# Patient Record
Sex: FEMALE | Race: WHITE | Employment: OTHER | URBAN - METROPOLITAN AREA
[De-identification: names, ages, dates, MRNs, and addresses within clinical notes are randomized per-mention and may not be internally consistent; named-entity substitution may affect disease eponyms.]

---

## 2017-11-28 ENCOUNTER — HOSPITAL ENCOUNTER (OUTPATIENT)
Dept: MAMMOGRAPHY | Age: 77
Discharge: HOME | End: 2017-11-28
Attending: FAMILY MEDICINE | Admitting: FAMILY MEDICINE

## 2017-11-28 DIAGNOSIS — Z12.31 ENCOUNTER FOR SCREENING MAMMOGRAM FOR BREAST CANCER: ICD-10-CM

## 2018-12-03 ENCOUNTER — HOSPITAL ENCOUNTER (OUTPATIENT)
Dept: GENERAL RADIOLOGY | Age: 78
Discharge: HOME | End: 2018-12-03
Payer: MEDICARE

## 2018-12-03 ENCOUNTER — HOSPITAL ENCOUNTER (OUTPATIENT)
Age: 78
Discharge: HOME | End: 2018-12-03
Payer: MEDICARE

## 2018-12-03 DIAGNOSIS — R05.9 COUGH: ICD-10-CM

## 2018-12-03 PROCEDURE — 71046 X-RAY EXAM CHEST 2 VIEWS: CPT

## 2019-01-07 ENCOUNTER — HOSPITAL ENCOUNTER (OUTPATIENT)
Dept: CT IMAGING | Age: 79
Discharge: HOME | End: 2019-01-07
Payer: MEDICARE

## 2019-01-07 DIAGNOSIS — R05.9 COUGH: ICD-10-CM

## 2019-01-07 PROCEDURE — 71260 CT THORAX DX C+: CPT

## 2019-01-07 PROCEDURE — 6360000004 HC RX CONTRAST MEDICATION: Performed by: FAMILY MEDICINE

## 2019-01-07 RX ADMIN — IOPAMIDOL 75 ML: 755 INJECTION, SOLUTION INTRAVENOUS at 13:07

## 2019-01-09 ENCOUNTER — TELEPHONE (OUTPATIENT)
Dept: PULMONOLOGY | Age: 79
End: 2019-01-09

## 2019-01-25 ENCOUNTER — HOSPITAL ENCOUNTER (OUTPATIENT)
Dept: MAMMOGRAPHY | Age: 79
Discharge: HOME | End: 2019-01-25
Payer: MEDICARE

## 2019-01-25 DIAGNOSIS — Z12.31 SCREENING MAMMOGRAM, ENCOUNTER FOR: ICD-10-CM

## 2019-01-25 PROCEDURE — 77067 SCR MAMMO BI INCL CAD: CPT

## 2019-03-14 ENCOUNTER — HOSPITAL ENCOUNTER (OUTPATIENT)
Age: 79
Discharge: HOME | End: 2019-03-14
Payer: MEDICARE

## 2019-03-14 PROCEDURE — 83013 H PYLORI (C-13) BREATH: CPT

## 2019-03-15 LAB — H PYLORI BREATH TEST: NEGATIVE

## 2019-09-30 ENCOUNTER — HOSPITAL ENCOUNTER (OUTPATIENT)
Dept: GENERAL RADIOLOGY | Age: 79
Discharge: HOME | End: 2019-09-30
Payer: MEDICARE

## 2019-09-30 ENCOUNTER — HOSPITAL ENCOUNTER (OUTPATIENT)
Age: 79
Discharge: HOME | End: 2019-09-30
Payer: MEDICARE

## 2019-09-30 DIAGNOSIS — M25.551 RIGHT HIP PAIN: ICD-10-CM

## 2019-09-30 PROCEDURE — 73502 X-RAY EXAM HIP UNI 2-3 VIEWS: CPT

## 2019-11-05 ENCOUNTER — OFFICE VISIT (OUTPATIENT)
Dept: ORTHOPEDIC SURGERY | Age: 79
End: 2019-11-05
Payer: MEDICARE

## 2019-11-05 VITALS — WEIGHT: 154.98 LBS | HEIGHT: 65 IN | BODY MASS INDEX: 25.82 KG/M2

## 2019-11-05 DIAGNOSIS — M16.11 PRIMARY OSTEOARTHRITIS OF RIGHT HIP: Primary | ICD-10-CM

## 2019-11-05 DIAGNOSIS — M54.32 BILATERAL SCIATICA: ICD-10-CM

## 2019-11-05 DIAGNOSIS — M48.061 SPINAL STENOSIS OF LUMBAR REGION, UNSPECIFIED WHETHER NEUROGENIC CLAUDICATION PRESENT: ICD-10-CM

## 2019-11-05 DIAGNOSIS — M70.61 GREATER TROCHANTERIC BURSITIS OF RIGHT HIP: ICD-10-CM

## 2019-11-05 DIAGNOSIS — M54.31 BILATERAL SCIATICA: ICD-10-CM

## 2019-11-05 PROCEDURE — G8400 PT W/DXA NO RESULTS DOC: HCPCS | Performed by: ORTHOPAEDIC SURGERY

## 2019-11-05 PROCEDURE — 1036F TOBACCO NON-USER: CPT | Performed by: ORTHOPAEDIC SURGERY

## 2019-11-05 PROCEDURE — G8417 CALC BMI ABV UP PARAM F/U: HCPCS | Performed by: ORTHOPAEDIC SURGERY

## 2019-11-05 PROCEDURE — G8428 CUR MEDS NOT DOCUMENT: HCPCS | Performed by: ORTHOPAEDIC SURGERY

## 2019-11-05 PROCEDURE — 1090F PRES/ABSN URINE INCON ASSESS: CPT | Performed by: ORTHOPAEDIC SURGERY

## 2019-11-05 PROCEDURE — G8484 FLU IMMUNIZE NO ADMIN: HCPCS | Performed by: ORTHOPAEDIC SURGERY

## 2019-11-05 PROCEDURE — 99203 OFFICE O/P NEW LOW 30 MIN: CPT | Performed by: ORTHOPAEDIC SURGERY

## 2019-11-05 PROCEDURE — 1123F ACP DISCUSS/DSCN MKR DOCD: CPT | Performed by: ORTHOPAEDIC SURGERY

## 2019-11-05 PROCEDURE — 4040F PNEUMOC VAC/ADMIN/RCVD: CPT | Performed by: ORTHOPAEDIC SURGERY

## 2019-11-05 RX ORDER — PREDNISONE 1 MG/1
TABLET ORAL
Qty: 55 TABLET | Refills: 0 | Status: SHIPPED | OUTPATIENT
Start: 2019-11-05 | End: 2020-07-01

## 2019-11-05 RX ORDER — TRAMADOL HYDROCHLORIDE 50 MG/1
50 TABLET ORAL EVERY 6 HOURS PRN
Qty: 28 TABLET | Refills: 0 | Status: SHIPPED | OUTPATIENT
Start: 2019-11-05 | End: 2019-11-12

## 2020-01-07 ENCOUNTER — OFFICE VISIT (OUTPATIENT)
Dept: ORTHOPEDIC SURGERY | Age: 80
End: 2020-01-07
Payer: MEDICARE

## 2020-01-07 PROCEDURE — 20610 DRAIN/INJ JOINT/BURSA W/O US: CPT | Performed by: ORTHOPAEDIC SURGERY

## 2020-01-07 RX ORDER — BETAMETHASONE SODIUM PHOSPHATE AND BETAMETHASONE ACETATE 3; 3 MG/ML; MG/ML
12 INJECTION, SUSPENSION INTRA-ARTICULAR; INTRALESIONAL; INTRAMUSCULAR; SOFT TISSUE ONCE
Status: COMPLETED | OUTPATIENT
Start: 2020-01-07 | End: 2020-01-07

## 2020-01-07 RX ADMIN — BETAMETHASONE SODIUM PHOSPHATE AND BETAMETHASONE ACETATE 12 MG: 3; 3 INJECTION, SUSPENSION INTRA-ARTICULAR; INTRALESIONAL; INTRAMUSCULAR; SOFT TISSUE at 09:45

## 2020-01-07 NOTE — PROGRESS NOTES
She presents today requesting a cortisone injection to the right hip for diagnosis of greater trochanteric bursitis    After informed consent was received from the patient, the right hip was sterilely prepped using Betadine with the patient in a lateral decubitusposition then using ethyl chloride as a topical refrigerant andan injection using 1 mL of 6mg/ml Bethamethasone, 2 mL each of 1% Xylocaine and 0.5%Marcaine in a 5 mL syringe and a 25-gauge spinal needle was used to inject the medication into the greater trochanteric region and to the point of maximum  tenderness. The patient otherwise appeared to tolerate the injection well. Encounter Diagnosis   Name Primary?     Greater trochanteric bursitis of right hip Yes      Orders Placed This Encounter   Procedures    MD ARTHROCENTESIS ASPIR&/INJ MAJOR JT/BURSA W/O US

## 2020-01-27 ENCOUNTER — HOSPITAL ENCOUNTER (OUTPATIENT)
Dept: MAMMOGRAPHY | Age: 80
Discharge: HOME | End: 2020-01-27
Payer: MEDICARE

## 2020-01-27 ENCOUNTER — TELEPHONE (OUTPATIENT)
Dept: MAMMOGRAPHY | Age: 80
End: 2020-01-27

## 2020-01-27 PROCEDURE — 77067 SCR MAMMO BI INCL CAD: CPT

## 2020-07-01 ENCOUNTER — APPOINTMENT (OUTPATIENT)
Dept: CT IMAGING | Age: 80
End: 2020-07-01
Payer: MEDICARE

## 2020-07-01 ENCOUNTER — APPOINTMENT (OUTPATIENT)
Dept: GENERAL RADIOLOGY | Age: 80
End: 2020-07-01
Payer: MEDICARE

## 2020-07-01 ENCOUNTER — HOSPITAL ENCOUNTER (EMERGENCY)
Age: 80
Discharge: ANOTHER HOSPITAL | End: 2020-07-01
Attending: EMERGENCY MEDICINE
Payer: MEDICARE

## 2020-07-01 ENCOUNTER — HOSPITAL ENCOUNTER (OUTPATIENT)
Age: 80
Setting detail: OBSERVATION
Discharge: HOME | End: 2020-07-02
Attending: INTERNAL MEDICINE | Admitting: INTERNAL MEDICINE
Payer: MEDICARE

## 2020-07-01 VITALS
RESPIRATION RATE: 16 BRPM | DIASTOLIC BLOOD PRESSURE: 67 MMHG | SYSTOLIC BLOOD PRESSURE: 153 MMHG | WEIGHT: 146 LBS | TEMPERATURE: 98.6 F | HEART RATE: 43 BPM | BODY MASS INDEX: 24.32 KG/M2 | OXYGEN SATURATION: 97 % | HEIGHT: 65 IN

## 2020-07-01 PROBLEM — R07.9 CHEST PAIN: Status: ACTIVE | Noted: 2020-07-01

## 2020-07-01 LAB
A/G RATIO: 2 (ref 1.1–2.2)
ALBUMIN SERPL-MCNC: 4.4 G/DL (ref 3.4–5)
ALP BLD-CCNC: 66 U/L (ref 40–129)
ALT SERPL-CCNC: 10 U/L (ref 10–40)
ANION GAP SERPL CALCULATED.3IONS-SCNC: 8 MMOL/L (ref 3–16)
APTT: 39.9 SEC (ref 24.2–36.2)
AST SERPL-CCNC: 20 U/L (ref 15–37)
BASOPHILS ABSOLUTE: 0.1 K/UL (ref 0–0.2)
BASOPHILS RELATIVE PERCENT: 0.8 %
BILIRUB SERPL-MCNC: 0.9 MG/DL (ref 0–1)
BUN BLDV-MCNC: 22 MG/DL (ref 7–20)
CALCIUM SERPL-MCNC: 9.4 MG/DL (ref 8.3–10.6)
CHLORIDE BLD-SCNC: 106 MMOL/L (ref 99–110)
CO2: 28 MMOL/L (ref 21–32)
CREAT SERPL-MCNC: 0.7 MG/DL (ref 0.6–1.2)
EOSINOPHILS ABSOLUTE: 0.1 K/UL (ref 0–0.6)
EOSINOPHILS RELATIVE PERCENT: 2.4 %
GFR AFRICAN AMERICAN: >60
GFR NON-AFRICAN AMERICAN: >60
GLOBULIN: 2.2 G/DL
GLUCOSE BLD-MCNC: 98 MG/DL (ref 70–99)
HCT VFR BLD CALC: 43 % (ref 36–48)
HEMOGLOBIN: 14.1 G/DL (ref 12–16)
INR BLD: 1.84 (ref 0.86–1.14)
LIPASE: 18 U/L (ref 13–60)
LYMPHOCYTES ABSOLUTE: 1.5 K/UL (ref 1–5.1)
LYMPHOCYTES RELATIVE PERCENT: 23.9 %
MCH RBC QN AUTO: 29.3 PG (ref 26–34)
MCHC RBC AUTO-ENTMCNC: 32.8 G/DL (ref 31–36)
MCV RBC AUTO: 89.3 FL (ref 80–100)
MONOCYTES ABSOLUTE: 0.5 K/UL (ref 0–1.3)
MONOCYTES RELATIVE PERCENT: 8.5 %
NEUTROPHILS ABSOLUTE: 4 K/UL (ref 1.7–7.7)
NEUTROPHILS RELATIVE PERCENT: 64.4 %
PDW BLD-RTO: 14.6 % (ref 12.4–15.4)
PLATELET # BLD: 130 K/UL (ref 135–450)
PMV BLD AUTO: 9.5 FL (ref 5–10.5)
POTASSIUM SERPL-SCNC: 4 MMOL/L (ref 3.5–5.1)
PROTHROMBIN TIME: 21 SEC (ref 10–13.2)
RBC # BLD: 4.81 M/UL (ref 4–5.2)
SODIUM BLD-SCNC: 142 MMOL/L (ref 136–145)
TOTAL PROTEIN: 6.6 G/DL (ref 6.4–8.2)
TROPONIN: <0.01 NG/ML
WBC # BLD: 6.1 K/UL (ref 4–11)

## 2020-07-01 PROCEDURE — 6360000004 HC RX CONTRAST MEDICATION: Performed by: EMERGENCY MEDICINE

## 2020-07-01 PROCEDURE — 85610 PROTHROMBIN TIME: CPT

## 2020-07-01 PROCEDURE — 71045 X-RAY EXAM CHEST 1 VIEW: CPT

## 2020-07-01 PROCEDURE — 84484 ASSAY OF TROPONIN QUANT: CPT

## 2020-07-01 PROCEDURE — 71260 CT THORAX DX C+: CPT

## 2020-07-01 PROCEDURE — 85730 THROMBOPLASTIN TIME PARTIAL: CPT

## 2020-07-01 PROCEDURE — 36415 COLL VENOUS BLD VENIPUNCTURE: CPT

## 2020-07-01 PROCEDURE — 83690 ASSAY OF LIPASE: CPT

## 2020-07-01 PROCEDURE — 99285 EMERGENCY DEPT VISIT HI MDM: CPT

## 2020-07-01 PROCEDURE — 93005 ELECTROCARDIOGRAM TRACING: CPT | Performed by: EMERGENCY MEDICINE

## 2020-07-01 PROCEDURE — 80053 COMPREHEN METABOLIC PANEL: CPT

## 2020-07-01 PROCEDURE — 85025 COMPLETE CBC W/AUTO DIFF WBC: CPT

## 2020-07-01 RX ORDER — OMEPRAZOLE 20 MG/1
20 CAPSULE, DELAYED RELEASE ORAL DAILY
COMMUNITY

## 2020-07-01 RX ORDER — UMECLIDINIUM BROMIDE AND VILANTEROL TRIFENATATE 62.5; 25 UG/1; UG/1
1 POWDER RESPIRATORY (INHALATION) DAILY
COMMUNITY

## 2020-07-01 RX ORDER — POTASSIUM CHLORIDE 750 MG/1
10 TABLET, EXTENDED RELEASE ORAL 2 TIMES DAILY
COMMUNITY

## 2020-07-01 RX ORDER — SOTALOL HYDROCHLORIDE 80 MG/1
80 TABLET ORAL 2 TIMES DAILY
Status: ON HOLD | COMMUNITY
End: 2021-06-09

## 2020-07-01 RX ORDER — FLUTICASONE FUROATE 100 UG/1
POWDER RESPIRATORY (INHALATION) DAILY
COMMUNITY

## 2020-07-01 RX ADMIN — IOPAMIDOL 75 ML: 755 INJECTION, SOLUTION INTRAVENOUS at 18:00

## 2020-07-01 ASSESSMENT — ENCOUNTER SYMPTOMS
NAUSEA: 0
ABDOMINAL PAIN: 0
COUGH: 0
VOMITING: 0
DIARRHEA: 1
SORE THROAT: 0

## 2020-07-01 NOTE — ED NOTES
Bed: 06  Expected date:   Expected time:   Means of arrival:   Comments:  -[facility]     Surendra Guallpa RN  07/01/20 8041

## 2020-07-01 NOTE — ED NOTES
MOISESA hospitalist returned page via [facility] at [facility]  07/01/20 1937

## 2020-07-01 NOTE — ED NOTES
Pt to ER via EMS with reported sudden onset of sharp BLQ abd pain while attempting to have a BM PTA. Upon arrival to ER, pt A & O x 3, denies c/o's @ [facility], angel [facility].  Cardiac monitor applied     [address]  07/01/20 3354

## 2020-07-01 NOTE — ED NOTES
Pt resting with OU closed, resps even and unlab. Daughter @ bedside.  Awaiting bed placement @ [facility] [address] [place] [address]  07/01/20 1955

## 2020-07-01 NOTE — ED PROVIDER NOTES
[facility] [facility]      Pt Name: Keven Munoz  MRN: [id number]  [place] 1940  Date of evaluation: 7/1/2020  Provider:  Piero Ken MD                  HISTORY OF PRESENT ILLNESS   (Location/Symptom, Timing/Onset, Context/Setting, Quality, Duration, Modifying Factors, Severity)  Note limiting factors. Patient presents emerged department complaining of severe abdominal pain that lasted approximate hour. Patient states she started 3:00 she called the squad and was brought here and she states when she got here was nearly completely resolved and is completely gone at this time. States she also had chest pain. Patient states she has a history of atrial fibrillation but she goes in and out of a normal rhythm. She states she usually does not get any chest pain. Her problems in the past that brought her to the cardiologist were primarily dyspnea. Patient has she states daily chills that are unchanged. She has not had any fevers no earaches no sore throat no cough no change in her breathing. She states that she has had diarrhea for the past several days. And she has diarrhea twice a day. She has not had any vomiting or nausea associated with it. He has no chest pain or abdominal pain at this time. Nursing Notes were reviewed. REVIEW OF SYSTEMS    (2-9 systems for level 4, 10 or more for level 5)     Review of Systems   Constitutional: Positive for chills. Negative for fever. Chronic chills unchanged   HENT: Negative for ear pain and sore throat. Respiratory: Negative for cough. Chronic sob no change   Cardiovascular: Positive for chest pain. Gastrointestinal: Positive for diarrhea. Negative for abdominal pain, nausea and vomiting. Genitourinary: Negative for dysuria. Skin: Negative for rash. Neurological: Negative for headaches. Psychiatric/Behavioral: Negative for confusion.              PAST MEDICAL HISTORY   has a past medical history of Atrial fibrillation ([address] Depression (disease), Hypertension, Restless legs syndrome (RLS), Thyroid disease, and Urinary bladder disorder. PAST SURGICAL HISTORY   has a past surgical history that includes Appendectomy; Hysterectomy; and cyst removal.    FAMILY HISTORY  family history is not on file. SOCIAL HISTORY   reports that she quit smoking about 19 years ago. Her smoking use included cigarettes. She has never used smokeless tobacco. She reports that she does not drink alcohol or use drugs. HOME MEDICATIONS     Prior to Admission medications    Medication Sig Start Date End Date Taking? Authorizing Provider   rivaroxaban (XARELTO) 10 MG TABS tablet Take 10 mg by mouth. Yes Historical Provider, MD   flecainide (TAMBOCOR) 100 MG tablet Take 100 mg by mouth 2 times daily. Yes Historical Provider, MD   citalopram (CELEXA) 20 MG tablet Take 10 mg by mouth daily. Yes Historical Provider, MD   hydrochlorothiazide (HYDRODIURIL) 25 MG tablet Take 1 tablet by mouth daily 2/23/17   Riley Nelson MD   melatonin 3 MG TABS tablet Take 3 mg by mouth daily    Historical Provider, MD   Levothyroxine Sodium 50 MCG CAPS Take 50 mcg by mouth daily. 11/13/10   Historical Provider, MD   rosuvastatin (CRESTOR) 10 MG tablet Take 10 mg by mouth nightly. 11/13/10   Historical Provider, MD   solifenacin (VESICARE) 10 MG tablet Take 5 mg by mouth nightly. 11/13/10   Historical Provider, MD        ALLERGIES  has No Known Allergies. PHYSICAL EXAM    (up to 7 for level 4, 8 or more for level 5)         ED TRIAGE VITALS      Pulse (!) 48   Temp 98.6 °F (37 °C) (Oral)   Resp 20   Ht 5' 5\" (1.651 m)   Wt 146 lb (66.2 kg)   SpO2 98%   BMI 24.30 kg/m²         Physical Exam  Vitals signs and nursing note reviewed. Constitutional:       General: She is not in acute distress. Appearance: She is well-developed. She is not ill-appearing, toxic-appearing or diaphoretic. HENT:      Head: Normocephalic and atraumatic. Right Ear: Tympanic membrane normal. There is no impacted cerumen. Left Ear: Tympanic membrane normal. There is no impacted cerumen. Nose: Nose normal. No congestion or rhinorrhea. Mouth/Throat:      Mouth: Mucous membranes are moist.      Pharynx: Oropharynx is clear. No oropharyngeal exudate or posterior oropharyngeal erythema. Eyes:      Conjunctiva/sclera: Conjunctivae normal.      Pupils: Pupils are equal, round, and reactive to light. Neck:      Musculoskeletal: Normal range of motion and neck supple. No neck rigidity. Cardiovascular:      Rate and Rhythm: Normal rate and regular rhythm. Pulses: Normal pulses. Heart sounds: Normal heart sounds. No murmur. No friction rub. Pulmonary:      Effort: Pulmonary effort is normal. No respiratory distress. Breath sounds: Normal breath sounds. No stridor. No wheezing, rhonchi or rales. Abdominal:      General: Bowel sounds are normal.      Palpations: Abdomen is soft. Tenderness: There is no abdominal tenderness. There is no guarding or rebound. Musculoskeletal: Normal range of motion. General: No tenderness. Right lower leg: No edema. Left lower leg: No edema. Skin:     General: Skin is warm and dry. Capillary Refill: Capillary refill takes less than 2 seconds. Findings: No rash. Neurological:      General: No focal deficit present. Mental Status: She is alert and oriented to person, place, and time. Psychiatric:         Mood and Affect: Mood normal.         Behavior: Behavior normal.         DIAGNOSTIC RESULTS         RADIOLOGY:     CT CHEST ABDOMEN PELVIS W CONTRAST   Final Result   No acute intrathoracic nor abdominopelvic abnormality. Distal colonic diverticulosis without radiographic evidence of active   inflammation. XR CHEST PORTABLE   Final Result   No acute process.                  LABS:  Results for orders placed or performed during the hospital encounter of 07/01/20   CBC Auto Differential   Result Value Ref Range    WBC 6.1 4.0 - 11.0 K/uL    RBC 4.81 4.00 - 5.20 M/uL    Hemoglobin 14.1 12.0 - 16.0 g/dL    Hematocrit 43.0 36.0 - 48.0 %    MCV 89.3 80.0 - 100.0 fL    MCH 29.3 26.0 - 34.0 pg    MCHC 32.8 31.0 - 36.0 g/dL    RDW 14.6 12.4 - 15.4 %    Platelets 945 (L) 919 - 450 K/uL    MPV 9.5 5.0 - 10.5 fL    Neutrophils % 64.4 %    Lymphocytes % 23.9 %    Monocytes % 8.5 %    Eosinophils % 2.4 %    Basophils % 0.8 %    Neutrophils Absolute 4.0 1.7 - 7.7 K/uL    Lymphocytes Absolute 1.5 1.0 - 5.1 K/uL    Monocytes Absolute 0.5 0.0 - 1.3 K/uL    Eosinophils Absolute 0.1 0.0 - 0.6 K/uL    Basophils Absolute 0.1 0.0 - 0.2 K/uL   Comprehensive Metabolic Panel   Result Value Ref Range    Sodium 142 136 - 145 mmol/L    Potassium 4.0 3.5 - 5.1 mmol/L    Chloride 106 99 - 110 mmol/L    CO2 28 21 - 32 mmol/L    Anion Gap 8 3 - 16    Glucose 98 70 - 99 mg/dL    BUN 22 (H) 7 - 20 mg/dL    CREATININE 0.7 0.6 - 1.2 mg/dL    GFR Non-African American >60 >60    GFR African American >60 >60    Calcium 9.4 8.3 - 10.6 mg/dL    Total Protein 6.6 6.4 - 8.2 g/dL    Alb 4.4 3.4 - 5.0 g/dL    Albumin/Globulin Ratio 2.0 1.1 - 2.2    Total Bilirubin 0.9 0.0 - 1.0 mg/dL    Alkaline Phosphatase 66 40 - 129 U/L    ALT 10 10 - 40 U/L    AST 20 15 - 37 U/L    Globulin 2.2 g/dL   Troponin   Result Value Ref Range    Troponin <0.01 <0.01 ng/mL   Lipase   Result Value Ref Range    Lipase 18.0 13.0 - 60.0 U/L   Protime-INR   Result Value Ref Range    Protime 21.0 (H) 10.0 - 13.2 sec    INR 1.84 (H) 0.86 - 1.14   APTT   Result Value Ref Range    aPTT 39.9 (H) 24.2 - 36.2 sec   EKG 12 Lead   Result Value Ref Range    Ventricular Rate 52 BPM    Atrial Rate 52 BPM    P-R Interval 216 ms    QRS Duration 100 ms    Q-T Interval 486 ms    QTc Calculation (Bazett) 451 ms    P Axis 58 degrees    R Axis -28 degrees    T Axis 48 degrees    Diagnosis       Sinus bradycardia with 1st degree A-V blockAnteroseptal infarct (cited on or before 02-JAN-2006)Abnormal ECGWhen compared with ECG of 28-DEC-2016 10:56,No significant change was found                 EKG interpreted by   EKG interpreted by Alice Hoffman MD:    Rhythm: sinus bradycardia  Rate: 52  Ectopy: none  Conduction: 1st degree AV block  ST Segments: no acute change  T Waves: no acute change  Poor R wave progression. PROCEDURES:  Procedures        Emergency Department Course:  Patient had multiple days of diarrhea and had abdominal and chest pain today. I discussed with her work-up including labs and imaging and she is agreeable to this. Patient appears on the monitor to be in a sinus rhythm with heart rate as low as 41 up to the high 40s. She is asymptomatic at this time for it. She states that they have recently changed her medications but she does not know what medicines were changed. She has never had any problems with contrast.  She is agreeable to the plan.    7:04 PM EDT  Patient gives permission for family/companions to be present during questioning, answers and results during their emergency room visit. With the patient and her family and we discussed her results. Again the concern today was the chest pain and the subsequent bradycardia. Patient is on medications for atrial fibrillation today this time she is in a sinus rhythm. Her heart rate is been anywhere from 41 at the lowest to the highest 1 time 61 but for the most part is in the 40s. She has not been hypotensive with this. She did have an hour of chest discomfort today and normally does not have chest discomfort. I discussed with her being admitted to the hospital for further evaluation and she is agreeable to this. She would like to be admitted to [facility]. Spoke with Dr. Estbean Paredes and discussed symptoms, exam, objective data and clinical course. Disposition and plan agreed upon.  He will admit the patient and give/enter admitting orders. I spoke with the patient and her daughter again and they remain agreeable to the plan. Patient is asking if she can drink she has no discomfort at this time. She will be Eddie when a bed is become available. Did let Dr. Erik Pepe know about the patient as he is the on-duty physician now in case the patient needs any while she waits for bed assignment and transfer. The Clinical Impression is history of chest pain, bradycardia  History of abdominal pain and diarrhea. No flowsheet data found. (Please note that portions of this note were completed with a voice recognition program.  Efforts were made to edit the dictations but occasionally words are mis-transcribed.)    Thompson Adler MD (electronically signed)  Attending Emergency Physician      This document serves as a record of the services and decisions personally performed by myself, Raimundo Lovelace MD.  This dictation was generated by voice recognition computer software. Although all attempts are made to edit the dictation for accuracy, there may be errors in the transcription that are not intended.             Raimundo Lovelace MD  07/01/20 3200       Raimundo Lovelace MD  07/01/20 6923

## 2020-07-01 NOTE — ED NOTES
MOISESA hospitalist returned page via [facility] at [address] [facility]  07/01/20 5301

## 2020-07-02 ENCOUNTER — APPOINTMENT (OUTPATIENT)
Dept: NUCLEAR MEDICINE | Age: 80
End: 2020-07-02
Attending: INTERNAL MEDICINE
Payer: MEDICARE

## 2020-07-02 VITALS
WEIGHT: 147 LBS | OXYGEN SATURATION: 97 % | HEART RATE: 49 BPM | HEIGHT: 64 IN | BODY MASS INDEX: 25.1 KG/M2 | RESPIRATION RATE: 16 BRPM | SYSTOLIC BLOOD PRESSURE: 140 MMHG | DIASTOLIC BLOOD PRESSURE: 73 MMHG | TEMPERATURE: 97.9 F

## 2020-07-02 PROBLEM — R79.89 ELEVATED TROPONIN: Status: ACTIVE | Noted: 2020-07-02

## 2020-07-02 PROBLEM — R77.8 ELEVATED TROPONIN: Status: ACTIVE | Noted: 2020-07-02

## 2020-07-02 LAB
ANION GAP SERPL CALCULATED.3IONS-SCNC: 8 MMOL/L (ref 3–16)
BUN BLDV-MCNC: 17 MG/DL (ref 7–20)
CALCIUM SERPL-MCNC: 9.3 MG/DL (ref 8.3–10.6)
CHLORIDE BLD-SCNC: 102 MMOL/L (ref 99–110)
CHOLESTEROL, TOTAL: 139 MG/DL (ref 0–199)
CO2: 29 MMOL/L (ref 21–32)
CREAT SERPL-MCNC: 0.7 MG/DL (ref 0.6–1.2)
EKG ATRIAL RATE: 52 BPM
EKG DIAGNOSIS: NORMAL
EKG P AXIS: 58 DEGREES
EKG P-R INTERVAL: 216 MS
EKG Q-T INTERVAL: 486 MS
EKG QRS DURATION: 100 MS
EKG QTC CALCULATION (BAZETT): 451 MS
EKG R AXIS: -28 DEGREES
EKG T AXIS: 48 DEGREES
EKG VENTRICULAR RATE: 52 BPM
GFR AFRICAN AMERICAN: >60
GFR NON-AFRICAN AMERICAN: >60
GLUCOSE BLD-MCNC: 95 MG/DL (ref 70–99)
HCT VFR BLD CALC: 42 % (ref 36–48)
HDLC SERPL-MCNC: 56 MG/DL (ref 40–60)
HEMOGLOBIN: 13.9 G/DL (ref 12–16)
LDL CHOLESTEROL CALCULATED: 70 MG/DL
LV EF: 64 %
LVEF MODALITY: NORMAL
MCH RBC QN AUTO: 29.3 PG (ref 26–34)
MCHC RBC AUTO-ENTMCNC: 33.2 G/DL (ref 31–36)
MCV RBC AUTO: 88.4 FL (ref 80–100)
PDW BLD-RTO: 13.8 % (ref 12.4–15.4)
PLATELET # BLD: 118 K/UL (ref 135–450)
PMV BLD AUTO: 8.7 FL (ref 5–10.5)
POTASSIUM REFLEX MAGNESIUM: 3.6 MMOL/L (ref 3.5–5.1)
RBC # BLD: 4.75 M/UL (ref 4–5.2)
SODIUM BLD-SCNC: 139 MMOL/L (ref 136–145)
TRIGL SERPL-MCNC: 65 MG/DL (ref 0–150)
TROPONIN: 0.03 NG/ML
TROPONIN: 0.03 NG/ML
VLDLC SERPL CALC-MCNC: 13 MG/DL
WBC # BLD: 7.2 K/UL (ref 4–11)

## 2020-07-02 PROCEDURE — 80048 BASIC METABOLIC PNL TOTAL CA: CPT

## 2020-07-02 PROCEDURE — 2580000003 HC RX 258: Performed by: NURSE PRACTITIONER

## 2020-07-02 PROCEDURE — 6360000002 HC RX W HCPCS: Performed by: INTERNAL MEDICINE

## 2020-07-02 PROCEDURE — 78452 HT MUSCLE IMAGE SPECT MULT: CPT

## 2020-07-02 PROCEDURE — 94640 AIRWAY INHALATION TREATMENT: CPT

## 2020-07-02 PROCEDURE — G0378 HOSPITAL OBSERVATION PER HR: HCPCS

## 2020-07-02 PROCEDURE — 6370000000 HC RX 637 (ALT 250 FOR IP): Performed by: NURSE PRACTITIONER

## 2020-07-02 PROCEDURE — 93017 CV STRESS TEST TRACING ONLY: CPT

## 2020-07-02 PROCEDURE — 36415 COLL VENOUS BLD VENIPUNCTURE: CPT

## 2020-07-02 PROCEDURE — 93010 ELECTROCARDIOGRAM REPORT: CPT | Performed by: INTERNAL MEDICINE

## 2020-07-02 PROCEDURE — A9502 TC99M TETROFOSMIN: HCPCS | Performed by: INTERNAL MEDICINE

## 2020-07-02 PROCEDURE — 99204 OFFICE O/P NEW MOD 45 MIN: CPT | Performed by: INTERNAL MEDICINE

## 2020-07-02 PROCEDURE — 84484 ASSAY OF TROPONIN QUANT: CPT

## 2020-07-02 PROCEDURE — 3430000000 HC RX DIAGNOSTIC RADIOPHARMACEUTICAL: Performed by: INTERNAL MEDICINE

## 2020-07-02 PROCEDURE — 80061 LIPID PANEL: CPT

## 2020-07-02 PROCEDURE — 85027 COMPLETE CBC AUTOMATED: CPT

## 2020-07-02 RX ORDER — ASPIRIN 81 MG/1
81 TABLET, CHEWABLE ORAL DAILY
Status: DISCONTINUED | OUTPATIENT
Start: 2020-07-03 | End: 2020-07-02 | Stop reason: HOSPADM

## 2020-07-02 RX ORDER — FLUTICASONE PROPIONATE 110 UG/1
1 AEROSOL, METERED RESPIRATORY (INHALATION) 2 TIMES DAILY
Status: DISCONTINUED | OUTPATIENT
Start: 2020-07-02 | End: 2020-07-02 | Stop reason: HOSPADM

## 2020-07-02 RX ORDER — ACETAMINOPHEN 325 MG/1
650 TABLET ORAL EVERY 6 HOURS PRN
Status: DISCONTINUED | OUTPATIENT
Start: 2020-07-02 | End: 2020-07-02 | Stop reason: HOSPADM

## 2020-07-02 RX ORDER — HYDROCHLOROTHIAZIDE 25 MG/1
25 TABLET ORAL DAILY
Status: DISCONTINUED | OUTPATIENT
Start: 2020-07-02 | End: 2020-07-02 | Stop reason: HOSPADM

## 2020-07-02 RX ORDER — POTASSIUM CHLORIDE 750 MG/1
10 TABLET, EXTENDED RELEASE ORAL 2 TIMES DAILY
Status: DISCONTINUED | OUTPATIENT
Start: 2020-07-02 | End: 2020-07-02 | Stop reason: HOSPADM

## 2020-07-02 RX ORDER — SODIUM CHLORIDE 0.9 % (FLUSH) 0.9 %
10 SYRINGE (ML) INJECTION PRN
Status: DISCONTINUED | OUTPATIENT
Start: 2020-07-02 | End: 2020-07-02 | Stop reason: HOSPADM

## 2020-07-02 RX ORDER — LEVOTHYROXINE SODIUM 0.05 MG/1
50 TABLET ORAL DAILY
Status: DISCONTINUED | OUTPATIENT
Start: 2020-07-02 | End: 2020-07-02 | Stop reason: HOSPADM

## 2020-07-02 RX ORDER — ROSUVASTATIN CALCIUM 10 MG/1
10 TABLET, COATED ORAL NIGHTLY
Status: DISCONTINUED | OUTPATIENT
Start: 2020-07-02 | End: 2020-07-02 | Stop reason: HOSPADM

## 2020-07-02 RX ORDER — ACETAMINOPHEN 650 MG/1
650 SUPPOSITORY RECTAL EVERY 6 HOURS PRN
Status: DISCONTINUED | OUTPATIENT
Start: 2020-07-02 | End: 2020-07-02 | Stop reason: HOSPADM

## 2020-07-02 RX ORDER — PROMETHAZINE HYDROCHLORIDE 25 MG/1
12.5 TABLET ORAL EVERY 6 HOURS PRN
Status: DISCONTINUED | OUTPATIENT
Start: 2020-07-02 | End: 2020-07-02 | Stop reason: HOSPADM

## 2020-07-02 RX ORDER — PANTOPRAZOLE SODIUM 40 MG/1
40 TABLET, DELAYED RELEASE ORAL
Status: DISCONTINUED | OUTPATIENT
Start: 2020-07-02 | End: 2020-07-02 | Stop reason: HOSPADM

## 2020-07-02 RX ORDER — ONDANSETRON 2 MG/ML
4 INJECTION INTRAMUSCULAR; INTRAVENOUS EVERY 6 HOURS PRN
Status: DISCONTINUED | OUTPATIENT
Start: 2020-07-02 | End: 2020-07-02 | Stop reason: HOSPADM

## 2020-07-02 RX ORDER — SODIUM CHLORIDE 0.9 % (FLUSH) 0.9 %
10 SYRINGE (ML) INJECTION EVERY 12 HOURS SCHEDULED
Status: DISCONTINUED | OUTPATIENT
Start: 2020-07-02 | End: 2020-07-02 | Stop reason: HOSPADM

## 2020-07-02 RX ORDER — CITALOPRAM 20 MG/1
10 TABLET ORAL DAILY
Status: DISCONTINUED | OUTPATIENT
Start: 2020-07-02 | End: 2020-07-02 | Stop reason: HOSPADM

## 2020-07-02 RX ADMIN — RIVAROXABAN 20 MG: 20 TABLET, FILM COATED ORAL at 15:27

## 2020-07-02 RX ADMIN — POTASSIUM CHLORIDE 10 MEQ: 750 TABLET, EXTENDED RELEASE ORAL at 00:50

## 2020-07-02 RX ADMIN — CITALOPRAM HYDROBROMIDE 10 MG: 20 TABLET ORAL at 15:27

## 2020-07-02 RX ADMIN — GLYCOPYRROLATE AND FORMOTEROL FUMARATE 2 PUFF: 9; 4.8 AEROSOL, METERED RESPIRATORY (INHALATION) at 08:18

## 2020-07-02 RX ADMIN — POTASSIUM CHLORIDE 10 MEQ: 750 TABLET, EXTENDED RELEASE ORAL at 15:27

## 2020-07-02 RX ADMIN — Medication 10 ML: at 09:00

## 2020-07-02 RX ADMIN — Medication 1 PUFF: at 08:19

## 2020-07-02 RX ADMIN — TETROFOSMIN 31 MILLICURIE: 1.38 INJECTION, POWDER, LYOPHILIZED, FOR SOLUTION INTRAVENOUS at 13:51

## 2020-07-02 RX ADMIN — REGADENOSON 0.4 MG: 0.08 INJECTION, SOLUTION INTRAVENOUS at 13:51

## 2020-07-02 RX ADMIN — TETROFOSMIN 10.4 MILLICURIE: 1.38 INJECTION, POWDER, LYOPHILIZED, FOR SOLUTION INTRAVENOUS at 12:26

## 2020-07-02 ASSESSMENT — PAIN SCALES - GENERAL
PAINLEVEL_OUTOF10: 0

## 2020-07-02 NOTE — PROGRESS NOTES
Left Dr Palumbo Said message at office to see if okay for DC. Stress is negative.  Can we DC as Dr Vimal Prieto is okay with Shira Lal RN

## 2020-07-02 NOTE — CONSULTS
[facility]   Cardiovascular Evaluation    PATIENT: Nadia Mckenna  DATE: 2020  MRN: [id number]  CSN: [id number]  : 1940    Primary Care Doctor/Referring provider: Joseph Barajas MD    Reason for evaluation/Chief complaint:   Chest Pain and elevated troponin    Subjective:    History of present illness on initial date of evaluation:   Nadia Mckenna is a 78 y.o. patient who presents for evaluation of complaints including abdominal pain and chest discomfort. Patient has a history of chronic paroxysmal atrial fibrillation and is on anticoagulation therapy. She normally follows with [facility] cardiology. She states that yesterday she started to experience abdominal discomfort and cramping which led her to come to the hospital.  States that she also did have some lower chest discomfort in the anterior portion of the chest.  She states that she has not had these kinds of symptoms before. She denied any other associated complaints. The pain did not radiate into the neck or arm area. She was admitted to the hospital.  Her initial troponin level is negative. Her subsequent troponin levels have been marginally elevated but flat. We have been asked to see the patient for further recommendations and management. This morning she denies any chief complaints and feels like she is back to normal.      Patient Active Problem List   Diagnosis    Spinal stenosis of lumbar region    Bilateral sciatica    Primary osteoarthritis of right hip    Greater trochanteric bursitis of right hip    Chest pain    Atrial fibrillation (HCC)    Hypertension    Restless legs syndrome (RLS)    Thyroid disease    Elevated troponin         Cardiac Testing: I have reviewed the findings below.   EKG:  ECHO:   STRESS TEST:  CATH:  BYPASS:  VASCULAR:    Past Medical History:   has a past medical history of Atrial fibrillation ([address] Depression (disease), Hypertension, Restless legs syndrome (RLS), Thyroid disease, and Urinary bladder disorder. Surgical History:   has a past surgical history that includes Appendectomy; Hysterectomy; and cyst removal.     Social History:   reports that she quit smoking about 19 years ago. Her smoking use included cigarettes. She has never used smokeless tobacco. She reports that she does not drink alcohol or use drugs. Family History:  No evidence for sudden cardiac death or premature CAD    Medications:  Reviewed and are listed in nursing record. and/or listed below  Outpatient Medications:  Prior to Admission medications    Medication Sig Start Date End Date Taking? Authorizing Provider   umeclidinium-vilanterol (ANORO ELLIPTA) 62.5-25 MCG/INH AEPB inhaler Inhale 1 puff into the lungs daily   Yes Historical Provider, MD   omeprazole (PRILOSEC) 20 MG delayed release capsule Take 20 mg by mouth daily   Yes Historical Provider, MD   fluticasone (ARNUITY ELLIPTA) 100 MCG/ACT AEPB Inhale into the lungs daily   Yes Historical Provider, MD   sotalol (BETAPACE) 80 MG tablet Take 80 mg by mouth 2 times daily   Yes Historical Provider, MD   potassium chloride (KLOR-CON M) 10 MEQ extended release tablet Take 10 mEq by mouth 2 times daily   Yes Historical Provider, MD   hydrochlorothiazide (HYDRODIURIL) 25 MG tablet Take 1 tablet by mouth daily 2/23/17  Yes Brook Reddy MD   melatonin 3 MG TABS tablet Take 3 mg by mouth daily   Yes Historical Provider, MD   rivaroxaban (XARELTO) 10 MG TABS tablet Take 20 mg by mouth    Yes Historical Provider, MD   Levothyroxine Sodium 50 MCG CAPS Take 50 mcg by mouth daily. 11/13/10  Yes Historical Provider, MD   rosuvastatin (CRESTOR) 10 MG tablet Take 10 mg by mouth nightly. 11/13/10  Yes Historical Provider, MD   citalopram (CELEXA) 20 MG tablet Take 10 mg by mouth daily.    Yes Historical Provider, MD       In-patient schedule medications:   citalopram  10 mg Oral Daily    fluticasone  1 puff Inhalation BID    hydroCHLOROthiazide  25 Intake/Output Summary (Last 24 hours) at 7/2/2020 0754  Last data filed at 7/2/2020 0514  Gross per 24 hour   Intake 0 ml   Output 200 ml   Net -200 ml       General Appearance:  Alert, cooperative, no distress, appears stated age   Head:  Normocephalic, without obvious abnormality, atraumatic   Eyes:  PERRL, conjunctiva/corneas clear       Nose: Nares normal, no drainage or sinus tenderness   Throat: Lips, mucosa, and tongue normal   Neck: Supple, symmetrical, trachea midline, no adenopathy, thyroid: not enlarged, symmetric, no tenderness/mass/nodules, no carotid bruit or JVD       Lungs:   Clear to auscultation bilaterally, respirations unlabored   Chest Wall:  No tenderness or deformity   Heart:  Regular rhythm and slow rate; S1, S2 are normal; no murmur noted; no rub or gallop   Abdomen:   Soft, non-tender, bowel sounds active all four quadrants,  no masses, no organomegaly           Extremities: Extremities normal, atraumatic, no cyanosis or edema   Pulses: 2+ and symmetric   Skin: Skin color, texture, turgor normal, no rashes or lesions   Pysch: Normal mood and affect   Neurologic: Normal gross motor and sensory exam.         Labs  Recent Labs     07/01/20  1630 07/02/20  0321   WBC 6.1 7.2   HGB 14.1 13.9   HCT 43.0 42.0   MCV 89.3 88.4   * 118*     Recent Labs     07/01/20  1630 07/02/20  0321   CREATININE 0.7 0.7   BUN 22* 17    139   K 4.0 3.6    102   CO2 28 29     Recent Labs     07/01/20  1630   INR 1.84*   PROTIME 21.0*     Recent Labs     07/01/20  1630 07/02/20  0101 07/02/20  0320   TROPONINI <0.01 0.03* 0.03*     Invalid input(s): PRO-BNP  No results for input(s): CHOL, HDL in the last 72 hours. Invalid input(s): LDL, TG      Imaging:  I have reviewed the below testing personally and my interpretation is below.   EKG:  Sinus bradycardia with 1st degree A-V blockAnteroseptal infarct (cited on or before 02-JAN-2006)Abnormal ECGWhen compared with ECG of 28-DEC-2016 10:56,No

## 2020-07-02 NOTE — DISCHARGE INSTR - DIET

## 2020-07-02 NOTE — PROGRESS NOTES
Pt has DC orders. [facility] notified of DC. Tele box removed and left at nurses station bin. PIV removed from RAC, catheter in tact, dry dressing placed, pt tolerated well. Reviewed all DC instructions and when to follow up with MD.  Daughter at side, will be taking patient home once  gets here, advised to call when ready to leave. Pt eating dinner.   Abner Nunez

## 2020-07-02 NOTE — PROGRESS NOTES
Patient admitted to room 201-1 from [place]. Patient oriented to room, call light, bed rails, phone, lights and bathroom. Patient instructed about the schedule of the day including: vital sign frequency, lab draws, possible tests, frequency of MD and staff rounds, including RN/MD rounding together at bedside, daily weights, and I &O's. Patient instructed about prescribed diet, how to use 8MENU, and television. bed alarm in place, patient aware of placement and reason. Telemetry box 3 in place, patient aware of placement and reason. Bed locked, in lowest position, side rails up 2/4, call light within reach. Will continue to monitor.

## 2020-07-02 NOTE — H&P
Hospital Medicine History & Physical      PCP: Juliano Portillo MD    Date of Admission: 7/1/2020    Date of Service: Pt seen/examined on 7/2/2020 and Admitted to observation with expected LOS less than two midnights due to medical therapy. Chief Complaint: Chest pain    History Of Present Illness:      78 y.o. female, with PMH of HTN, HLD, A. fib, and hypothyroidism, who was a direct admission to [facility] from [facility] ED. history was obtained from the patient and review of the EMR. The patient states that yesterday afternoon, she was sitting on the toilet having a bowel movement when she suddenly developed diffuse severe abdominal cramping. She states that it persisted even after she finished using the bathroom and decided to call EMS as her pain was so severe. By the time she was brought to [facility] ED, the patient states that her abdominal pain was completely gone. However, she does state that during this episode of abdominal pain, she suddenly felt an immense pressure that wrapped around her heart that radiated from her abdomen. She has never had this sensation before. She states she is chronically dyspneic and doesn't feel any differently. She does have history of atrial fibrillation and was changed from flecainide to sotalol by her cardiologist, Dr. Kimberly Miles, as she continued to flip into atrial fibrillation intermittently. The patient states that since she has been on the sotalol, she has been much more fatigued and sleeps a lot more. She is due to see her cardiologist again in a couple of months. The patient was noted to have marked bradycardia down in the 40s. She states she has not seen it this low before. During my exam, she denies any current symptoms. The patient denied any other associated symptoms as well as any aggravating and/or alleviating factors. At the time of this assessment, the patient was resting comfortably in bed.   She currently denies any chest pain, back pain, never used smokeless tobacco.  ETOH:   reports no history of alcohol use. Family History:      Reviewed in detail and negative for DM, CAD, Cancer, CVA. Positive as follows:    No family history on file. REVIEW OF SYSTEMS:   Pertinent positives as noted in the HPI. All other systems reviewed and negative. PHYSICAL EXAM PERFORMED:    BP (!) 184/90   Pulse 66   Temp 97.5 °F (36.4 °C) (Oral)   Resp 16   Ht 5' 4\" (1.626 m)   Wt 147 lb (66.7 kg)   SpO2 97%   BMI 25.23 kg/m²     General appearance: Pleasant female in no apparent distress, appears stated age and cooperative. HEENT:  Normal cephalic, atraumatic without obvious deformity. Pupils equal, round, and reactive to light. Extra ocular muscles intact. Conjunctivae/corneas clear. Neck: Supple, with full range of motion. No jugular venous distention. Trachea midline. Respiratory:  Normal respiratory effort. Clear to auscultation, bilaterally without Rales/Wheezes/Rhonchi. Cardiovascular:  Regular rate and rhythm with normal S1/S2 without murmurs, rubs or gallops. Abdomen: Soft, non-tender, non-distended with normal bowel sounds. Musculoskeletal:  No clubbing, cyanosis or edema bilaterally. Full range of motion without deformity. Skin: Skin color, texture, turgor normal.  No rashes or lesions. Neurologic:  Neurovascularly intact without any focal sensory/motor deficits.  Cranial nerves: II-XII intact, grossly non-focal.  Psychiatric:  Alert and oriented, thought content appropriate, normal insight  Capillary Refill: Brisk,< 3 seconds   Peripheral Pulses: +2 palpable, equal bilaterally       Labs:     Recent Labs     07/01/20  1630   WBC 6.1   HGB 14.1   HCT 43.0   *     Recent Labs     07/01/20  1630      K 4.0      CO2 28   BUN 22*   CREATININE 0.7   CALCIUM 9.4     Recent Labs     07/01/20  1630   AST 20   ALT 10   BILITOT 0.9   ALKPHOS 66     Recent Labs     07/01/20  1630   INR 1.84*     Recent Labs     07/01/20  1630 TROPONINI <0.01       Urinalysis:      Lab Results   Component Value Date    NITRU Negative 11/13/2010    WBCUA 4-5 11/13/2010    BACTERIA 2+ 11/13/2010    RBCUA 0-2 11/13/2010    BLOODU Trace-intact 11/13/2010    SPECGRAV 1.020 11/13/2010    GLUCOSEU Negative 11/13/2010       Radiology:     CXR: I have reviewed the CXR with the following interpretation: No acute cardiopulmonary findings  EKG:  I have reviewed the EKG with the following interpretation: SB, rate of 52 with 1st degree block    No orders to display       ASSESSMENT:    Active Hospital Problems    Diagnosis Date Noted    Atrial fibrillation ([facility] [address] [I48.91]     Hypertension [I10]     Restless legs syndrome (RLS) [G25.81]     Thyroid disease [E07.9]     Chest pain [R07.9] 07/01/2020         PLAN:    Chest pain in setting of no known HX of CAD. Could be related to symptomatic bradycardia. - Serial troponin - initial negative, second 0.03  - EKG w/o ischemic changes  - FLP in am  - NPO after MN  - Cardiology consulted, will defer additional testing to them  - PRN nitro  - Tele monitoring  - Heart score 4    Hx of chronic paroxysmal a. Fib, currently in SB on admission.  - Continue home xarelto  - On home sotalol - HELD in setting of bradycardia    Essential HTN, well controlled. - Continue home hctz  - Telemetry monitoring    Hypothyroidism, clinically euthyroid. - Continue home levothyroxine  - Follow up with PCP for med adjustments    Hx of HLD, well controlled with statin. - Continue home crestor  - Follow-up with PCP for med adjustments        DVT Prophylaxis: lovenox  Diet: Diet NPO Effective Now  Code Status: Full Code    PT/OT Eval Status: Not yet ordered    Dispo - Pending workup       Jessica Arboleda - NP    Thank you Kirk Greene MD for the opportunity to be involved in this patient's care.  If you have any questions or concerns please feel free to contact me at [phone].  -------------------------Anticipated Dr. han Jung-------------------

## 2020-07-02 NOTE — PROGRESS NOTES
Secure Text sent to Dr Cecile Garcia, \"Pt is back from the stress test. Diet ordered. She has family here to take her home and wanted to know if she will be DCd today. Will look for orders.  Thanks\"  Shea Braden

## 2020-07-03 NOTE — DISCHARGE SUMMARY
Hospital Medicine Discharge Summary    Patient ID: Montana Bolton      Patient's PCP: Claude Punter, MD    Admit Date: 7/1/2020     Discharge Date: 7/2/2020      Admitting Physician: Sara Saldana MD     Discharge Physician: Andi Ruiz MD     Discharge Diagnoses: Active Hospital Problems    Diagnosis    Elevated troponin [R79.89]    Atrial fibrillation (HCC) [I48.91]    Hypertension [I10]    Restless legs syndrome (RLS) [G25.81]    Thyroid disease [E07.9]    Chest pain [R07.9]       The patient was seen and examined on day of discharge and this discharge summary is in conjunction with any daily progress note from day of discharge. Hospital Course:       Chest pain - Concerning to ED for ACS, etiology clinically unable to determine. Initial enzymes/EKG negative. Followed serial enzymes and monitored on tele, w/out evidence of ischemia/arrythmia. Stress test negative for reversible ischemia.      Hx of chronic paroxysmal a. Fib, currently in SB on admission.  - Continue home xarelto  - On home sotalol - HELD in setting of bradycardia, resumed per Cardiology.      HTN - w/out known CAD and no evidence of active signs/sxs of ischemia/failure. Currently controlled on home meds w/ vitals reviewed and documented as above. HyperLipidemia - controlled on home Statin. Continue, w/ f/u and med adjustment w/ PCP    HypoThyroid - clinically euthyroid on oral replacement therapy. Continue, w/ outpt monitoring as previously arranged.           Labs:  For convenience and continuity at follow-up the following most recent labs are provided:      CBC:    Lab Results   Component Value Date    WBC 7.2 07/02/2020    HGB 13.9 07/02/2020    HCT 42.0 07/02/2020     07/02/2020       Renal:    Lab Results   Component Value Date     07/02/2020    K 3.6 07/02/2020     07/02/2020    CO2 29 07/02/2020    BUN 17 07/02/2020    CREATININE 0.7 07/02/2020    CALCIUM 9.3 07/02/2020 Significant Diagnostic Studies    Radiology:   NM Cardiac Stress Test Nuclear Imaging   Final Result             Consults:     IP CONSULT TO CARDIOLOGY    Disposition: home     Condition at Discharge: Stable    Discharge Instructions/Follow-up:  w/ PCP 1-2 weeks and subspecialists as arranged. Code Status:  Full Code    Activity: activity as tolerated    Diet: regular diet      Discharge Medications:     Discharge Medication List as of 7/2/2020  4:49 PM           Details   umeclidinium-vilanterol (ANORO ELLIPTA) 62.5-25 MCG/INH AEPB inhaler Inhale 1 puff into the lungs dailyHistorical Med      omeprazole (PRILOSEC) 20 MG delayed release capsule Take 20 mg by mouth dailyHistorical Med      fluticasone (ARNUITY ELLIPTA) 100 MCG/ACT AEPB Inhale into the lungs dailyHistorical Med      sotalol (BETAPACE) 80 MG tablet Take 80 mg by mouth 2 times dailyHistorical Med      potassium chloride (KLOR-CON M) 10 MEQ extended release tablet Take 10 mEq by mouth 2 times dailyHistorical Med      hydrochlorothiazide (HYDRODIURIL) 25 MG tablet Take 1 tablet by mouth daily, Disp-30 tablet, R-0      melatonin 3 MG TABS tablet Take 3 mg by mouth daily      rivaroxaban (XARELTO) 10 MG TABS tablet Take 20 mg by mouth Historical Med      Levothyroxine Sodium 50 MCG CAPS Take 50 mcg by mouth daily. rosuvastatin (CRESTOR) 10 MG tablet Take 10 mg by mouth nightly. citalopram (CELEXA) 20 MG tablet Take 10 mg by mouth daily. Time Spent on discharge is more than 30 minutes in the examination, evaluation, counseling and review of medications and discharge plan. Signed:    Esteban Cardenas MD   7/3/2020      Thank you Thony Bill MD for the opportunity to be involved in this patient's care. If you have any questions or concerns please feel free to contact me at [phone].

## 2020-08-01 PROBLEM — R77.8 ELEVATED TROPONIN: Status: RESOLVED | Noted: 2020-07-02 | Resolved: 2020-08-01

## 2020-08-01 PROBLEM — R79.89 ELEVATED TROPONIN: Status: RESOLVED | Noted: 2020-07-02 | Resolved: 2020-08-01

## 2020-12-10 ENCOUNTER — APPOINTMENT (OUTPATIENT)
Dept: CT IMAGING | Age: 80
End: 2020-12-10
Payer: MEDICARE

## 2020-12-10 ENCOUNTER — HOSPITAL ENCOUNTER (EMERGENCY)
Age: 80
Discharge: HOME | End: 2020-12-10
Attending: EMERGENCY MEDICINE
Payer: MEDICARE

## 2020-12-10 ENCOUNTER — APPOINTMENT (OUTPATIENT)
Dept: GENERAL RADIOLOGY | Age: 80
End: 2020-12-10
Payer: MEDICARE

## 2020-12-10 VITALS
SYSTOLIC BLOOD PRESSURE: 190 MMHG | HEART RATE: 57 BPM | DIASTOLIC BLOOD PRESSURE: 92 MMHG | OXYGEN SATURATION: 99 % | RESPIRATION RATE: 16 BRPM | TEMPERATURE: 98.2 F

## 2020-12-10 LAB
ANION GAP SERPL CALCULATED.3IONS-SCNC: 10 MMOL/L (ref 3–16)
APTT: 48.2 SEC (ref 24.2–36.2)
BACTERIA: ABNORMAL /HPF
BASOPHILS ABSOLUTE: 0.1 K/UL (ref 0–0.2)
BASOPHILS RELATIVE PERCENT: 0.8 %
BILIRUBIN URINE: NEGATIVE
BLOOD, URINE: ABNORMAL
BUN BLDV-MCNC: 26 MG/DL (ref 7–20)
CALCIUM SERPL-MCNC: 9.7 MG/DL (ref 8.3–10.6)
CHLORIDE BLD-SCNC: 102 MMOL/L (ref 99–110)
CLARITY: CLEAR
CO2: 28 MMOL/L (ref 21–32)
COLOR: YELLOW
CREAT SERPL-MCNC: 1.1 MG/DL (ref 0.6–1.2)
EOSINOPHILS ABSOLUTE: 0.1 K/UL (ref 0–0.6)
EOSINOPHILS RELATIVE PERCENT: 1.2 %
EPITHELIAL CELLS, UA: ABNORMAL /HPF (ref 0–5)
GFR AFRICAN AMERICAN: 58
GFR NON-AFRICAN AMERICAN: 48
GLUCOSE BLD-MCNC: 93 MG/DL (ref 70–99)
GLUCOSE URINE: NEGATIVE MG/DL
HCT VFR BLD CALC: 42.1 % (ref 36–48)
HEMOGLOBIN: 14 G/DL (ref 12–16)
INR BLD: 2.74 (ref 0.86–1.14)
KETONES, URINE: NEGATIVE MG/DL
LEUKOCYTE ESTERASE, URINE: ABNORMAL
LYMPHOCYTES ABSOLUTE: 1.6 K/UL (ref 1–5.1)
LYMPHOCYTES RELATIVE PERCENT: 21.4 %
MCH RBC QN AUTO: 30.2 PG (ref 26–34)
MCHC RBC AUTO-ENTMCNC: 33.3 G/DL (ref 31–36)
MCV RBC AUTO: 90.8 FL (ref 80–100)
MICROSCOPIC EXAMINATION: YES
MONOCYTES ABSOLUTE: 0.7 K/UL (ref 0–1.3)
MONOCYTES RELATIVE PERCENT: 9.3 %
NEUTROPHILS ABSOLUTE: 5 K/UL (ref 1.7–7.7)
NEUTROPHILS RELATIVE PERCENT: 67.3 %
NITRITE, URINE: NEGATIVE
PDW BLD-RTO: 14.5 % (ref 12.4–15.4)
PH UA: 5.5 (ref 5–8)
PLATELET # BLD: 132 K/UL (ref 135–450)
PMV BLD AUTO: 9.3 FL (ref 5–10.5)
POTASSIUM REFLEX MAGNESIUM: 3.8 MMOL/L (ref 3.5–5.1)
PROTEIN UA: NEGATIVE MG/DL
PROTHROMBIN TIME: 30.9 SEC (ref 10–13.2)
RBC # BLD: 4.64 M/UL (ref 4–5.2)
RBC UA: ABNORMAL /HPF (ref 0–4)
SODIUM BLD-SCNC: 140 MMOL/L (ref 136–145)
SPECIFIC GRAVITY UA: 1.02 (ref 1–1.03)
URINE REFLEX TO CULTURE: YES
URINE TYPE: ABNORMAL
UROBILINOGEN, URINE: 0.2 E.U./DL
WBC # BLD: 7.5 K/UL (ref 4–11)
WBC UA: ABNORMAL /HPF (ref 0–5)

## 2020-12-10 PROCEDURE — 29125 APPL SHORT ARM SPLINT STATIC: CPT

## 2020-12-10 PROCEDURE — 99284 EMERGENCY DEPT VISIT MOD MDM: CPT

## 2020-12-10 PROCEDURE — 81001 URINALYSIS AUTO W/SCOPE: CPT

## 2020-12-10 PROCEDURE — 70450 CT HEAD/BRAIN W/O DYE: CPT

## 2020-12-10 PROCEDURE — 72125 CT NECK SPINE W/O DYE: CPT

## 2020-12-10 PROCEDURE — 6370000000 HC RX 637 (ALT 250 FOR IP): Performed by: EMERGENCY MEDICINE

## 2020-12-10 PROCEDURE — 36415 COLL VENOUS BLD VENIPUNCTURE: CPT

## 2020-12-10 PROCEDURE — 85730 THROMBOPLASTIN TIME PARTIAL: CPT

## 2020-12-10 PROCEDURE — 87086 URINE CULTURE/COLONY COUNT: CPT

## 2020-12-10 PROCEDURE — 85025 COMPLETE CBC W/AUTO DIFF WBC: CPT

## 2020-12-10 PROCEDURE — 85610 PROTHROMBIN TIME: CPT

## 2020-12-10 PROCEDURE — 73130 X-RAY EXAM OF HAND: CPT

## 2020-12-10 PROCEDURE — 80048 BASIC METABOLIC PNL TOTAL CA: CPT

## 2020-12-10 RX ORDER — CEPHALEXIN 500 MG/1
500 CAPSULE ORAL 4 TIMES DAILY
Qty: 28 CAPSULE | Refills: 0 | Status: SHIPPED | OUTPATIENT
Start: 2020-12-10 | End: 2020-12-17

## 2020-12-10 RX ORDER — ACETAMINOPHEN 325 MG/1
650 TABLET ORAL ONCE
Status: COMPLETED | OUTPATIENT
Start: 2020-12-10 | End: 2020-12-10

## 2020-12-10 RX ORDER — CEPHALEXIN 500 MG/1
500 CAPSULE ORAL ONCE
Status: COMPLETED | OUTPATIENT
Start: 2020-12-10 | End: 2020-12-10

## 2020-12-10 RX ADMIN — CEPHALEXIN 500 MG: 500 CAPSULE ORAL at 22:31

## 2020-12-10 RX ADMIN — ACETAMINOPHEN 650 MG: 325 TABLET ORAL at 20:55

## 2020-12-10 ASSESSMENT — PAIN SCALES - GENERAL: PAINLEVEL_OUTOF10: 7

## 2020-12-11 NOTE — ED PROVIDER NOTES
[facility]      Pt Name: Aleksandra Marie  MRN: [id number]  [place] 1940  Date of evaluation: 12/10/2020  Provider: Beckie Olivares MD    CHIEF COMPLAINT       Chief Complaint   Patient presents with    Fall     pt. fell taking out the garbae approx. 2H ago, pt. reports no LOC,per pt. >3 falls in the last month, pt. states she does report actively taking xarelto. Pt. has hematoma to forhead and swelling accompanied with bruising in the L pinky and ring finger. HISTORY OF PRESENT ILLNESS   (Location/Symptom, Timing/Onset, Context/Setting, Quality, Duration, Modifying Factors, Severity)  Note limiting factors. Aleksandra Marie is a [de-identified] y.o. female with past medical history of hypertension, atrial fibrillation on anticoagulation here today after a fall. Patient states that 2 hours ago she was taking her trash out when she lost her footing and fell. She did hit her face. She also notes injury to her left hand. Having throbbing aching pain at the aforementioned sites. No obvious alleviating factors. She states she typically walks with a cane so when she is performing activities such as taking out her garbage it makes it especially challenging. Note she has fallen approximately 3 times in the past few weeks. Denies neck pain. No chest pain. No seizure-like activity. No shortness of breath no syncope. HPI    Nursing Notes were reviewed. REVIEW OF SYSTEMS    (2-9 systems for level 4, 10 or more for level 5)     Review of Systems    Please see HPI for pertinent positive and negative review of system findings. A full 10 system ROS was performed and otherwise negative.         PAST MEDICAL HISTORY     Past Medical History:   Diagnosis Date    Atrial fibrillation ([address]     Depression (disease)     Hypertension     Restless legs syndrome (RLS)     Thyroid disease     Urinary bladder disorder          SURGICAL HISTORY       Past Relationships    Social connections     Talks on phone: None     Gets together: None     Attends Gnosticist service: None     Active member of club or organization: None     Attends meetings of clubs or organizations: None     Relationship status: None    Intimate partner violence     Fear of current or ex partner: None     Emotionally abused: None     Physically abused: None     Forced sexual activity: None   Other Topics Concern    None   Social History Narrative    None       SCREENINGS    Madalyn Coma Scale  Eye Opening: Spontaneous  Best Verbal Response: Oriented  Best Motor Response: Obeys commands  Madalyn Coma Scale Score: 15          PHYSICAL EXAM    (up to 7 for level 4, 8 or more for level 5)     ED Triage Vitals [12/10/20 2033]   BP Temp Temp Source Pulse Resp SpO2 Height Weight   (!) 190/92 98.2 °F (36.8 °C) Oral 58 20 -- -- --       Physical Exam    General appearance:  Cooperative. No acute distress. Skin:  Warm. Dry. Eye:  Extraocular movements intact. Contusion above the left eye. Pupils are equal round reactive to light and accommodation. Extraocular motions are intact  Ears, nose, mouth and throat:  Oral mucosa moist,  Neck:  Trachea midline. No tenderness to palpation    Heart:  Regular rate and rhythm  Perfusion:  intact  Respiratory:  Lungs clear to auscultation bilaterally. Respirations nonlabored. Abdominal:   Non distended. Nontender  Neurological:  Alert and oriented x 3. Moves all extremities spontaneously  Musculoskeletal:   Soft tissue swelling with bruising ecchymosis of the left hand particularly over the left fourth and fifth metacarpal.  Limited range of motion of the fourth and fifth fingers in the left hand secondary to pain. Pelvis is stable.   Normal range of motion the lower extremities          Psychiatric:  Normal mood      DIAGNOSTIC RESULTS       Labs Reviewed   CBC WITH AUTO DIFFERENTIAL - Abnormal; Notable for the following components:       Result Value    Platelets 007 (*)     All other components within normal limits    Narrative:     Performed at:  [facility]. [facility] [place] Laboratory  [facility] [employer]   Phone [phone]   BASIC METABOLIC PANEL W/ REFLEX TO MG FOR LOW K - Abnormal; Notable for the following components:    BUN 26 (*)     GFR Non- 48 (*)     GFR  58 (*)     All other components within normal limits    Narrative:     Performed at:  [facility]. [facility] [place] [place] Laboratory  [address] [place], [place] [address]  [employer]   Phone [phone]   URINE RT REFLEX TO CULTURE - Abnormal; Notable for the following components:    Blood, Urine TRACE-INTACT (*)     Leukocyte Esterase, Urine TRACE (*)     All other components within normal limits    Narrative:     Performed at:  [facility]. [facility] [place] [place] [facility]  [facility] [employer] [facility]  [email or web address] ( - Abnormal; Notable for the following components:    Protime 30.9 (*)     INR 2.74 (*)     All other components within normal limits    Narrative:     Performed at:  [facility]. [facility] [place] [facility]  [facility] [employer] [facility]  [employer] [phone]   APTT - Abnormal; Notable for the following components:    aPTT 48.2 (*)     All other components within normal limits    Narrative:     Performed at:  [facility]. [facility] [place] [facility]  [facility] [employer] [facility]  [employer]   Phone [phone]   MICROSCOPIC URINALYSIS - Abnormal; Notable for the following components:    WBC, UA 10-20 (*)     Bacteria, UA 2+ (*)     All other components within normal limits    Narrative:     Performed at:  [facility]. [facility] [place] [facility]  [facility] [employer] [facility]  [employer]   Phone [phone]   CULTURE, URINE       Interpretation per the Radiologist below, if obtained/available to the left hand. Head CT and imaging of the neck showed no acute abnormality. Patient does have a closed fracture of the left fifth metacarpal.  Placed in a ulnar gutter splint. Post splinting had intact neurovascular status with good capillary refill distally in the hand. Patient tolerated this well. Will be given outpatient orthopedic follow-up. She was ultimately found to have an underlying urinary tract infection which may be an underlying contributor to her frequent falls. Was given Keflex here and will be discharged home with the same but otherwise should do well as an outpatient. MDM    CONSULTS     None    Critical Care:   None    REASSESSMENT          PROCEDURE     Unless otherwise noted below, none     Splint Application    Date/Time: 12/10/2020 10:18 PM  Performed by: Ondina Barnes MD  Authorized by: Ondina Barnes MD     Consent:     Consent obtained:  Verbal  Pre-procedure details:     Sensation:  Normal  Procedure details:     Laterality:  Left    Location:  Hand    Hand:  L hand    Splint type:  Ulnar gutter    Supplies:  Ortho-Glass  Post-procedure details:     Pain:  Unchanged    Sensation:  Normal    Patient tolerance of procedure: Tolerated well, no immediate complications          FINAL IMPRESSION      1. Fall, initial encounter    2. Closed head injury, initial encounter    3. Contusion of scalp, initial encounter    4. Unspecified fracture of fifth metacarpal bone, left hand, initial encounter for closed fracture    5.  Acute cystitis without hematuria            DISPOSITION/PLAN   DISPOSITION          PATIENT REFERRED TO:  Tea Zavaleta MD  [address] [place] [address] Dr. Maral Granados [address] [facility]  [phone]    Schedule an appointment as soon as possible for a visit       [place] [address]  [phone]    Schedule an appointment as soon as possible for a visit   Please call for Orthopedic follow up      DISCHARGE MEDICATIONS:  New Prescriptions CEPHALEXIN (KEFLEX) 500 MG CAPSULE    Take 1 capsule by mouth 4 times daily for 7 days     Controlled Substances Monitoring:     No flowsheet data found.     (Please note that portions of this note were completed with a voice recognition program.  Efforts were made to edit the dictations but occasionally words are mis-transcribed.)    Brett Wilkinson MD (electronically signed)  Attending Emergency Physician            Lakeshia Herron MD  12/10/20 [id number]

## 2020-12-12 LAB — URINE CULTURE, ROUTINE: NORMAL

## 2020-12-17 ENCOUNTER — OFFICE VISIT (OUTPATIENT)
Dept: ORTHOPEDIC SURGERY | Age: 80
End: 2020-12-17
Payer: MEDICARE

## 2020-12-17 PROBLEM — M79.642 PAIN OF LEFT HAND: Status: ACTIVE | Noted: 2020-12-17

## 2020-12-17 PROBLEM — S62.337A CLOSED DISPLACED FRACTURE OF NECK OF LEFT FIFTH METACARPAL BONE: Status: ACTIVE | Noted: 2020-12-17

## 2020-12-17 PROCEDURE — 99213 OFFICE O/P EST LOW 20 MIN: CPT | Performed by: ORTHOPAEDIC SURGERY

## 2020-12-17 PROCEDURE — L3809 WHFO W/O JOINTS PRE OTS: HCPCS | Performed by: ORTHOPAEDIC SURGERY

## 2020-12-17 PROCEDURE — L3660 SO 8 AB RSTR CAN/WEB PRE OTS: HCPCS | Performed by: ORTHOPAEDIC SURGERY

## 2020-12-17 NOTE — PROGRESS NOTES
fINGER INJURY VISIT      HISTORY OF PRESENT ILLNESS    Monserrat Lopez is a [de-identified] y.o. female who presents for evaluation of a left hand injury which occurred on 12/10/2020 when she fell on the sidewalk injuring her left hand and face. She went to the ER was told she had a fracture was splinted and referred here. She grades her pain 4/10 has a lot of swelling but feels the pain is just intermittent. Moving her hand aggravates it and ice seems to help. She is on Xarelto which probably prompted a lot of swelling and ecchymoses that she has had. ROS    Well-documented patient history form dated 12/17/2020  All other ROS negative except for above.     Past Surgical history    Past Surgical History:   Procedure Laterality Date    APPENDECTOMY      CYST REMOVAL      HYSTERECTOMY         PAST MEDICAL    Past Medical History:   Diagnosis Date    Atrial fibrillation ([facility] [address]     Depression (disease)     Hypertension     Restless legs syndrome (RLS)     Thyroid disease     Urinary bladder disorder        Allergies    No Known Allergies    Meds    Current Outpatient Medications   Medication Sig Dispense Refill    cephALEXin (KEFLEX) 500 MG capsule Take 1 capsule by mouth 4 times daily for 7 days 28 capsule 0    umeclidinium-vilanterol (ANORO ELLIPTA) 62.5-25 MCG/INH AEPB inhaler Inhale 1 puff into the lungs daily      omeprazole (PRILOSEC) 20 MG delayed release capsule Take 20 mg by mouth daily      fluticasone (ARNUITY ELLIPTA) 100 MCG/ACT AEPB Inhale into the lungs daily      sotalol (BETAPACE) 80 MG tablet Take 80 mg by mouth 2 times daily      potassium chloride (KLOR-CON M) 10 MEQ extended release tablet Take 10 mEq by mouth 2 times daily      hydrochlorothiazide (HYDRODIURIL) 25 MG tablet Take 1 tablet by mouth daily 30 tablet 0    melatonin 3 MG TABS tablet Take 3 mg by mouth daily      rivaroxaban (XARELTO) 10 MG TABS tablet Take 20 mg by mouth  Levothyroxine Sodium 50 MCG CAPS Take 50 mcg by mouth daily.  rosuvastatin (CRESTOR) 10 MG tablet Take 10 mg by mouth nightly.  citalopram (CELEXA) 20 MG tablet Take 10 mg by mouth daily. No current facility-administered medications for this visit. Social    Social History     Socioeconomic History    Marital status:      Spouse name: Not on file    Number of children: Not on file    Years of education: Not on file    Highest education level: Not on file   Occupational History    Not on file   Social Needs    Financial resource strain: Not on file    Food insecurity     Worry: Not on file     Inability: Not on file    Transportation needs     Medical: Not on file     Non-medical: Not on file   Tobacco Use    Smoking status: Former Smoker     Types: Cigarettes     Quit date: 2000     Years since quittin.0    Smokeless tobacco: Never Used   Substance and Sexual Activity    Alcohol use: No    Drug use: No    Sexual activity: Not Currently   Lifestyle    Physical activity     Days per week: Not on file     Minutes per session: Not on file    Stress: Not on file   Relationships    Social connections     Talks on phone: Not on file     Gets together: Not on file     Attends Shinto service: Not on file     Active member of club or organization: Not on file     Attends meetings of clubs or organizations: Not on file     Relationship status: Not on file    Intimate partner violence     Fear of current or ex partner: Not on file     Emotionally abused: Not on file     Physically abused: Not on file     Forced sexual activity: Not on file   Other Topics Concern    Not on file   Social History Narrative    Not on file       Family HISTORY    No family history on file. PHYSICAL EXAM    Vital Signs: There were no vitals taken for this visit. General Appearance:  Normal body habitus. Alert and oriented to person, place, and time.    Affect:  Normal. Gait:  Normal. Good balance and coordination. Reflexes:  Intact. Sensation:  Normal.   Pulses:  Normal.   Skin: She has substantial ecchymosis and swelling over her face and left hand. Hand Exam:    Hand dominance -right  Finger Exam -she has good axial rotational alignment of her fifth finger where she has pain over the neck of the fifth metacarpal.  The wrist is unremarkable. Sensory Exam -normal symmetric both upper extremities       Finger   Range of Motion Right Left   DIP Extension     DIP Flexion     PIP Extension     CMC     PIP Flexion     MP Extension     MP Flexion       IMAGING STUDIES    X-rays 3 views of the left hand demonstrate impacted fracture of the fifth metacarpal neck in good alignment of position    IMPRESSION    Closed left fifth metacarpal neck fracture    PLAN    I am going to put a TKO brace on her and have her use heat ice and contrast and exercise for swelling along with elevation. She should return in 6 weeks for x-ray, and a sling for protection        Procedures    Ivory Henriquez TKO     Patient was prescribed a Ivory Henriquez TKO. The left hand will require stabilization / immobilization from this semi-rigid / rigid orthosis to improve their function. The orthosis will assist in protecting the affected area, provide functional support and facilitate healing. The patient was educated and fit by a healthcare professional with expert knowledge and specialization in brace application while under the direct supervision of the physician. Verbal and written instructions for the use of and application of this item were provided. They were instructed to contact the office immediately should the brace result in increased pain, decreased sensation, increased swelling or worsening of the condition.    113 Turbocoating Shoulder Immobilizer Sling Patient was prescribed a DJO Shoulder Immobilizer Sling. The left ARM will require stabilization / immobilization from this orthosis. The orthosis will assist in protecting the affected area, provide functional support and facilitate healing. The patient was educated and fit by a healthcare professional with expert knowledge and specialization in brace application while under the direct supervision of the treating physician. Verbal and written instructions for the use of and application of this item were provided. They were instructed to contact the office immediately should the brace result in increased pain, decreased sensation, increased swelling or worsening of the condition.

## 2021-01-21 ENCOUNTER — OFFICE VISIT (OUTPATIENT)
Dept: ORTHOPEDIC SURGERY | Age: 81
End: 2021-01-21
Payer: MEDICARE

## 2021-01-21 DIAGNOSIS — S62.337D CLOSED DISPLACED FRACTURE OF NECK OF FIFTH METACARPAL BONE OF LEFT HAND WITH ROUTINE HEALING, SUBSEQUENT ENCOUNTER: Primary | ICD-10-CM

## 2021-01-21 DIAGNOSIS — M79.642 PAIN OF LEFT HAND: ICD-10-CM

## 2021-01-21 PROCEDURE — 99213 OFFICE O/P EST LOW 20 MIN: CPT | Performed by: ORTHOPAEDIC SURGERY

## 2021-01-21 NOTE — PROGRESS NOTES
fINGER INJURY VISIT      HISTORY OF PRESENT ILLNESS    Vidhya Carcamo is a [de-identified] y.o. female who presents for evaluation of a left fifth metacarpal neck fracture. She is about 6 weeks post injury. She is doing well no pain. She is here for x-rays and disposition. ROS    Well-documented patient history form dated 12/17/2020  All other ROS negative except for above. Past Surgical history    Past Surgical History:   Procedure Laterality Date    APPENDECTOMY      CYST REMOVAL      HYSTERECTOMY         PAST MEDICAL    Past Medical History:   Diagnosis Date    Atrial fibrillation ([address]     Depression (disease)     Hypertension     Restless legs syndrome (RLS)     Thyroid disease     Urinary bladder disorder        Allergies    No Known Allergies    Meds    Current Outpatient Medications   Medication Sig Dispense Refill    umeclidinium-vilanterol (ANORO ELLIPTA) 62.5-25 MCG/INH AEPB inhaler Inhale 1 puff into the lungs daily      omeprazole (PRILOSEC) 20 MG delayed release capsule Take 20 mg by mouth daily      fluticasone (ARNUITY ELLIPTA) 100 MCG/ACT AEPB Inhale into the lungs daily      sotalol (BETAPACE) 80 MG tablet Take 80 mg by mouth 2 times daily      potassium chloride (KLOR-CON M) 10 MEQ extended release tablet Take 10 mEq by mouth 2 times daily      hydrochlorothiazide (HYDRODIURIL) 25 MG tablet Take 1 tablet by mouth daily 30 tablet 0    melatonin 3 MG TABS tablet Take 3 mg by mouth daily      rivaroxaban (XARELTO) 10 MG TABS tablet Take 20 mg by mouth       Levothyroxine Sodium 50 MCG CAPS Take 50 mcg by mouth daily.  rosuvastatin (CRESTOR) 10 MG tablet Take 10 mg by mouth nightly.  citalopram (CELEXA) 20 MG tablet Take 10 mg by mouth daily. No current facility-administered medications for this visit. Social    Social History     Socioeconomic History    Marital status:       Spouse name: Not on file    Number of children: Not on file  Years of education: Not on file    Highest education level: Not on file   Occupational History    Not on file   Social Needs    Financial resource strain: Not on file    Food insecurity     Worry: Not on file     Inability: Not on file    Transportation needs     Medical: Not on file     Non-medical: Not on file   Tobacco Use    Smoking status: Former Smoker     Types: Cigarettes     Quit date: 2000     Years since quittin.1    Smokeless tobacco: Never Used   Substance and Sexual Activity    Alcohol use: No    Drug use: No    Sexual activity: Not Currently   Lifestyle    Physical activity     Days per week: Not on file     Minutes per session: Not on file    Stress: Not on file   Relationships    Social connections     Talks on phone: Not on file     Gets together: Not on file     Attends [employer] service: Not on file     Active member of club or organization: Not on file     Attends meetings of clubs or organizations: Not on file     Relationship status: Not on file    Intimate partner violence     Fear of current or ex partner: Not on file     Emotionally abused: Not on file     Physically abused: Not on file     Forced sexual activity: Not on file   Other Topics Concern    Not on file   Social History Narrative    Not on file       Family HISTORY    No family history on file. PHYSICAL EXAM    Vital Signs: There were no vitals taken for this visit. General Appearance:  Normal body habitus. Alert and oriented to person, place, and time. Affect:  Normal.   Gait:  Normal. Good balance and coordination. Reflexes:  Intact. Sensation:  Normal.   Pulses:  Normal.   Skin: She has substantial ecchymosis and swelling over her face and left hand.     Hand Exam:    Hand dominance -right  Finger Exam -she has good axial rotational alignment of her fifth finger  Sensory Exam -normal symmetric both upper extremities       Finger   Range of Motion Right Left   DIP Extension DIP Flexion     PIP Extension     CMC     PIP Flexion     MP Extension     MP Flexion       IMAGING STUDIES    X-rays 3 views of the left hand demonstrate impacted fracture of the fifth metacarpal neck in good alignment of position, and healed well    IMPRESSION    Closed left fifth metacarpal neck fracture, healed    PLAN    I would recommend eli taping for 6 weeks

## 2021-04-06 ENCOUNTER — TELEPHONE (OUTPATIENT)
Dept: MAMMOGRAPHY | Age: 81
End: 2021-04-06

## 2021-04-06 ENCOUNTER — HOSPITAL ENCOUNTER (OUTPATIENT)
Dept: MAMMOGRAPHY | Age: 81
Discharge: HOME | End: 2021-04-06
Payer: MEDICARE

## 2021-04-06 DIAGNOSIS — Z12.39 SCREENING BREAST EXAMINATION: ICD-10-CM

## 2021-04-06 PROCEDURE — 77063 BREAST TOMOSYNTHESIS BI: CPT

## 2021-04-08 ENCOUNTER — APPOINTMENT (OUTPATIENT)
Dept: GENERAL RADIOLOGY | Age: 81
End: 2021-04-08
Payer: MEDICARE

## 2021-04-08 ENCOUNTER — HOSPITAL ENCOUNTER (EMERGENCY)
Age: 81
Discharge: HOME | End: 2021-04-08
Payer: MEDICARE

## 2021-04-08 VITALS
SYSTOLIC BLOOD PRESSURE: 192 MMHG | HEART RATE: 55 BPM | BODY MASS INDEX: 27.66 KG/M2 | WEIGHT: 162 LBS | TEMPERATURE: 98.4 F | RESPIRATION RATE: 16 BRPM | DIASTOLIC BLOOD PRESSURE: 98 MMHG | OXYGEN SATURATION: 97 % | HEIGHT: 64 IN
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
## 2021-04-08 DIAGNOSIS — L03.113 CELLULITIS OF RIGHT UPPER EXTREMITY: ICD-10-CM

## 2021-04-08 PROCEDURE — 90471 IMMUNIZATION ADMIN: CPT | Performed by: NURSE PRACTITIONER

## 2021-04-08 PROCEDURE — 6360000002 HC RX W HCPCS: Performed by: NURSE PRACTITIONER

## 2021-04-08 PROCEDURE — 73110 X-RAY EXAM OF WRIST: CPT

## 2021-04-08 PROCEDURE — 90715 TDAP VACCINE 7 YRS/> IM: CPT | Performed by: NURSE PRACTITIONER

## 2021-04-08 PROCEDURE — 99284 EMERGENCY DEPT VISIT MOD MDM: CPT

## 2021-04-08 PROCEDURE — 6370000000 HC RX 637 (ALT 250 FOR IP): Performed by: NURSE PRACTITIONER

## 2021-04-08 RX ORDER — AMOXICILLIN AND CLAVULANATE POTASSIUM 875; 125 MG/1; MG/1
1 TABLET, FILM COATED ORAL ONCE
Status: COMPLETED | OUTPATIENT
Start: 2021-04-08 | End: 2021-04-08

## 2021-04-08 RX ORDER — AMOXICILLIN AND CLAVULANATE POTASSIUM 875; 125 MG/1; MG/1
1 TABLET, FILM COATED ORAL 2 TIMES DAILY
Qty: 20 TABLET | Refills: 0 | Status: SHIPPED | OUTPATIENT
Start: 2021-04-08 | End: 2021-04-18

## 2021-04-08 RX ADMIN — TETANUS TOXOID, REDUCED DIPHTHERIA TOXOID AND ACELLULAR PERTUSSIS VACCINE, ADSORBED 0.5 ML: 5; 2.5; 8; 8; 2.5 SUSPENSION INTRAMUSCULAR at 19:26

## 2021-04-08 RX ADMIN — AMOXICILLIN AND CLAVULANATE POTASSIUM 1 TABLET: 875; 125 TABLET, FILM COATED ORAL at 19:26

## 2021-04-08 ASSESSMENT — PAIN DESCRIPTION - LOCATION: LOCATION: WRIST

## 2021-04-08 ASSESSMENT — PAIN SCALES - GENERAL: PAINLEVEL_OUTOF10: 8

## 2021-04-08 NOTE — ED TRIAGE NOTES
Chief Complaint   Patient presents with    Animal Bite     bit by neighbor's cat last night, redness and swelling to right wrist area

## 2021-04-09 ASSESSMENT — ENCOUNTER SYMPTOMS
SHORTNESS OF BREATH: 0
COLOR CHANGE: 1
ABDOMINAL PAIN: 0
SORE THROAT: 0
RHINORRHEA: 0

## 2021-04-09 NOTE — ED PROVIDER NOTES
Evaluated by [id number] [facility] Provider          [facility]  EMERGENCY DEPARTMENT ENCOUNTER        Pt Name: Dianelys Alicea  MRN: [id number]  [place] 1940  Dateof evaluation: 4/8/2021  Provider: TERRI Peñaloza - CNP  PCP: Amadou Wright MD  ED Attending: No att. providers found    [facility]       Chief Complaint   Patient presents with    Animal Bite     bit by neighbor's cat last night, redness and swelling to right wrist area       HISTORY OF PRESENTILLNESS   (Location/Symptom, Timing/Onset, Context/Setting, Quality, Duration, Modifying Factors, Severity)  Note limiting factors. Dianelys Alicea is a [de-identified] y.o. female for cat bite. Onset was yesterday. Context includes patient reports that she was bit by a cat yesterday to the right wrist.  Patient reports that she is right-handed. Patient is unsure about her own tetanus but states the cat's immunizations are up-to-date. Alleviating factors include nothing. Aggravating factors include nothing. Pain is 8/10. Nothing has been used for pain today. Nursing Notes were all reviewed and agreed with or any disagreements were addressed  in the HPI. REVIEW OF SYSTEMS    (2-9 systems for level 4, 10 or more for level 5)     Review of Systems   Constitutional: Negative for fever. HENT: Negative for congestion, rhinorrhea and sore throat. Respiratory: Negative for shortness of breath. Cardiovascular: Negative for chest pain. Gastrointestinal: Negative for abdominal pain. Genitourinary: Negative for decreased urine volume and difficulty urinating. Musculoskeletal: Negative for arthralgias and myalgias. Skin: Positive for color change and wound. Negative for rash. Neurological: Negative for dizziness and light-headedness. Psychiatric/Behavioral: Negative for agitation. All other systems reviewed and are negative. Positives and Pertinent negatives as per HPI.   Except as noted above in the ROS, all other systems were reviewed and negative. PAST MEDICAL HISTORY     Past Medical History:   Diagnosis Date    Atrial fibrillation ([address]     Depression (disease)     Hypertension     Restless legs syndrome (RLS)     Thyroid disease     Urinary bladder disorder          SURGICAL HISTORY       Past Surgical History:   Procedure Laterality Date    APPENDECTOMY      CYST REMOVAL      HYSTERECTOMY           CURRENT MEDICATIONS       Discharge Medication List as of 4/8/2021  7:32 PM      CONTINUE these medications which have NOT CHANGED    Details   umeclidinium-vilanterol (ANORO ELLIPTA) 62.5-25 MCG/INH AEPB inhaler Inhale 1 puff into the lungs dailyHistorical Med      omeprazole (PRILOSEC) 20 MG delayed release capsule Take 20 mg by mouth dailyHistorical Med      fluticasone (ARNUITY ELLIPTA) 100 MCG/ACT AEPB Inhale into the lungs dailyHistorical Med      sotalol (BETAPACE) 80 MG tablet Take 80 mg by mouth 2 times dailyHistorical Med      potassium chloride (KLOR-CON M) 10 MEQ extended release tablet Take 10 mEq by mouth 2 times dailyHistorical Med      hydrochlorothiazide (HYDRODIURIL) 25 MG tablet Take 1 tablet by mouth daily, Disp-30 tablet, R-0      melatonin 3 MG TABS tablet Take 3 mg by mouth daily      rivaroxaban (XARELTO) 10 MG TABS tablet Take 20 mg by mouth Historical Med      Levothyroxine Sodium 50 MCG CAPS Take 50 mcg by mouth daily. rosuvastatin (CRESTOR) 10 MG tablet Take 10 mg by mouth nightly. citalopram (CELEXA) 20 MG tablet Take 10 mg by mouth daily. ALLERGIES     Patient has no known allergies. FAMILY HISTORY     History reviewed. No pertinent family history. SOCIAL HISTORY       Social History     Socioeconomic History    Marital status:       Spouse name: None    Number of children: None    Years of education: None    Highest education level: None   Occupational History    None   Social Needs    Financial resource strain: None   [employer] insecurity     Worry: None     Inability: None    Transportation needs     Medical: None     Non-medical: None   Tobacco Use    Smoking status: Former Smoker     Types: Cigarettes     Quit date: 2000     Years since quittin.3    Smokeless tobacco: Never Used   Substance and Sexual Activity    Alcohol use: No    Drug use: No    Sexual activity: Not Currently   Lifestyle    Physical activity     Days per week: None     Minutes per session: None    Stress: None   Relationships    Social connections     Talks on phone: None     Gets together: None     Attends Judaism service: None     Active member of club or organization: None     Attends meetings of clubs or organizations: None     Relationship status: None    Intimate partner violence     Fear of current or ex partner: None     Emotionally abused: None     Physically abused: None     Forced sexual activity: None   Other Topics Concern    None   Social History Narrative    None       SCREENINGS    Madalyn Coma Scale  Eye Opening: Spontaneous  Best Verbal Response: Oriented  Best Motor Response: Obeys commands  Madalyn Coma Scale Score: 15        PHYSICAL EXAM  (up to 7 for level 4, 8 or more for level 5)     ED Triage Vitals [21 1709]   BP Temp Temp Source Pulse Resp SpO2 Height Weight   (!) 211/94 98.4 °F (36.9 °C) Oral 55 16 97 % 5' 4\" (1.626 m) 162 lb (73.5 kg)       Physical Exam  Constitutional:       Appearance: She is well-developed. HENT:      Head: Normocephalic and atraumatic. Neck:      Musculoskeletal: Normal range of motion. Cardiovascular:      Rate and Rhythm: Normal rate. Pulmonary:      Effort: Pulmonary effort is normal. No respiratory distress. Abdominal:      General: There is no distension. Palpations: Abdomen is soft. Tenderness: There is no abdominal tenderness. Musculoskeletal:         General: Swelling, tenderness and signs of injury present. No deformity.       Comments: Decreased range of motion to the right wrist due to pain elicited with movement. Erythema and edema noted to the dorsal aspect of the right wrist and hand. Sensation and pulse intact to right hand. There were no concerns for flexor tenosynovitis at this time   Skin:     General: Skin is warm and dry. Neurological:      Mental Status: She is alert and oriented to person, place, and time. DIAGNOSTIC RESULTS   LABS:    Labs Reviewed - No data to display    All other labs werewithin normal range or not returned as of this dictation. EKG: All EKG's are interpreted by the Emergency Department Physician who either signs or Co-signs this chart in the absence of a cardiologist.  Please see their note for interpretation of EKG. RADIOLOGY:   Right wrist x-ray interpreted by radiologist for  FINDINGS:   The visualized bones are osteopenic.  There is no evidence of fracture or   dislocation. Degenerative changes of the 1st carpometacarpal joint.  The   remaining joint spaces appear well maintained. Mild soft tissue swelling. Chondrocalcinosis along the triangular fibrocartilage complex      Interpretation per the Radiologist below, if available at the time of this note:    XR WRIST RIGHT (MIN 3 VIEWS)   Final Result   No acute bony abnormalities are noted           Xr Wrist Right (min 3 Views)    Result Date: 4/8/2021  EXAMINATION: XRAY VIEWS OF THE RIGHT WRIST 4/8/2021 3:01 pm COMPARISON: 08/14/2011. HISTORY: ORDERING SYSTEM PROVIDED HISTORY: cat bite TECHNOLOGIST PROVIDED HISTORY: Reason for exam:->cat bite Reason for Exam: cat bit wrist yesterday Acuity: Acute Type of Exam: Initial FINDINGS: The visualized bones are osteopenic. There is no evidence of fracture or dislocation. Degenerative changes of the 1st carpometacarpal joint. The remaining joint spaces appear well maintained. Mild soft tissue swelling.  Chondrocalcinosis along the triangular fibrocartilage complex     No acute bony abnormalities are noted PROCEDURES   Unless otherwise noted below, none     Procedures     CRITICAL CARE TIME   N/A    CONSULTS:  None      EMERGENCYDEPARTMENT COURSE and DIFFERENTIAL DIAGNOSIS/MDM:   Vitals:    Vitals:    04/08/21 1709 04/08/21 1935   BP: (!) 211/94 (!) 192/98   Pulse: 55    Resp: 16    Temp: 98.4 °F (36.9 °C)    TempSrc: Oral    SpO2: 97%    Weight: 162 lb (73.5 kg)    Height: 5' 4\" (1.626 m)        Patient was given the following medications:  Medications   Tetanus-Diphth-Acell Pertussis (BOOSTRIX) injection 0.5 mL (0.5 mLs Intramuscular Given 4/8/21 1926)   amoxicillin-clavulanate (AUGMENTIN) 875-125 MG per tablet 1 tablet (1 tablet Oral Given 4/8/21 1926)       Patient was seen and evaluated by myself. Patient here for concerns for a cat bite that occurred yesterday to her right wrist.  Patient is right-hand dominant. She denies any fever. Patient states the cat's vaccinations are up-to-date. Patient was unsure about her own tetanus. On exam she is awake and alert hemodynamically stable nontoxic in appearance. Patient was provided with a tetanus in the ED and given Augmentin. She be discharged home with a prescription for Augmentin. She was given an orthopedic referral and encouraged to follow-up to ensure this heals. She was also encouraged to follow-up with her primary care doctor the next few days. Patient was ultimately discharged home with all questions answered. The patient tolerated their visit well. I have evaluated this patient. My supervising physician was available for consultation. The patient and / or the family were informed of the results of any tests, a time was given to answer questions, a plan was proposed and they agreed with plan. FINAL IMPRESSION      1. Cat bite, initial encounter    2.  Cellulitis of right upper extremity          DISPOSITION/PLAN   DISPOSITION Decision To Discharge 04/08/2021 07:58:04 PM      PATIENT REFERRED TO:  Jordan Palm MD  [address] [place] [address] Dr. [facility] [address] [facility]  [phone]    Schedule an appointment as soon as possible for a visit in 2 days  for re-evaluation    [place] [facility]  [address] [facility]  [phone]    If symptoms worsen    [address] [facility], [address] [place] [address]    [phone]  Schedule an appointment as soon as possible for a visit in 3 days  to be re evaluated. , for re-evaluation      DISCHARGE MEDICATIONS:  Discharge Medication List as of 4/8/2021  7:32 PM      START taking these medications    Details   amoxicillin-clavulanate (AUGMENTIN) 875-125 MG per tablet Take 1 tablet by mouth 2 times daily for 10 days, Disp-20 tablet, R-0Print             DISCONTINUED MEDICATIONS:  Discharge Medication List as of 4/8/2021  7:32 PM                 (Please note that portions of this note were completed with a voice recognition program.  Efforts were made to edit the dictations but occasionally words are mis-transcribed.)    TERRI Mejia CNP (electronically signed)         TERRI Mejia CNP  04/09/21 0015

## 2021-06-05 ENCOUNTER — HOSPITAL ENCOUNTER (EMERGENCY)
Age: 81
Discharge: HOME | End: 2021-06-05
Attending: EMERGENCY MEDICINE
Payer: MEDICARE

## 2021-06-05 VITALS
HEART RATE: 60 BPM | WEIGHT: 165 LBS | HEIGHT: 64 IN | SYSTOLIC BLOOD PRESSURE: 164 MMHG | RESPIRATION RATE: 17 BRPM | TEMPERATURE: 98.7 F | OXYGEN SATURATION: 97 % | BODY MASS INDEX: 28.17 KG/M2 | DIASTOLIC BLOOD PRESSURE: 80 MMHG

## 2021-06-05 DIAGNOSIS — M79.602 LEFT ARM PAIN: Primary | ICD-10-CM

## 2021-06-05 DIAGNOSIS — T14.8XXA MUSCLE STRAIN: ICD-10-CM

## 2021-06-05 PROCEDURE — 99284 EMERGENCY DEPT VISIT MOD MDM: CPT

## 2021-06-05 PROCEDURE — 6370000000 HC RX 637 (ALT 250 FOR IP): Performed by: EMERGENCY MEDICINE

## 2021-06-05 PROCEDURE — 6360000002 HC RX W HCPCS: Performed by: EMERGENCY MEDICINE

## 2021-06-05 PROCEDURE — 96374 THER/PROPH/DIAG INJ IV PUSH: CPT

## 2021-06-05 RX ORDER — METHYLPREDNISOLONE SODIUM SUCCINATE 125 MG/2ML
125 INJECTION, POWDER, LYOPHILIZED, FOR SOLUTION INTRAMUSCULAR; INTRAVENOUS ONCE
Status: COMPLETED | OUTPATIENT
Start: 2021-06-05 | End: 2021-06-05

## 2021-06-05 RX ORDER — METHOCARBAMOL 500 MG/1
500 TABLET, FILM COATED ORAL ONCE
Status: COMPLETED | OUTPATIENT
Start: 2021-06-05 | End: 2021-06-05

## 2021-06-05 RX ORDER — METHOCARBAMOL 500 MG/1
500 TABLET, FILM COATED ORAL 3 TIMES DAILY
Qty: 30 TABLET | Refills: 0 | Status: SHIPPED | OUTPATIENT
Start: 2021-06-05 | End: 2021-06-14

## 2021-06-05 RX ORDER — HYDROCODONE BITARTRATE AND ACETAMINOPHEN 5; 325 MG/1; MG/1
1 TABLET ORAL ONCE
Status: COMPLETED | OUTPATIENT
Start: 2021-06-05 | End: 2021-06-05

## 2021-06-05 RX ADMIN — HYDROCODONE BITARTRATE AND ACETAMINOPHEN 1 TABLET: 5; 325 TABLET ORAL at 23:27

## 2021-06-05 RX ADMIN — METHYLPREDNISOLONE SODIUM SUCCINATE 125 MG: 125 INJECTION, POWDER, FOR SOLUTION INTRAMUSCULAR; INTRAVENOUS at 22:26

## 2021-06-05 RX ADMIN — METHOCARBAMOL TABLETS 500 MG: 500 TABLET, COATED ORAL at 22:26

## 2021-06-05 ASSESSMENT — PAIN DESCRIPTION - PAIN TYPE: TYPE: ACUTE PAIN

## 2021-06-05 ASSESSMENT — PAIN DESCRIPTION - DESCRIPTORS: DESCRIPTORS: STABBING

## 2021-06-05 ASSESSMENT — PAIN DESCRIPTION - PROGRESSION: CLINICAL_PROGRESSION: NOT CHANGED

## 2021-06-05 ASSESSMENT — PAIN SCALES - GENERAL: PAINLEVEL_OUTOF10: 6

## 2021-06-05 ASSESSMENT — PAIN DESCRIPTION - LOCATION: LOCATION: ARM

## 2021-06-05 ASSESSMENT — PAIN DESCRIPTION - ORIENTATION: ORIENTATION: LEFT;POSTERIOR;UPPER

## 2021-06-06 NOTE — ED PROVIDER NOTES
by mouth daily      rivaroxaban (XARELTO) 10 MG TABS tablet Take 20 mg by mouth       Levothyroxine Sodium 50 MCG CAPS Take 50 mcg by mouth daily.  rosuvastatin (CRESTOR) 10 MG tablet Take 10 mg by mouth nightly.  citalopram (CELEXA) 20 MG tablet Take 10 mg by mouth daily. No Known Allergies    REVIEW OF SYSTEMS  10 systems reviewed, pertinent positives per HPI otherwise noted to be negative. PHYSICAL EXAM  BP (!) 164/80   Pulse 60   Temp 98.7 °F (37.1 °C) (Oral)   Resp 17   Ht 5' 4\" (1.626 m)   Wt 165 lb (74.8 kg)   SpO2 97%   Breastfeeding No   BMI 28.32 kg/m²    Physical exam:  General appearance: awake and cooperative. Mild pain distress. Non toxic appearing. Skin: Warm and dry. No rashes or lesions. HENT: Normocephalic. Atraumatic. Mucus membranes are moist   Neck: supple. No midline tenderness; there is reproducible tenderness to palpation left trapezius and left rhomboid, also to left cervical paraspinals   Eyes: RED. EOM intact. Heart: RRR. No murmurs. Lungs: Respirations unlabored. CTAB. No wheezes, rales, or rhonchi. Good air exchange  Abdomen: No tenderness. Soft. Non distended. No peritoneal signs. Musculoskeletal: tenderness to palpation to left upper arm posteriorly; bicpes and triceps intact; no soft tissue swelling or erythema. No extremity edema. Compartments soft. No deformity. No tenderness in the extremities. All extremities neurovascularly intact. Radial, Dp, and PT pulses +2/4 bilaterally  Neurological: Alert and oriented. No focal deficits. No aphasia or dysarthria. No gait ataxia. Psychiatric: Normal mood and affect.      High Sensitivity Neuro Exam (HSNE) Spine  If Neck Pain  C1-2, 3, 4 Sensation back of head and neck: Present  C5 Deltoid (motor): Present  C6 Biceps (motor): Present  C7 Extend Wrist/Fingers: Present  C8 Flex Fingers: Present  T1 Move fingers apart: Present    If Thoracic Back Pain (also check femoral pulses):  T1 Move fingers apart: Present  T2-T12 Trunk Sensation: Present    LABS  I have reviewed all labs for this visit. No results found for this visit on 06/05/21. ECG    RADIOLOGY    ED COURSE/MDM  Patient seen and evaluated. Old records reviewed. Labs and imaging reviewed and results discussed with patient. This is an 75-year-old female presenting with left arm pain and left back pain. Blood pressure is elevated on arrival, this resolves after pain control here. She is otherwise not tachycardic or febrile, she is well-appearing on exam.  She describes feeling pain in her left arm and left back after hanging a bird bell. The pain is reproducible with movement, as well as tender to palpation. She was diagnosed with radiculopathy by primary care and placed on prednisone, she is about 6 days and without much improvement. On exam, she is neurovascularly intact. She has mildly decreased drink in her left hand due to a prior injury, but does have cardinal functions of hand intact at her baseline. The pain is clearly reproducible and occurred after an injury, I suspect this is a muscle strain with potential radiculopathy. I do not feel that imaging would add to the clinical picture at this time. The patient is neurologically intact, does not exhibit any signs of infection or red flag signs for epidural abscess or process affecting spinal cord. Her red flag score 0 she does not require further testing. I also do not suspect dissection as she has equal pulses and again the pain is reproducible. She denies chest pain or shortness of breath, I do not suspect ACS equivalent as again the pain is musculoskeletal.  The patient is given Solu-Medrol IM here. She is also given 500 of Robaxin with some improvement in her pain. She states that she had improvement of pain at rest, but did have pain with movement. Therefore she is given a Norco here as well. She is encouraged to continue the Solu-Medrol prescribed by primary care.   She is also told to take Robaxin but to be cautious as this can make her fatigue. She is told to follow back up with primary care. She is also given a referral to physical therapy at [facility]. I did speak with her and her daughter regarding strict return precautions back to the ED, they voiced understanding. Red Flags  Minor (1 Point Each)  Alcohol Abuse: +0 No  Diabetes Mellitus: +0 No  Renal Failure: +0 No  Night Pain: +1 yes  3rd visit in last 20 days: +0 No    Major (3 Points Each)  IV Drug Use: +0 No  Fever without focus: +0 No  Recent/Current Systemic Infection: +0 No  Immunosuppression (can include chemotherapy, advanced cancer, severe malnutrition, chronic immunosuppressive drugs): +0 No  Recent Spinal Fracture/Procedure (if patient is also on anticoagulation [warfarin, heparin, and NOAC] strongly consider MRI): +0 No  Incontinence or retention: +0 No  Indwelling urinary catheter: +0 No    Red flag score: 1    Patient presents to ED for evaluation of back pain. No history of direct trauma. Neurovascular exam in the ER is unremarkable for any deficits. Vitals signs have been reviewed and are stable. They are afebrile and nontoxic appearing, but in moderate distress due to pain. Pain was addressed with pain medication. HSNE Spine was without abnormal findings and Red Flag Score evaluated. Red flag score was less than or equal to 3 therefore ESR was not ordered. Further imaging was not ordered because red flag score was less than or equal to 3..      I completed a structured, evidence-based clinical evaluation to screen for acute non-traumatic spinal emergencies. The patient has a normal detailed neurologic exam and a low red flag score. The evidence indicates that the patient is very low risk for an acute spinal emergency and this is consistent with my clinical intuition.  The risk of further workup or hospitalization is likely higher than the likelihood of the patient having a spinal epidural abscess or other dangerous emergency spinal condition It is, therefore, in the patients best interest not to do additional emergent testing. I have discussed with the patient my clinical impression and the result of an evidence-based clinical evaluation to screen for spinal epidural abscess and other spinal emergencies, as well as the risk of further testing and hospitalization. The evidence shows that the risk for an acute spinal emergency is less than 1%. Although the risk of an acute spinal emergency has not been eliminated, the risks of further testing likely exceed the benefit, and the patient declines further emergent evaluation or hospitalization for dangerous emergency spinal conditions. On reevaluation patient is feeling improved. Based on the history and exam, there is no significant evidence of fracture, spinal cord compression, central disc herniation, cauda equina syndrome, osteomyelitis, epidural abscess, epidural hematoma, other focal infection, mass, tumor, unstable spinal column, or other process requiring immediate medical or surgical intervention at this time. Based on the history, exam, and ED work-up, it appears the patients symptoms are due to a muscle spasm. At this time I feel they are stable for d/c home with pain has been controlled, and their v/s are stable. The diagnosis, expected course, discharge medications, and follow-up plan (with their PMD) were discussed. It is understood that if they are not improving as expected or if other new symptoms or signs of concern develop, other etiologies or diagnoses may need to be considered requiring other tests, treatments, consultations, and/or admission. Return precautions were discussed and all questions were answered.       During the patient's ED course, the patient was given:  Medications   methocarbamol (ROBAXIN) tablet 500 mg (500 mg Oral Given 6/5/21 2226)   methylPREDNISolone sodium (SOLU-MEDROL) injection 125 mg (125 mg Intravenous Given 6/5/21 2226)   HYDROcodone-acetaminophen (NORCO) 5-325 MG per tablet 1 tablet (1 tablet Oral Given 6/5/21 2327)        CLINICAL IMPRESSION  1. Left arm pain    2. Muscle strain        Blood pressure (!) 164/80, pulse 60, temperature 98.7 °F (37.1 °C), temperature source Oral, resp. rate 17, height 5' 4\" (1.626 m), weight 165 lb (74.8 kg), SpO2 97 %, not currently breastfeeding. Patient was given scripts for the following medications. I counseled patient how to take these medications. Discharge Medication List as of 6/5/2021 11:25 PM      START taking these medications    Details   methocarbamol (ROBAXIN) 500 MG tablet Take 1 tablet by mouth 3 times daily for 10 days, Disp-30 tablet, R-0Normal             Follow-up with:  Jordan Palm MD  [address] [place] [address]   [address] [address] [facility]  [phone]    Schedule an appointment as soon as possible for a visit in 2 days        DISCLAIMER: This chart was created using Dragon dictation software. Efforts were made by me to ensure accuracy, however some errors may be present due to limitations of this technology and occasionally words are not transcribed correctly.        Ivana 32, DO  06/06/21 0700

## 2021-06-07 ENCOUNTER — HOSPITAL ENCOUNTER (OUTPATIENT)
Dept: GENERAL RADIOLOGY | Age: 81
Discharge: HOME | DRG: 177 | End: 2021-06-07
Payer: MEDICARE

## 2021-06-07 ENCOUNTER — HOSPITAL ENCOUNTER (OUTPATIENT)
Age: 81
Discharge: HOME | DRG: 177 | End: 2021-06-07
Payer: MEDICARE

## 2021-06-07 DIAGNOSIS — M25.512 LEFT SHOULDER PAIN, UNSPECIFIED CHRONICITY: ICD-10-CM

## 2021-06-07 PROCEDURE — 73060 X-RAY EXAM OF HUMERUS: CPT

## 2021-06-09 ENCOUNTER — APPOINTMENT (OUTPATIENT)
Dept: CT IMAGING | Age: 81
DRG: 177 | End: 2021-06-09
Payer: MEDICARE

## 2021-06-09 ENCOUNTER — HOSPITAL ENCOUNTER (INPATIENT)
Age: 81
LOS: 7 days | Discharge: NURSING, REHAB OR LONG-TERM CARE | DRG: 177 | End: 2021-06-16
Attending: EMERGENCY MEDICINE | Admitting: INTERNAL MEDICINE
Payer: MEDICARE

## 2021-06-09 DIAGNOSIS — M16.11 PRIMARY OSTEOARTHRITIS OF RIGHT HIP: ICD-10-CM

## 2021-06-09 DIAGNOSIS — E83.42 HYPOMAGNESEMIA: ICD-10-CM

## 2021-06-09 DIAGNOSIS — I48.91 ATRIAL FIBRILLATION WITH RAPID VENTRICULAR RESPONSE (HCC): ICD-10-CM

## 2021-06-09 DIAGNOSIS — R07.81 RIB PAIN ON RIGHT SIDE: ICD-10-CM

## 2021-06-09 DIAGNOSIS — M79.602 LEFT ARM PAIN: ICD-10-CM

## 2021-06-09 DIAGNOSIS — E87.6 HYPOKALEMIA: ICD-10-CM

## 2021-06-09 DIAGNOSIS — A41.9 SEPTICEMIA (HCC): ICD-10-CM

## 2021-06-09 DIAGNOSIS — J18.9 PNEUMONIA OF RIGHT LOWER LOBE DUE TO INFECTIOUS ORGANISM: Primary | ICD-10-CM

## 2021-06-09 LAB
A/G RATIO: 1.1 (ref 1.1–2.2)
ALBUMIN SERPL-MCNC: 3.4 G/DL (ref 3.4–5)
ALP BLD-CCNC: 97 U/L (ref 40–129)
ALT SERPL-CCNC: 12 U/L (ref 10–40)
ANION GAP SERPL CALCULATED.3IONS-SCNC: 18 MMOL/L (ref 3–16)
ANION GAP SERPL CALCULATED.3IONS-SCNC: 9 MMOL/L (ref 3–16)
AST SERPL-CCNC: 12 U/L (ref 15–37)
BACTERIA: ABNORMAL /HPF
BASOPHILS ABSOLUTE: 0 K/UL (ref 0–0.2)
BASOPHILS RELATIVE PERCENT: 0 %
BILIRUB SERPL-MCNC: 1.5 MG/DL (ref 0–1)
BILIRUBIN URINE: ABNORMAL
BLOOD, URINE: ABNORMAL
BUN BLDV-MCNC: 13 MG/DL (ref 7–20)
BUN BLDV-MCNC: 15 MG/DL (ref 7–20)
CALCIUM SERPL-MCNC: 7.9 MG/DL (ref 8.3–10.6)
CALCIUM SERPL-MCNC: 9 MG/DL (ref 8.3–10.6)
CHLORIDE BLD-SCNC: 91 MMOL/L (ref 99–110)
CHLORIDE BLD-SCNC: 97 MMOL/L (ref 99–110)
CLARITY: CLEAR
CO2: 24 MMOL/L (ref 21–32)
CO2: 24 MMOL/L (ref 21–32)
COLOR: ABNORMAL
CREAT SERPL-MCNC: 0.6 MG/DL (ref 0.6–1.2)
CREAT SERPL-MCNC: 0.7 MG/DL (ref 0.6–1.2)
EKG ATRIAL RATE: 91 BPM
EKG DIAGNOSIS: NORMAL
EKG Q-T INTERVAL: 382 MS
EKG QRS DURATION: 106 MS
EKG QTC CALCULATION (BAZETT): 502 MS
EKG R AXIS: -63 DEGREES
EKG T AXIS: 72 DEGREES
EKG VENTRICULAR RATE: 104 BPM
EOSINOPHILS ABSOLUTE: 0 K/UL (ref 0–0.6)
EOSINOPHILS RELATIVE PERCENT: 0 %
EPITHELIAL CELLS, UA: ABNORMAL /HPF (ref 0–5)
GFR AFRICAN AMERICAN: >60
GFR AFRICAN AMERICAN: >60
GFR NON-AFRICAN AMERICAN: >60
GFR NON-AFRICAN AMERICAN: >60
GLOBULIN: 3.2 G/DL
GLUCOSE BLD-MCNC: 105 MG/DL (ref 70–99)
GLUCOSE BLD-MCNC: 149 MG/DL (ref 70–99)
GLUCOSE BLD-MCNC: 156 MG/DL (ref 70–99)
GLUCOSE URINE: NEGATIVE MG/DL
HCT VFR BLD CALC: 43.8 % (ref 36–48)
HEMOGLOBIN: 14.5 G/DL (ref 12–16)
KETONES, URINE: ABNORMAL MG/DL
LACTIC ACID, SEPSIS: 1.1 MMOL/L (ref 0.4–1.9)
LACTIC ACID, SEPSIS: 1.8 MMOL/L (ref 0.4–1.9)
LEUKOCYTE ESTERASE, URINE: NEGATIVE
LIPASE: 7 U/L (ref 13–60)
LYMPHOCYTES ABSOLUTE: 0.7 K/UL (ref 1–5.1)
LYMPHOCYTES RELATIVE PERCENT: 4 %
MAGNESIUM: 1.7 MG/DL (ref 1.8–2.4)
MCH RBC QN AUTO: 29 PG (ref 26–34)
MCHC RBC AUTO-ENTMCNC: 33.1 G/DL (ref 31–36)
MCV RBC AUTO: 87.7 FL (ref 80–100)
MICROSCOPIC EXAMINATION: YES
MONOCYTES ABSOLUTE: 1.2 K/UL (ref 0–1.3)
MONOCYTES RELATIVE PERCENT: 7 %
MUCUS: ABNORMAL /LPF
NEUTROPHILS ABSOLUTE: 15.8 K/UL (ref 1.7–7.7)
NEUTROPHILS RELATIVE PERCENT: 89 %
NITRITE, URINE: NEGATIVE
PDW BLD-RTO: 15.8 % (ref 12.4–15.4)
PERFORMED ON: ABNORMAL
PH UA: 6.5 (ref 5–8)
PLATELET # BLD: 165 K/UL (ref 135–450)
PLATELET SLIDE REVIEW: ADEQUATE
PMV BLD AUTO: 9.1 FL (ref 5–10.5)
POTASSIUM REFLEX MAGNESIUM: 2.7 MMOL/L (ref 3.5–5.1)
POTASSIUM SERPL-SCNC: 3.6 MMOL/L (ref 3.5–5.1)
PROTEIN UA: 100 MG/DL
RBC # BLD: 5 M/UL (ref 4–5.2)
RBC UA: ABNORMAL /HPF (ref 0–4)
SARS-COV-2, NAAT: NOT DETECTED
SLIDE REVIEW: ABNORMAL
SODIUM BLD-SCNC: 130 MMOL/L (ref 136–145)
SODIUM BLD-SCNC: 133 MMOL/L (ref 136–145)
SPECIFIC GRAVITY UA: 1.02 (ref 1–1.03)
TOTAL PROTEIN: 6.6 G/DL (ref 6.4–8.2)
TROPONIN: <0.01 NG/ML
URINE REFLEX TO CULTURE: YES
URINE TYPE: ABNORMAL
UROBILINOGEN, URINE: 1 E.U./DL
WBC # BLD: 17.8 K/UL (ref 4–11)
WBC UA: ABNORMAL /HPF (ref 0–5)

## 2021-06-09 PROCEDURE — 36415 COLL VENOUS BLD VENIPUNCTURE: CPT

## 2021-06-09 PROCEDURE — 87040 BLOOD CULTURE FOR BACTERIA: CPT

## 2021-06-09 PROCEDURE — 92526 ORAL FUNCTION THERAPY: CPT

## 2021-06-09 PROCEDURE — 94761 N-INVAS EAR/PLS OXIMETRY MLT: CPT

## 2021-06-09 PROCEDURE — 2700000000 HC OXYGEN THERAPY PER DAY

## 2021-06-09 PROCEDURE — 93005 ELECTROCARDIOGRAM TRACING: CPT | Performed by: EMERGENCY MEDICINE

## 2021-06-09 PROCEDURE — 85025 COMPLETE CBC W/AUTO DIFF WBC: CPT

## 2021-06-09 PROCEDURE — 96365 THER/PROPH/DIAG IV INF INIT: CPT

## 2021-06-09 PROCEDURE — 2580000003 HC RX 258: Performed by: PHYSICIAN ASSISTANT

## 2021-06-09 PROCEDURE — 6360000002 HC RX W HCPCS: Performed by: EMERGENCY MEDICINE

## 2021-06-09 PROCEDURE — 83735 ASSAY OF MAGNESIUM: CPT

## 2021-06-09 PROCEDURE — 99221 1ST HOSP IP/OBS SF/LOW 40: CPT | Performed by: NURSE PRACTITIONER

## 2021-06-09 PROCEDURE — 80053 COMPREHEN METABOLIC PANEL: CPT

## 2021-06-09 PROCEDURE — 83690 ASSAY OF LIPASE: CPT

## 2021-06-09 PROCEDURE — 6370000000 HC RX 637 (ALT 250 FOR IP): Performed by: PHYSICIAN ASSISTANT

## 2021-06-09 PROCEDURE — 93010 ELECTROCARDIOGRAM REPORT: CPT | Performed by: INTERNAL MEDICINE

## 2021-06-09 PROCEDURE — 94640 AIRWAY INHALATION TREATMENT: CPT

## 2021-06-09 PROCEDURE — 71260 CT THORAX DX C+: CPT

## 2021-06-09 PROCEDURE — 6360000004 HC RX CONTRAST MEDICATION: Performed by: EMERGENCY MEDICINE

## 2021-06-09 PROCEDURE — 2580000003 HC RX 258: Performed by: EMERGENCY MEDICINE

## 2021-06-09 PROCEDURE — 83605 ASSAY OF LACTIC ACID: CPT

## 2021-06-09 PROCEDURE — 87086 URINE CULTURE/COLONY COUNT: CPT

## 2021-06-09 PROCEDURE — 92610 EVALUATE SWALLOWING FUNCTION: CPT

## 2021-06-09 PROCEDURE — 96361 HYDRATE IV INFUSION ADD-ON: CPT

## 2021-06-09 PROCEDURE — 6370000000 HC RX 637 (ALT 250 FOR IP): Performed by: NURSE PRACTITIONER

## 2021-06-09 PROCEDURE — 6370000000 HC RX 637 (ALT 250 FOR IP): Performed by: EMERGENCY MEDICINE

## 2021-06-09 PROCEDURE — 84484 ASSAY OF TROPONIN QUANT: CPT

## 2021-06-09 PROCEDURE — 81001 URINALYSIS AUTO W/SCOPE: CPT

## 2021-06-09 PROCEDURE — 96375 TX/PRO/DX INJ NEW DRUG ADDON: CPT

## 2021-06-09 PROCEDURE — 1200000000 HC SEMI PRIVATE

## 2021-06-09 PROCEDURE — 6360000002 HC RX W HCPCS: Performed by: NURSE PRACTITIONER

## 2021-06-09 PROCEDURE — 99285 EMERGENCY DEPT VISIT HI MDM: CPT

## 2021-06-09 PROCEDURE — 87635 SARS-COV-2 COVID-19 AMP PRB: CPT

## 2021-06-09 RX ORDER — POTASSIUM CHLORIDE 7.45 MG/ML
10 INJECTION INTRAVENOUS
Status: DISPENSED | OUTPATIENT
Start: 2021-06-09 | End: 2021-06-09

## 2021-06-09 RX ORDER — SODIUM CHLORIDE 0.9 % (FLUSH) 0.9 %
10 SYRINGE (ML) INJECTION PRN
Status: DISCONTINUED | OUTPATIENT
Start: 2021-06-09 | End: 2021-06-16 | Stop reason: HOSPADM

## 2021-06-09 RX ORDER — MORPHINE SULFATE 4 MG/ML
4 INJECTION, SOLUTION INTRAMUSCULAR; INTRAVENOUS ONCE
Status: COMPLETED | OUTPATIENT
Start: 2021-06-09 | End: 2021-06-09

## 2021-06-09 RX ORDER — OXYCODONE HYDROCHLORIDE AND ACETAMINOPHEN 5; 325 MG/1; MG/1
1 TABLET ORAL EVERY 4 HOURS PRN
Status: DISCONTINUED | OUTPATIENT
Start: 2021-06-09 | End: 2021-06-10

## 2021-06-09 RX ORDER — ROSUVASTATIN CALCIUM 10 MG/1
10 TABLET, COATED ORAL NIGHTLY
Status: DISCONTINUED | OUTPATIENT
Start: 2021-06-09 | End: 2021-06-16 | Stop reason: HOSPADM

## 2021-06-09 RX ORDER — 0.9 % SODIUM CHLORIDE 0.9 %
1000 INTRAVENOUS SOLUTION INTRAVENOUS ONCE
Status: COMPLETED | OUTPATIENT
Start: 2021-06-09 | End: 2021-06-09

## 2021-06-09 RX ORDER — ACETAMINOPHEN 650 MG/1
650 SUPPOSITORY RECTAL EVERY 6 HOURS PRN
Status: DISCONTINUED | OUTPATIENT
Start: 2021-06-09 | End: 2021-06-16 | Stop reason: HOSPADM

## 2021-06-09 RX ORDER — PANTOPRAZOLE SODIUM 40 MG/1
40 TABLET, DELAYED RELEASE ORAL
Status: DISCONTINUED | OUTPATIENT
Start: 2021-06-09 | End: 2021-06-16 | Stop reason: HOSPADM

## 2021-06-09 RX ORDER — SODIUM CHLORIDE 0.9 % (FLUSH) 0.9 %
5-40 SYRINGE (ML) INJECTION EVERY 12 HOURS SCHEDULED
Status: DISCONTINUED | OUTPATIENT
Start: 2021-06-09 | End: 2021-06-16 | Stop reason: HOSPADM

## 2021-06-09 RX ORDER — IPRATROPIUM BROMIDE AND ALBUTEROL SULFATE 2.5; .5 MG/3ML; MG/3ML
1 SOLUTION RESPIRATORY (INHALATION) 2 TIMES DAILY
Status: DISCONTINUED | OUTPATIENT
Start: 2021-06-09 | End: 2021-06-09

## 2021-06-09 RX ORDER — SODIUM CHLORIDE 9 MG/ML
25 INJECTION, SOLUTION INTRAVENOUS PRN
Status: DISCONTINUED | OUTPATIENT
Start: 2021-06-09 | End: 2021-06-16 | Stop reason: HOSPADM

## 2021-06-09 RX ORDER — IPRATROPIUM BROMIDE AND ALBUTEROL SULFATE 2.5; .5 MG/3ML; MG/3ML
1 SOLUTION RESPIRATORY (INHALATION)
Status: DISCONTINUED | OUTPATIENT
Start: 2021-06-09 | End: 2021-06-09

## 2021-06-09 RX ORDER — POTASSIUM CHLORIDE 750 MG/1
10 TABLET, EXTENDED RELEASE ORAL 2 TIMES DAILY
Status: DISCONTINUED | OUTPATIENT
Start: 2021-06-09 | End: 2021-06-16

## 2021-06-09 RX ORDER — LANOLIN ALCOHOL/MO/W.PET/CERES
3 CREAM (GRAM) TOPICAL DAILY
Status: DISCONTINUED | OUTPATIENT
Start: 2021-06-09 | End: 2021-06-10

## 2021-06-09 RX ORDER — LEVOTHYROXINE SODIUM 0.05 MG/1
50 TABLET ORAL
Status: DISCONTINUED | OUTPATIENT
Start: 2021-06-09 | End: 2021-06-16 | Stop reason: HOSPADM

## 2021-06-09 RX ORDER — IPRATROPIUM BROMIDE AND ALBUTEROL SULFATE 2.5; .5 MG/3ML; MG/3ML
1 SOLUTION RESPIRATORY (INHALATION) EVERY 4 HOURS PRN
Status: DISCONTINUED | OUTPATIENT
Start: 2021-06-09 | End: 2021-06-16 | Stop reason: HOSPADM

## 2021-06-09 RX ORDER — ACETAMINOPHEN 325 MG/1
650 TABLET ORAL EVERY 6 HOURS PRN
Status: DISCONTINUED | OUTPATIENT
Start: 2021-06-09 | End: 2021-06-16 | Stop reason: HOSPADM

## 2021-06-09 RX ORDER — KETOROLAC TROMETHAMINE 30 MG/ML
15 INJECTION, SOLUTION INTRAMUSCULAR; INTRAVENOUS EVERY 6 HOURS PRN
Status: DISCONTINUED | OUTPATIENT
Start: 2021-06-09 | End: 2021-06-10

## 2021-06-09 RX ORDER — ONDANSETRON 2 MG/ML
4 INJECTION INTRAMUSCULAR; INTRAVENOUS EVERY 6 HOURS PRN
Status: DISCONTINUED | OUTPATIENT
Start: 2021-06-09 | End: 2021-06-14

## 2021-06-09 RX ORDER — MAGNESIUM SULFATE IN WATER 40 MG/ML
2000 INJECTION, SOLUTION INTRAVENOUS ONCE
Status: COMPLETED | OUTPATIENT
Start: 2021-06-09 | End: 2021-06-09

## 2021-06-09 RX ORDER — AZITHROMYCIN 250 MG/1
500 TABLET, FILM COATED ORAL EVERY 24 HOURS
Status: COMPLETED | OUTPATIENT
Start: 2021-06-10 | End: 2021-06-12

## 2021-06-09 RX ORDER — SOTALOL HYDROCHLORIDE 80 MG/1
80 TABLET ORAL 2 TIMES DAILY
Status: DISCONTINUED | OUTPATIENT
Start: 2021-06-09 | End: 2021-06-14

## 2021-06-09 RX ORDER — ONDANSETRON 2 MG/ML
4 INJECTION INTRAMUSCULAR; INTRAVENOUS ONCE
Status: COMPLETED | OUTPATIENT
Start: 2021-06-09 | End: 2021-06-09

## 2021-06-09 RX ORDER — CITALOPRAM 20 MG/1
10 TABLET ORAL DAILY
Status: DISCONTINUED | OUTPATIENT
Start: 2021-06-09 | End: 2021-06-16 | Stop reason: HOSPADM

## 2021-06-09 RX ORDER — POLYETHYLENE GLYCOL 3350 17 G/17G
17 POWDER, FOR SOLUTION ORAL DAILY PRN
Status: DISCONTINUED | OUTPATIENT
Start: 2021-06-09 | End: 2021-06-16 | Stop reason: HOSPADM

## 2021-06-09 RX ORDER — SODIUM CHLORIDE 9 MG/ML
INJECTION, SOLUTION INTRAVENOUS CONTINUOUS
Status: DISCONTINUED | OUTPATIENT
Start: 2021-06-09 | End: 2021-06-11

## 2021-06-09 RX ORDER — ONDANSETRON 4 MG/1
4 TABLET, ORALLY DISINTEGRATING ORAL EVERY 8 HOURS PRN
Status: DISCONTINUED | OUTPATIENT
Start: 2021-06-09 | End: 2021-06-14

## 2021-06-09 RX ORDER — IPRATROPIUM BROMIDE AND ALBUTEROL SULFATE 2.5; .5 MG/3ML; MG/3ML
1 SOLUTION RESPIRATORY (INHALATION) 4 TIMES DAILY
Status: DISCONTINUED | OUTPATIENT
Start: 2021-06-09 | End: 2021-06-10

## 2021-06-09 RX ADMIN — CITALOPRAM HYDROBROMIDE 10 MG: 20 TABLET ORAL at 12:40

## 2021-06-09 RX ADMIN — IPRATROPIUM BROMIDE AND ALBUTEROL SULFATE 1 AMPULE: .5; 3 SOLUTION RESPIRATORY (INHALATION) at 19:51

## 2021-06-09 RX ADMIN — CEFTRIAXONE SODIUM 1000 MG: 1 INJECTION, POWDER, FOR SOLUTION INTRAMUSCULAR; INTRAVENOUS at 08:44

## 2021-06-09 RX ADMIN — IPRATROPIUM BROMIDE AND ALBUTEROL SULFATE 1 AMPULE: .5; 3 SOLUTION RESPIRATORY (INHALATION) at 12:20

## 2021-06-09 RX ADMIN — IOPAMIDOL 75 ML: 755 INJECTION, SOLUTION INTRAVENOUS at 06:50

## 2021-06-09 RX ADMIN — SODIUM CHLORIDE 1000 ML: 9 INJECTION, SOLUTION INTRAVENOUS at 06:33

## 2021-06-09 RX ADMIN — ROSUVASTATIN CALCIUM 10 MG: 10 TABLET, FILM COATED ORAL at 21:21

## 2021-06-09 RX ADMIN — ONDANSETRON HYDROCHLORIDE 4 MG: 2 INJECTION, SOLUTION INTRAMUSCULAR; INTRAVENOUS at 08:42

## 2021-06-09 RX ADMIN — PANTOPRAZOLE SODIUM 40 MG: 40 TABLET, DELAYED RELEASE ORAL at 12:40

## 2021-06-09 RX ADMIN — DEXTROSE MONOHYDRATE 500 MG: 50 INJECTION, SOLUTION INTRAVENOUS at 09:32

## 2021-06-09 RX ADMIN — MAGNESIUM SULFATE HEPTAHYDRATE 2000 MG: 40 INJECTION, SOLUTION INTRAVENOUS at 08:49

## 2021-06-09 RX ADMIN — RIVAROXABAN 20 MG: 20 TABLET, FILM COATED ORAL at 18:06

## 2021-06-09 RX ADMIN — Medication 10 ML: at 12:40

## 2021-06-09 RX ADMIN — SODIUM CHLORIDE 1000 ML: 9 INJECTION, SOLUTION INTRAVENOUS at 08:41

## 2021-06-09 RX ADMIN — Medication 3 MG: at 21:23

## 2021-06-09 RX ADMIN — MORPHINE SULFATE 4 MG: 4 INJECTION, SOLUTION INTRAMUSCULAR; INTRAVENOUS at 06:33

## 2021-06-09 RX ADMIN — LEVOTHYROXINE SODIUM 50 MCG: 50 TABLET ORAL at 12:40

## 2021-06-09 RX ADMIN — MORPHINE SULFATE 4 MG: 4 INJECTION, SOLUTION INTRAMUSCULAR; INTRAVENOUS at 08:40

## 2021-06-09 RX ADMIN — SOTALOL HYDROCHLORIDE 80 MG: 80 TABLET ORAL at 21:21

## 2021-06-09 RX ADMIN — KETOROLAC TROMETHAMINE 15 MG: 30 INJECTION, SOLUTION INTRAMUSCULAR; INTRAVENOUS at 16:17

## 2021-06-09 RX ADMIN — OXYCODONE HYDROCHLORIDE AND ACETAMINOPHEN 1 TABLET: 5; 325 TABLET ORAL at 23:25

## 2021-06-09 RX ADMIN — SODIUM CHLORIDE: 9 INJECTION, SOLUTION INTRAVENOUS at 12:49

## 2021-06-09 RX ADMIN — POTASSIUM CHLORIDE 10 MEQ: 750 TABLET, EXTENDED RELEASE ORAL at 12:40

## 2021-06-09 RX ADMIN — Medication 10 ML: at 21:23

## 2021-06-09 RX ADMIN — POTASSIUM BICARBONATE 60 MEQ: 782 TABLET, EFFERVESCENT ORAL at 06:59

## 2021-06-09 RX ADMIN — IPRATROPIUM BROMIDE AND ALBUTEROL SULFATE 1 AMPULE: .5; 3 SOLUTION RESPIRATORY (INHALATION) at 15:51

## 2021-06-09 RX ADMIN — POTASSIUM CHLORIDE 10 MEQ: 7.46 INJECTION, SOLUTION INTRAVENOUS at 07:03

## 2021-06-09 RX ADMIN — POTASSIUM CHLORIDE 10 MEQ: 750 TABLET, EXTENDED RELEASE ORAL at 21:23

## 2021-06-09 RX ADMIN — ONDANSETRON 4 MG: 2 INJECTION INTRAMUSCULAR; INTRAVENOUS at 06:32

## 2021-06-09 RX ADMIN — ACETAMINOPHEN 650 MG: 325 TABLET ORAL at 12:39

## 2021-06-09 ASSESSMENT — PAIN DESCRIPTION - DESCRIPTORS
DESCRIPTORS: SHOOTING

## 2021-06-09 ASSESSMENT — PAIN DESCRIPTION - ORIENTATION
ORIENTATION: LEFT

## 2021-06-09 ASSESSMENT — PAIN DESCRIPTION - LOCATION
LOCATION: ARM

## 2021-06-09 ASSESSMENT — PAIN SCALES - GENERAL
PAINLEVEL_OUTOF10: 8
PAINLEVEL_OUTOF10: 8
PAINLEVEL_OUTOF10: 9
PAINLEVEL_OUTOF10: 6
PAINLEVEL_OUTOF10: 8
PAINLEVEL_OUTOF10: 6
PAINLEVEL_OUTOF10: 0
PAINLEVEL_OUTOF10: 8

## 2021-06-09 ASSESSMENT — PAIN DESCRIPTION - PROGRESSION
CLINICAL_PROGRESSION: NOT CHANGED

## 2021-06-09 ASSESSMENT — PAIN DESCRIPTION - FREQUENCY
FREQUENCY: CONTINUOUS
FREQUENCY: CONTINUOUS

## 2021-06-09 ASSESSMENT — PAIN DESCRIPTION - PAIN TYPE
TYPE: ACUTE PAIN

## 2021-06-09 ASSESSMENT — PAIN - FUNCTIONAL ASSESSMENT
PAIN_FUNCTIONAL_ASSESSMENT: ACTIVITIES ARE NOT PREVENTED
PAIN_FUNCTIONAL_ASSESSMENT: ACTIVITIES ARE NOT PREVENTED

## 2021-06-09 ASSESSMENT — PAIN DESCRIPTION - ONSET
ONSET: ON-GOING
ONSET: ON-GOING

## 2021-06-09 ASSESSMENT — HEART SCORE: ECG: 1

## 2021-06-09 NOTE — CARE COORDINATION
Case Management Assessment  Initial Evaluation      Patient Name: Bradley Pillai  YOB: 1940  Diagnosis: Pneumonia [J18.9]  Date / Time: 6/9/2021  5:30 AM    Admission status/Date:  06/09/2021  Chart Reviewed: Yes      Patient Interviewed: Yes   Family Interviewed:  No      Hospitalization in the last 30 days:  No      Health Care Decision Maker :   Primary Decision MakerErneftaly [facility]  [phone]        Met with: patient  Interview conducted  (bedside/phone): bedside    Current PCP: Marie Polanco MD    Financial  Medicare/Medicaid  Precert required for SNF : NA         3 night stay required - YES    ADLS  Support Systems/Care Needs: Children  Transportation: self (short distances)    Meal Preparation: self ([facility] on hold per pt)    Housing  Living Arrangements: Lives alone in single story senior apt  Steps: none  Intent for return to present living arrangements: Yes  Identified Issues: NA    Home Care Information  Active with [address] : No Agency:(Services)     Passport/Waiver : [employer]SP[employer]  :                      Phone Number:    Passport/Waiver Services: [facility] (on hold per patient)       Durable Medical Equiptment   DME Provider: JUAN  Equipment: JUAN  Walker___Cane_X__RTS___ BSC___Shower Chair___Hospital Bed___W/C____Other________  02 at ____Liter(s)---wears(frequency)_______ [facility] ___ CPAP___ BiPap___   N/A____      Home O2 Use :  No , pt requiring supplemental O2 since admission. CM will follow and reassess for new continued need. Informed of need for care provider to bring portable home O2 tank on day of discharge for nursing to connect prior to leaving:   Not Indicated  Verbalized agreement/Understanding:   Not Indicated    Community Service Affiliation  Dialysis:  No    · Agency:  · Location:  · Dialysis Schedule:  · Phone:   · Fax:     Other Community Services: (ex:PT/OT,Mental Health,Wound Clinic, [address] [place] [address] Mynor Lynch)    DISCHARGE PLAN: Explained Case Management role/services. Chart reviewed. Met with pt at bedside and explained the role of the CM. Plans to return home. IPTA. Pt interested in [place] [address] for SN/PT/OT([facility]) and is also interested in [facility] for HHA. She has [facility] but has them on hold for now and does not want to resume at this time. +CM will follow.

## 2021-06-09 NOTE — ED NOTES
Report called to Shahram Doyle RN @ [facility] in [employer]. Pt remains resting with OU closed and without c/o's. Resps even & unlab. Awaiting EMS for transport.  Pts daughter @ bedside     Mack Novoa RN  06/09/21 1013

## 2021-06-09 NOTE — ED NOTES
Pt remains unable to void for ordered UA. MD made aware.  [facility] here to transport pt     Nico Nava RN  06/09/21 1484

## 2021-06-09 NOTE — ED PROVIDER NOTES
CHIEF COMPLAINT  Arm Pain (C/O left elbow, left shoulder pain x 1 week, denies injury)      HISTORY OF PRESENT ILLNESS  Dianelys Alicea is a [de-identified] y.o. female presents to the ED with left arm pain, starting about a week ago, atraumatic but thought it may have been related to hanging a bird seed bell when she noticed onset of symptoms, possible muscle spasm/strain per prior ED note, and bilateral/right worse than left sided rib pain starting a couple days ago, also with cough noted around that time as well, brought in by EMS, had left humerus x-rays a couple days ago per PCP on 6/7/21, seen here on the 6/5/21 for back and left arm pain. She has been Covid vaccinated x2 doses back in February, history of A. fib on Xarelto, no fall or bleeding issues recently, no anterior chest pain, she reports the pain is making her short of breath, she had been trying to take the hydrocodone pain medication but was getting nauseous/vomiting, no melena/hematochezia/hematemesis, had not been eating/drinking well because of this, and had not been taking all of her medication including second doses of potassium supplements each day, daughter came in later, no abdominal pain, no headache/neck stiffness, no change in the arm/shoulder blade area pain, no known fevers, no other complaints, modifying factors or associated symptoms. I have reviewed the following from the nursing documentation. Past Medical History:   Diagnosis Date    Atrial fibrillation (HCC)     Depression (disease)     Hypertension     Restless legs syndrome (RLS)     Thyroid disease     Urinary bladder disorder      Past Surgical History:   Procedure Laterality Date    APPENDECTOMY      CYST REMOVAL      HYSTERECTOMY       History reviewed. No pertinent family history. Social History     Socioeconomic History    Marital status:       Spouse name: Not on file    Number of children: Not on file    Years of education: Not on file    Highest education level: Not on file   Occupational History    Not on file   Tobacco Use    Smoking status: Former Smoker     Types: Cigarettes     Quit date: 2000     Years since quittin.5    Smokeless tobacco: Never Used   Substance and Sexual Activity    Alcohol use: No    Drug use: No    Sexual activity: Not Currently   Other Topics Concern    Not on file   Social History Narrative    Not on file     Social Determinants of Health     Financial Resource Strain:     Difficulty of Paying Living Expenses:    Food Insecurity:     Worried About Running Out of Food in the Last Year:     [address] in the Last Year:    Transportation Needs:     Lack of Transportation (Medical):      Lack of Transportation (Non-Medical):    Physical Activity:     Days of Exercise per Week:     Minutes of Exercise per Session:    Stress:     Feeling of Stress :    Social Connections:     Frequency of Communication with Friends and Family:     Frequency of Social Gatherings with Friends and Family:     Attends Yazidism Services:     Active Member of Clubs or Organizations:     Attends Club or Organization Meetings:     Marital Status:    Intimate Partner Violence:     Fear of Current or Ex-Partner:     Emotionally Abused:     Physically Abused:     Sexually Abused:      Current Facility-Administered Medications   Medication Dose Route Frequency Provider Last Rate Last Admin    potassium chloride 10 mEq/100 mL IVPB (Peripheral Line)  10 mEq Intravenous Q1H Carlena Petties,  mL/hr at 21 0703 10 mEq at 21 0703    morphine sulfate (PF) injection 4 mg  4 mg Intravenous Once Carlena Petties, DO        magnesium sulfate 2000 mg in 50 mL IVPB premix  2,000 mg Intravenous Once Carlena Petties, DO        cefTRIAXone (ROCEPHIN) 1,000 mg in sterile water 10 mL IV syringe  1,000 mg Intravenous Once Carlena Petties, DO        azithromycin (ZITHROMAX) 500 mg in D5W 250ml addavial  500 mg Intravenous Once Peter Tish, DO        0.9 % sodium chloride bolus  1,000 mL Intravenous Once Peter Tish, DO         Current Outpatient Medications   Medication Sig Dispense Refill    umeclidinium-vilanterol (ANORO ELLIPTA) 62.5-25 MCG/INH AEPB inhaler Inhale 1 puff into the lungs daily      omeprazole (PRILOSEC) 20 MG delayed release capsule Take 20 mg by mouth daily      fluticasone (ARNUITY ELLIPTA) 100 MCG/ACT AEPB Inhale into the lungs daily      sotalol (BETAPACE) 80 MG tablet Take 80 mg by mouth 2 times daily      potassium chloride (KLOR-CON M) 10 MEQ extended release tablet Take 10 mEq by mouth 2 times daily      hydrochlorothiazide (HYDRODIURIL) 25 MG tablet Take 1 tablet by mouth daily 30 tablet 0    melatonin 3 MG TABS tablet Take 3 mg by mouth daily      rivaroxaban (XARELTO) 10 MG TABS tablet Take 20 mg by mouth       Levothyroxine Sodium 50 MCG CAPS Take 50 mcg by mouth daily.  rosuvastatin (CRESTOR) 10 MG tablet Take 10 mg by mouth nightly.  citalopram (CELEXA) 20 MG tablet Take 10 mg by mouth daily. No Known Allergies    REVIEW OF SYSTEMS  10 systems reviewed, pertinent positives per HPI otherwise noted to be negative. PHYSICAL EXAM  BP (!) 140/98   Pulse 111   Temp 98.2 °F (36.8 °C) (Oral)   Resp 19   Ht 5' 5\" (1.651 m)   Wt 158 lb (71.7 kg)   SpO2 91%   Breastfeeding No   BMI 26.29 kg/m²   GENERAL APPEARANCE: Awake and alert. Cooperative. No acute distress  HEAD: Normocephalic. Atraumatic. EYES: PERRL. EOM's grossly intact. ENT: Mucous membranes are tacky. Airway patent, no stridor, no otorrhea or rhinorrhea  NECK: Supple. No rigidity  HEART: Irregularly irregular, rate around 110, tachycardic, no murmurs  LUNGS: Respirations tachypneic in the 20s. Lungs are slightly diminished in the right base with few crackles, no wheezing or rhonchi noted. No significant chest wall tenderness to palpation  ABDOMEN: Soft. Non-distended. Non-tender. No guarding or rebound. Normal bowel sounds. EXTREMITIES: No peripheral edema. No calf tenderness, moves all extremities equally. SKIN: Warm and dry. No acute rashes. No vesicular lesions/rashes of the flank or areas of pain  NEUROLOGICAL: Alert and oriented. No gross facial drooping. Strength 5/5, sensation intact. No truncal ataxia. Normal speech  PSYCHIATRIC: Normal mood and affect. Appears anxious, occasionally moaning/writhing in the bed, especially if nurse or I are in the room     LABS  I have reviewed all labs for this visit.    Results for orders placed or performed during the hospital encounter of 06/09/21   CBC Auto Differential   Result Value Ref Range    WBC 17.8 (H) 4.0 - 11.0 K/uL    RBC 5.00 4.00 - 5.20 M/uL    Hemoglobin 14.5 12.0 - 16.0 g/dL    Hematocrit 43.8 36.0 - 48.0 %    MCV 87.7 80.0 - 100.0 fL    MCH 29.0 26.0 - 34.0 pg    MCHC 33.1 31.0 - 36.0 g/dL    RDW 15.8 (H) 12.4 - 15.4 %    Platelets 315 294 - 924 K/uL    MPV 9.1 5.0 - 10.5 fL    PLATELET SLIDE REVIEW Adequate     SLIDE REVIEW see below     Neutrophils % 89.0 %    Lymphocytes % 4.0 %    Monocytes % 7.0 %    Eosinophils % 0.0 %    Basophils % 0.0 %    Neutrophils Absolute 15.8 (H) 1.7 - 7.7 K/uL    Lymphocytes Absolute 0.7 (L) 1.0 - 5.1 K/uL    Monocytes Absolute 1.2 0.0 - 1.3 K/uL    Eosinophils Absolute 0.0 0.0 - 0.6 K/uL    Basophils Absolute 0.0 0.0 - 0.2 K/uL   Comprehensive Metabolic Panel w/ Reflex to MG   Result Value Ref Range    Sodium 133 (L) 136 - 145 mmol/L    Potassium reflex Magnesium 2.7 (LL) 3.5 - 5.1 mmol/L    Chloride 91 (L) 99 - 110 mmol/L    CO2 24 21 - 32 mmol/L    Anion Gap 18 (H) 3 - 16    Glucose 105 (H) 70 - 99 mg/dL    BUN 15 7 - 20 mg/dL    CREATININE 0.7 0.6 - 1.2 mg/dL    GFR Non-African American >60 >60    GFR African American >60 >60    Calcium 9.0 8.3 - 10.6 mg/dL    Total Protein 6.6 6.4 - 8.2 g/dL    Albumin 3.4 3.4 - 5.0 g/dL    Albumin/Globulin Ratio 1.1 1.1 - 2.2    Total Bilirubin 1.5 (H) 0.0 - 1.0 mg/dL    Alkaline Phosphatase 97 40 - 129 U/L    ALT 12 10 - 40 U/L    AST 12 (L) 15 - 37 U/L    Globulin 3.2 g/dL   Troponin   Result Value Ref Range    Troponin <0.01 <0.01 ng/mL   Lipase   Result Value Ref Range    Lipase 7.0 (L) 13.0 - 60.0 U/L   Magnesium   Result Value Ref Range    Magnesium 1.70 (L) 1.80 - 2.40 mg/dL   EKG 12 Lead   Result Value Ref Range    Ventricular Rate 104 BPM    Atrial Rate 91 BPM    QRS Duration 106 ms    Q-T Interval 382 ms    QTc Calculation (Bazett) 502 ms    R Axis -63 degrees    T Axis 72 degrees    Diagnosis       Atrial fibrillation with rapid ventricular response with premature ventricular or aberrantly conducted complexesLeft axis deviationAnterolateral infarct (cited on or before 02-JAN-2006)Abnormal ECGWhen compared with ECG of 01-JUL-2020 17:38,Atrial fibrillation has replaced Sinus rhythmVent. rate has increased BY  52 BPMQT has lengthenedConfirmed by Evie Godoy MD, [address] ([id number]) on 6/9/2021 6:54:57 AM        RADIOLOGY  XR HUMERUS LEFT (MIN 2 VIEWS)    Result Date: 6/8/2021  EXAMINATION: TWO XRAY VIEWS OF THE LEFT HUMERUS 6/7/2021 5:15 pm COMPARISON: None. HISTORY: ORDERING SYSTEM PROVIDED HISTORY: Left shoulder pain, unspecified chronicity TECHNOLOGIST PROVIDED HISTORY: Reason for Exam: very painful shoulder and aching into proximal humerus. no known trauma,   she hung up a bird seed bell yesterday and had to reach up to do so,  she feels that the pain started after that. Acuity: Acute Type of Exam: Initial FINDINGS: There is no evidence of fracture, malalignment, or other acute osseous abnormality. There arm moderate degenerative changes in the acromioclavicular and glenohumeral joints. There is elevation of the humeral head consistent with chronic rotator cuff tear. No acute osseous abnormality.      CT CHEST PULMONARY EMBOLISM W CONTRAST (Final result)  Result time 06/09/21 07:17:13  Final result by Selma Quiles MD (06/09/21 07:17:13) Impression:    1. Negative for pulmonary embolus. 2. Right lower lobe pneumonia with small parapneumonic effusion and reactive   right hilar/mediastinal adenopathy. Heart Score:  History: 0  EC  Patient Age: 2  *Risk factors for Atherosclerotic disease: Hypertension  Risk Factors: 1  Troponin: 0  Heart Score Total: 4        ED COURSE/MDM  Patient seen and evaluated. Old records reviewed. Labs and imaging reviewed and results discussed with patient/daughter.   80-year-old female with primary complaint of right-sided rib pain, but also with cough, sats in the low 90s, I was initially concerned with possibility of PE, CT performed, no PE noted but right lower lobe pneumonia, she had leukocytosis, ordered lactate, cultures, community-acquired pneumonia antibiotics Rocephin/azithromycin, she also has musculoskeletal pain of the left arm that she already had x-ray for a couple days ago, low suspicion for Covid, she reports she has not been taking all of her potassium tablets, and K was 2.7 today, replaced with 60 mEq orally and 20 mEq IV, and replaced low magnesium at 1.7 with 2 g of mag IV, patient was very anxious/moaning in pain at times, daughter reports she has a low pain tolerance, she did require multiple pain medication doses here, low suspicion for ACS/PE/dissection, negative troponin, though she would warrant a repeat with a heart score of 4, she is in A. fib with RVR, though rate does improve to the  range when patient is calm and pain controlled, history of A. fib on Xarelto, fluid resuscitating/treating infection prior to other meds to reduce rate, we do have a respiratory source given the leukocytosis, tachycardia, she is meeting sepsis criteria, but obtaining urine as well, no hypotension, 2 L bolus fluids plus the other meds IV make up 30 cc/kg, patient and daughter verbalized understanding of plan for admission, [facility], discussed case with Dr. Yousuf Vila while patient is awaiting transport at 0800 at time of shift change. Orders Placed This Encounter   Procedures    Culture, Blood 1    Culture, Blood 2    CT CHEST PULMONARY EMBOLISM W CONTRAST    CBC Auto Differential    Comprehensive Metabolic Panel w/ Reflex to MG    Troponin    Lipase    Magnesium    Urinalysis Reflex to Culture    Lactate, Sepsis    Inpatient consult to Hospitalist    Initiate Oxygen Therapy Protocol    EKG 12 Lead     Orders Placed This Encounter   Medications    morphine sulfate (PF) injection 4 mg    ondansetron (ZOFRAN) injection 4 mg    0.9 % sodium chloride bolus    potassium bicarb-citric acid (EFFER-K) effervescent tablet 60 mEq    potassium chloride 10 mEq/100 mL IVPB (Peripheral Line)    iopamidol (ISOVUE-370) 76 % injection 75 mL    morphine sulfate (PF) injection 4 mg    magnesium sulfate 2000 mg in 50 mL IVPB premix    cefTRIAXone (ROCEPHIN) 1,000 mg in sterile water 10 mL IV syringe     Order Specific Question:   Antimicrobial Indications     Answer:   Pneumonia (CAP)    azithromycin (ZITHROMAX) 500 mg in D5W 250ml addavial     Order Specific Question:   Antimicrobial Indications     Answer:   Pneumonia (CAP)    0.9 % sodium chloride bolus     ED Course as of Jun 09 0812   Wed Jun 09, 2021   0711 EKG interpretation by me: At 0537: A. fib with RVR, rate 115, QTc 533, baseline artifact, normal axis, no notable ST elevations or depressions   EKG 12 Lead [SY]      ED Course User Index  [SY] Leann Pedraza DO     The total critical care time spent while evaluating and treating this patient was 35 minutes. This excludes time spent doing separately billable procedures. This includes time at the bedside, data interpretation, medication management, obtaining critical history from collateral sources if the patient is unable to provide it directly, and physician consultation.   Specifics of interventions taken and potentially life-threatening diagnostic considerations are listed in the medical decision making. CLINICAL IMPRESSION  1. Pneumonia of right lower lobe due to infectious organism    2. Rib pain on right side    3. Left arm pain    4. Hypokalemia    5. Hypomagnesemia    6. Atrial fibrillation with rapid ventricular response (HCC)    7. Septicemia ([facility] [address]        Blood pressure (!) 140/98, pulse 111, temperature 98.2 °F (36.8 °C), temperature source Oral, resp. rate 19, height 5' 5\" (1.651 m), weight 158 lb (71.7 kg), SpO2 91 %, not currently breastfeeding. [address] [facility] Final was admitted in stable condition.                    Miguel Bustillo,   06/09/21 0236

## 2021-06-09 NOTE — ED NOTES
Pt states she is unable to void at this time for ordered UA.  MD made aware     Lenny Garza RN  06/09/21 9546

## 2021-06-09 NOTE — H&P
Hospital Medicine History & Physical      PCP: Andra Nunez MD    Date of Admission: 6/9/2021    Date of Service: Pt seen/examined on 6/9/2021     Chief Complaint:    Chief Complaint   Patient presents with    Arm Pain     C/O left elbow, left shoulder pain x 1 week, denies injury       History Of Present Illness: The patient is a [de-identified] y.o. female with a-fib, depression, RLS, hypothyroidism who presents to [facility] with c/o left shoulder and back pain. Ongoing for a week. She saw her PCP. Her pain was thought to be musculoskeletal in nature. She was given steroids. She states the pain continued and she went to the ED for evaluation. They did an X-ray. She was given a muscle relaxer and discharged home. She reports having a fever one night. She has right pleuritic chest pain. It is located to area under right breast and wraps around to her back. The pain is worsened by taking a deep breath, movement, or coughing. She has tried some pain medications, but is unable to tolerate codeine. This caused her some nausea and vomiting. She reports poor PO intake the last few days. She is on 2 L O2. Labs with hypokalemia, hypomagnesemia, hyponatremia, and leukocytosis. CT chest negative for PE and shows Right lower lobe pneumonia, small para-pneumonic effusion and reactive right hilar/mediastinal adenopathy. Admitted to med-surg. Past Medical History:        Diagnosis Date    Atrial fibrillation (HCC)     Depression (disease)     Hypertension     Restless legs syndrome (RLS)     Thyroid disease     Urinary bladder disorder        Past Surgical History:        Procedure Laterality Date    APPENDECTOMY      CYST REMOVAL      HYSTERECTOMY         Medications Prior to Admission:    Prior to Admission medications    Medication Sig Start Date End Date Taking?  Authorizing Provider   umeclidinium-vilanterol (ANORO ELLIPTA) 62.5-25 MCG/INH AEPB inhaler Inhale 1 puff into the lungs daily   Yes Historical Provider, MD   omeprazole (PRILOSEC) 20 MG delayed release capsule Take 20 mg by mouth daily   Yes Historical Provider, MD   fluticasone (ARNUITY ELLIPTA) 100 MCG/ACT AEPB Inhale into the lungs daily   Yes Historical Provider, MD   sotalol (BETAPACE) 80 MG tablet Take 80 mg by mouth 2 times daily   Yes Historical Provider, MD   potassium chloride (KLOR-CON M) 10 MEQ extended release tablet Take 10 mEq by mouth 2 times daily   Yes Historical Provider, MD   hydrochlorothiazide (HYDRODIURIL) 25 MG tablet Take 1 tablet by mouth daily 2/23/17  Yes Abby More MD   melatonin 3 MG TABS tablet Take 3 mg by mouth daily   Yes Historical Provider, MD   rivaroxaban (XARELTO) 10 MG TABS tablet Take 20 mg by mouth    Yes Historical Provider, MD   Levothyroxine Sodium 50 MCG CAPS Take 50 mcg by mouth daily. 11/13/10  Yes Historical Provider, MD   rosuvastatin (CRESTOR) 10 MG tablet Take 10 mg by mouth nightly. 11/13/10  Yes Historical Provider, MD   citalopram (CELEXA) 20 MG tablet Take 10 mg by mouth daily. Yes Historical Provider, MD       Allergies:  Patient has no known allergies. Social History:    TOBACCO:   reports that she quit smoking about 20 years ago. Her smoking use included cigarettes. She has never used smokeless tobacco.  ETOH:   reports no history of alcohol use. Family History:   Positive as follows:    History reviewed. No pertinent family history.     REVIEW OF SYSTEMS:       Constitutional: + fever, fatigue, poor PO intake  Respiratory: + dyspnea, cough, right pleuritic chest pain  Cardiovascular: Negative for chest pain   Gastrointestinal: Negative for diarrhea + nausea, vomiting  Genitourinary: Negative for dysuria  Musculoskeletal: Negative for arthralgias   Skin: Negative for rash   Neurological: Negative for syncope   Psychiatric/Behavorial: Negative for anxiety    PHYSICAL EXAM:    /74   Pulse 84   Temp 97 °F (36.1 °C) (Oral)   Resp 18   Ht 5' 5\" (1.651 m)   Wt 158 lb (71.7 kg)   SpO2 94%   Breastfeeding No   BMI 26.29 kg/m²     Gen: No distress. Alert. Eyes: PERRL. No sclera icterus. No conjunctival injection. ENT: No discharge. Pharynx clear. Neck: No JVD. No Carotid Bruit. Trachea midline. Resp: No accessory muscle use. No crackles. No wheezes. No rhonchi. CV: Regular rate. Regular rhythm. No murmur. No rub. No edema. GI: Non-tender. Non-distended. Normal bowel sounds. No hernia. Skin: Warm and dry. No nodule on exposed extremities. No rash on exposed extremities. M/S: No cyanosis. No joint deformity. No clubbing. Neuro: Awake. Grossly nonfocal    Psych: Oriented x 3. No anxiety or agitation. CBC:   Recent Labs     06/09/21  0555   WBC 17.8*   HGB 14.5   HCT 43.8   MCV 87.7        BMP:   Recent Labs     06/09/21  0555   *   K 2.7*   CL 91*   CO2 24   BUN 15   CREATININE 0.7     LIVER PROFILE:   Recent Labs     06/09/21  0555   AST 12*   ALT 12   LIPASE 7.0*   BILITOT 1.5*   ALKPHOS 97     CARDIAC ENZYMES  Recent Labs     06/09/21  0555   TROPONINI <0.01       U/A:    Lab Results   Component Value Date    COLORU Yellow 12/10/2020    WBCUA 10-20 12/10/2020    RBCUA 3-4 12/10/2020    BACTERIA 2+ 12/10/2020    CLARITYU Clear 12/10/2020    SPECGRAV 1.025 12/10/2020    LEUKOCYTESUR TRACE 12/10/2020    BLOODU TRACE-INTACT 12/10/2020    GLUCOSEU Negative 12/10/2020    GLUCOSEU Negative 11/13/2010       CULTURES  COVID: pending  Sputum: pending  Blood: pending    EKG:  I have reviewed the EKG with the following interpretation:   Atrial fibrillation with rapid ventricular response with premature ventricular or aberrantly conducted complexes, Left axis deviation, Anterolateral infarct (cited on or before 02-JAN-2006)    RADIOLOGY  CT CHEST PULMONARY EMBOLISM W CONTRAST   Final Result   1. Negative for pulmonary embolus. 2. Right lower lobe pneumonia with small parapneumonic effusion and reactive   right hilar/mediastinal adenopathy.            Left humerus 6/7/2021  No acute osseous abnormality. Active Problems:    Pneumonia  Resolved Problems:    * No resolved hospital problems. *        ASSESSMENT/PLAN:  Acute hypoxic respiratory failure  - due to pneumonia  - was requiring 2 L O2  - wean as tolerated. Pneumonia, gram positive organism  - Treat with Rocephin, Zithromax D#1  - Duonebs     Pleuritic chest pain  - Toradol, Percocet prn    Leukocytosis  - due to pneumonia  - IVF's, antibiotics as above. - repeat CBC tomorrow am.     COPD  - stable. No AE  - on Fluticasone, Anoro ellipta. Hyponatremia  - likely due to fluid volume depletion  - monitor BMP  - IVF's. Hypokalemia  Hypomagnesemia   - replaced electrolytes. Improved  - monitor labs    Hypothyroidism  - home dose of synthroid 50 mcg     Paroxysmal A-fib  - rates controlled  - continue Sotalol  - AC: Xarelto    Hypertension  - BP stable. Hold HCTZ due to hyponatremia    GERD  - on PPI. Hyperlipidemia  - on Crestor    Depression  - on Celexa    DVT Prophylaxis: Xarelto  Diet: ADULT DIET;  Regular  Code Status: Full Code    Stanley Peters FNP-C  6/9/2021

## 2021-06-09 NOTE — PROGRESS NOTES
Speech Language Pathology  Facility/Department: [facility] MEDICAL-SURGICAL   CLINICAL BEDSIDE SWALLOW EVALUATION    NAME: Bethany Arciniega  : 1940  MRN: [id number]    ADMISSION DATE: 2021  ADMITTING DIAGNOSIS: has Spinal stenosis of lumbar region; Bilateral sciatica; Primary osteoarthritis of right hip; Greater trochanteric bursitis of right hip; Chest pain; Atrial fibrillation with rapid ventricular response ([address] Hypertension; Restless legs syndrome (RLS); Thyroid disease; Closed displaced fracture of neck of left fifth metacarpal bone; Pain of left hand; Pneumonia; Hypokalemia; Hypomagnesemia; and Rib pain on right side on their problem list.  ONSET DATE: 2021    Recent Chest Xray/CT of Chest: (2021)  1. Negative for pulmonary embolus. 2. Right lower lobe pneumonia with small parapneumonic effusion and reactive   right hilar/mediastinal adenopathy       Date of Eval: 2021  Evaluating Therapist: KATIE Arias    Current Diet level:  Current Diet : Regular  Current Liquid Diet : Thin      Primary Complaint  Patient Complaint: Strained voice; coughing with large sips thin liquids    Pain:  Pain Assessment: Denies      Reason for Referral  Bethany Arciniega was referred for a bedside swallow evaluation to assess the efficiency of her swallow function, identify signs and symptoms of aspiration and make recommendations regarding safe dietary consistencies, effective compensatory strategies, and safe eating environment.     Medical record review/interview: Per MD note, [de-identified] y.o. female presents to the ED with left arm pain, starting about a week ago, atraumatic but thought it may have been related to hanging a bird seed bell when she noticed onset of symptoms, possible muscle spasm/strain per prior ED note, and bilateral/right worse than left sided rib pain starting a couple days ago, also with cough noted around that time as well, brought in by EMS, had left humerus x-rays a couple days ago per PCP on 6/7/21, seen here on the 6/5/21 for back and left arm pain. She has been Covid vaccinated x2 doses back in February, history of A. fib on Xarelto, no fall or bleeding issues recently, no anterior chest pain, she reports the pain is making her short of breath, she had been trying to take the hydrocodone pain medication but was getting nauseous/vomiting, no melena/hematochezia/hematemesis, had not been eating/drinking well because of this, and had not been taking all of her medication including second doses of potassium supplements each day, daughter came in later, no abdominal pain, no headache/neck stiffness, no change in the arm/shoulder blade area pain, no known fevers, no other complaints, modifying factors or associated symptoms\"    Pt has not been evaluated for dysphagia in past, however reports coughing with large sips of thin liquids. Pt reports need for use of puree with whole meds PTA. Pt exhibits strained and strangled vocal quality which pt reports has been present for a few years. Predisposing dysphagia risk factors: COPD and GERD  Clinical signs of possible chronic dysphagia: N/A  Precipitating dysphagia risk factors: increased O2 demands    Vitals/labs:   Temp: 98.2  SpO2: 95  RR: 16  BP: 114/74  HR: 84  WBCs: elevated    Cranial nerve exam:   CN V (trigeminal): ophthalmic, maxillary, and mandibular facial sensation- WNL b/l  CN VII (facial): Impaired R  CN IX/X (glossopharyngeal/vagus): MPT: 14 sec. strained; pitch range: perceptually reduced; vocal quality: Impaired, strained; cough: Productive  CN XII (hypoglossal): Impaired R  These deficits are consistent with UMN dysfunction though no etiology noted in chart which might account for deficits.     Laryngeal function exam:   Secretions: dry mucosa  Vocal quality: strained and strangled  MPT: 14 sec, strained  S/Z ratio: DNT  Pitch range: perceptually reduced  Cough: productive    PO trials:   Ice: Oral swallow appears WNL.  No overt clinical signs of aspiration observed  IDDSI 0 (thin):   - 5cc bolus (tsp): no anterior bolus loss, suspected timely swallow, good oral clearance and no clinical s/s of aspiration  - Cup: no anterior bolus loss, suspected timely swallow and no clinical s/s of aspiration (see below 3 oz water)  - Straw: no anterior bolus loss, good oral clearance and no clinical s/s of aspiration  IDDSI 2 (mildly thick): DNT  IDDSI 3 (moderately thick): DNT  IDDSI 4 (puree): no anterior bolus loss, suspected timely swallow, good oral clearance and no clinical s/s of aspiration  IDDSI 5 (minced and moist): DNT  IDDSI 6 (soft and bite sized): DNT  IDDSI 7 (regular): no anterior bolus loss, oral stasis noted post swallow (trace residue on right lip and tongue), and no clinical s/s of aspiration  3 oz water: FAIL cough after ~1 oz    Clinical signs of pharyngeal dysphagia likely subacute related to hx of dysphagia and/or increased O2 demands. Swallow prognosis is good. Instrumental swallow study is indicated as pt fails 3 oz water challenge. Given good physical mobility and independence in self-feeding and lack of aspiration signs with use of compensatory strateiges, pt is deemed safe for oral diet prior to instrumental study. FEES is preferable assessment given vocal quality changes, however pt prefers less invasive MBS to assess swallow function. Instrumentation: Recommend MBS (per pt preference)  Diet recommendation: IDDSI 7 Regular Solids; IDDSI 0 Thin Liquids; Meds whole in puree  Risk management: upright for all intake, stay upright for at least 30 mins after intake, small bites/sips, oral care 2-3x/day to reduce adverse affects in the event of aspiration, slow rate of intake, general aspiration precautions and hold PO and contact SLP if s/s of aspiration or worsening respiratory status develop. Increase physical mobility as able. Recommend ENT consult as OP for chronic dysphonia.         Impression  Dysphagia Diagnosis: Suspected needs further assessment  Dysphagia Outcome Severity Scale: Level 5: Mild dysphagia- Distant supervision. May need one diet consistency restricted     Treatment Plan  Requires SLP Intervention: Yes  Duration/Frequency of Treatment: 2-4x/wk for LOS  D/C Recommendations: To be determined  Referral To: PT    Recommended Diet and Intervention  Diet Solids Recommendation: Regular  Liquid Consistency Recommendation: Thin  Recommended Form of Meds: Whole with puree  Recommendations: Modified barium swallow study;FEES;Dysphagia treatment (FEES preferable given dysphonia however pt prefers MBS)  Therapeutic Interventions: Diet tolerance monitoring;Patient/Family education    Compensatory Swallowing Strategies  Compensatory Swallowing Strategies: Upright as possible for all oral intake;Remain upright for 30-45 minutes after meals;Small bites/sips;Eat/Feed slowly    Treatment/Goals  Short-term Goals  Timeframe for Short-term Goals: 5 days by 06/14/2021  Goal 1: Pt will participate in instrumental swallowing assessment to assess oropharyngeal swallow function with additional goals to follow as needed. Goal 2: Pt will demonstrate understanding of safe swallow strategies independently. Long-term Goals  Timeframe for Long-term Goals: 7 days by 06/16/2021  Goal 1: Pt will demonstrate functional swallow of preferred consistencies with use of safe swallow strategies in 5/5 opportunities    General  Chart Reviewed: Yes  Comments: Order received for BSE. Chart reviewed. Subjective  Subjective: Pt pleasant and cooperative. Oriented x4. Reports ate regular solids and thin liquids, meds whole in puree PTA. Pt reports coughing with large sips. Strained vocal quality noted for several years  Behavior/Cognition: Alert; Cooperative;Pleasant mood  Temperature Spikes Noted: No  Respiratory Status: O2 via nasual cannula  O2 Device: Nasal cannula  Liters of Oxygen: 2 L  Communication Observation: Functional (dysphonic--strained and strangled quality)  Follows Directions: Simple  Dentition: Some missing teeth (does not eat with dentures)  Patient Positioning: Upright in bed  Baseline Vocal Quality: Dysphonic  Volitional Cough: Strong  Prior Dysphagia History: Pt has not been evaluated for dysphagia in past, however reports coughing with large sips of thin liquids  Consistencies Administered: Reg solid; Dysphagia Pureed (Dysphagia I); Ice Chips; Thin - teaspoon; Thin - cup; Thin - straw           Vision/Hearing  Hearing  Hearing: Exceptions to [facility]  Hearing Exceptions: Hard of hearing/hearing concerns    Oral Motor Deficits  Oral/Motor  Oral Motor: Exceptions to [facility]  Labial Symmetry: Abnormal symmetry right  Lingual Symmetry: Abnormal symmetry right    Oral Phase Dysfunction  Oral Phase  Oral Phase: WFL     Indicators of Pharyngeal Phase Dysfunction   Pharyngeal Phase   Pharyngeal: Suspected Pharyngeal Dysphagia as pt coughs during attempt at 3 oz water challenge    Prognosis  Prognosis  Prognosis for safe diet advancement: good  Individuals consulted  Consulted and agree with results and recommendations: Patient;RN    Education  Patient Education: Education completed with pt/RN re: results and recommendations, role of SLP in dysphagia, signs of aspiration, risks of aspiration, and benefits of oral care and self-feeding    Patient Education Response: Verbalizes understanding;Demonstrated understanding  Safety Devices in place: Yes  Type of devices: All fall risk precautions in place; Bed alarm in place;Call light within reach;Nurse notified; Left in bed       Therapy Time  SLP Individual Minutes  Time In: 9438  Time Out: [facility]  Minutes: [address] Kaylee Menchaca M.A. CCC-SLP  Speech-Language Pathologist      KATIE Stiles  6/9/2021 2:43 PM

## 2021-06-09 NOTE — CARE COORDINATION
Advance Care Planning   Healthcare Decision Maker:    Primary Decision Maker: Christal Fernando - Child - [phone]      Today we documented Decision Maker(s) consistent with Legal Next of Kin hierarchy. Pt reports that she does have LW/DPOA but does not have copy with her here. Pt will have family bring copy to be placed in Epic.

## 2021-06-09 NOTE — ED NOTES
IV Zithromax infusion continued per order and without s/s infiltration or adverse reactions paula bhatt from [address] [place] [address], RN  06/09/21 0170

## 2021-06-09 NOTE — PROGRESS NOTES
RESPIRATORY THERAPY ASSESSMENT    Name:  Jannette Kaplan  Medical Record Number:  [id number]  Age: [de-identified] y.o. Gender: female  : 1940  Today's Date:  2021  Room:  [address] [place], [place] [address]    Assessment     Is the patient being admitted for a COPD or Asthma exacerbation? No   (If yes the patient will be seen every 4 hours for the first 24 hours and then reassessed)    Patient Admission Diagnosis      Allergies  No Known Allergies    Minimum Predicted Vital Capacity:     na          Actual Vital Capacity:      na              Pulmonary History:COPD  Home Oxygen Therapy:  room air  Home Respiratory Therapy:Fluticasone Furoate  and Umeclidinium Bromide/Vilanterol   Current Respiratory Therapy:  Duoneb Q4WA  Treatment Type: HHN  Medications: Albuterol/Ipratropium    Respiratory Severity Index(RSI)   Patients with orders for inhalation medications, oxygen, or any therapeutic treatment modality will be placed on Respiratory Protocol. They will be assessed with the first treatment and at least every 72 hours thereafter. The following severity scale will be used to determine frequency of treatment intervention.     Smoking History: Pulmonary Disease or Smoking History, Greater than 15 pack year = 2    Social History  Social History     Tobacco Use    Smoking status: Former Smoker     Types: Cigarettes     Quit date: 2000     Years since quittin.5    Smokeless tobacco: Never Used   Substance Use Topics    Alcohol use: No    Drug use: No       Recent Surgical History: None = 0  Past Surgical History  Past Surgical History:   Procedure Laterality Date    APPENDECTOMY      CYST REMOVAL      HYSTERECTOMY         Level of Consciousness: Alert, Oriented, and Cooperative = 0    Level of Activity: Walking with assistance = 1    Respiratory Pattern: Regular Pattern; RR 8-20 = 0    Breath Sounds: Diminshed bilaterally and/or crackles = 2    Sputum   ,  ,    Cough: Weak, productive = 2    Vital Signs   /74 Pulse 84   Temp 97 °F (36.1 °C) (Oral)   Resp 18   Ht 5' 5\" (1.651 m)   Wt 158 lb (71.7 kg)   SpO2 92%   Breastfeeding No   BMI 26.29 kg/m²   SPO2 (COPD values may differ): 90-91% on room air or greater than 92% on FiO2 24- 28% = 1    Peak Flow (asthma only): not applicable = 0    RSI: 7-8 = BID and Q4HPRN (every four hours as needed) for dyspnea        Plan       Goals: medication delivery, mobilize retained secretions, volume expansion and improve oxygenation    Patient/caregiver was educated on the proper method of use for Respiratory Care Devices:  Yes      Level of patient/caregiver understanding able to:   ? Verbalize understanding   ? Demonstrate understanding       ? Teach back        ? Needs reinforcement       ? No available caregiver               ? Other:     Response to education:  Excellent     Is patient being placed on Home Treatment Regimen? No     Does the patient have everything they need prior to discharge? NA     Comments: Patient assessed, chart reviewed. Plan of Care: Duoneb BID and Q4 PRN per RSI. Electronically signed by Bishop Tyrese RCP on 6/9/2021 at 12:21 PM    Respiratory Protocol Guidelines     1. Assessment and treatment by Respiratory Therapy will be initiated for medication and therapeutic interventions upon initiation of aerosolized medication. 2. Physician will be contacted for respiratory rate (RR) greater than 35 breaths per minute. Therapy will be held for heart rate (HR) greater than 140 beats per minute, pending direction from physician. 3. Bronchodilators will be administered via Metered Dose Inhaler (MDI) with spacer when the following criteria are met:  a. Alert and cooperative     b. HR < 140 bpm  c. RR < 30 bpm                d. Can demonstrate a 2-3 second inspiratory hold  4. Bronchodilators will be administered via Hand Held Nebulizer BHUMIKA [facility]) to patients when ANY of the following criteria are met  a.  Incognizant or uncooperative b. Patients treated with HHN at Home        c. Unable to demonstrate proper use of MDI with spacer     d. RR > 30 bpm   5. Bronchodilators will be delivered via Metered Dose Inhaler (MDI), HHN, Aerogen to intubated patients on mechanical ventilation. 6. Inhalation medication orders will be delivered and/or substituted as outlined below. Aerosolized Medications Ordering and Administration Guidelines:    1. All Medications will be ordered by a physician, and their frequency and/or modality will be adjusted as defined by the patients Respiratory Severity Index (RSI) score. 2. If the patient does not have documented COPD, consider discontinuing anticholinergics when RSI is less than 9.  3. If the bronchospasm worsens (increased RSI), then the bronchodilator frequency can be increased to a maximum of every 4 hours. If greater than every 4 hours is required, the physician will be contacted. 4. If the bronchospasm improves, the frequency of the bronchodilator can be decreased, based on the patient's RSI, but not less than home treatment regimen frequency. 5. Bronchodilator(s) will be discontinued if patient has a RSI less than 9 and has received no scheduled or as needed treatment for 72  Hrs. Patients Ordered on a Mucolytic Agent:    1. Must always be administered with a bronchodilator. 2. Discontinue if patient experiences worsened bronchospasm, or secretions have lessened to the point that the patient is able to clear them with a cough. Anti-inflammatory and Combination Medications:    1. If the patient lacks prior history of lung disease, is not using inhaled anti-inflammatory medication at home, and lacks wheezing by examination or by history for at least 24 hours, contact physician for possible discontinuation.

## 2021-06-10 ENCOUNTER — APPOINTMENT (OUTPATIENT)
Dept: GENERAL RADIOLOGY | Age: 81
DRG: 177 | End: 2021-06-10
Payer: MEDICARE

## 2021-06-10 PROBLEM — M79.602 LEFT ARM PAIN: Status: ACTIVE | Noted: 2020-12-17

## 2021-06-10 LAB
ANION GAP SERPL CALCULATED.3IONS-SCNC: 5 MMOL/L (ref 3–16)
BASOPHILS ABSOLUTE: 0 K/UL (ref 0–0.2)
BASOPHILS RELATIVE PERCENT: 0 %
BUN BLDV-MCNC: 16 MG/DL (ref 7–20)
CALCIUM SERPL-MCNC: 8.2 MG/DL (ref 8.3–10.6)
CHLORIDE BLD-SCNC: 99 MMOL/L (ref 99–110)
CO2: 29 MMOL/L (ref 21–32)
CREAT SERPL-MCNC: 0.7 MG/DL (ref 0.6–1.2)
EOSINOPHILS ABSOLUTE: 0.1 K/UL (ref 0–0.6)
EOSINOPHILS RELATIVE PERCENT: 1 %
GFR AFRICAN AMERICAN: >60
GFR NON-AFRICAN AMERICAN: >60
GLUCOSE BLD-MCNC: 121 MG/DL (ref 70–99)
HCT VFR BLD CALC: 37.6 % (ref 36–48)
HEMOGLOBIN: 12.8 G/DL (ref 12–16)
LYMPHOCYTES ABSOLUTE: 1.4 K/UL (ref 1–5.1)
LYMPHOCYTES RELATIVE PERCENT: 11 %
MAGNESIUM: 2.3 MG/DL (ref 1.8–2.4)
MCH RBC QN AUTO: 30.3 PG (ref 26–34)
MCHC RBC AUTO-ENTMCNC: 34 G/DL (ref 31–36)
MCV RBC AUTO: 89.2 FL (ref 80–100)
MONOCYTES ABSOLUTE: 0.5 K/UL (ref 0–1.3)
MONOCYTES RELATIVE PERCENT: 4 %
NEUTROPHILS ABSOLUTE: 10.8 K/UL (ref 1.7–7.7)
NEUTROPHILS RELATIVE PERCENT: 84 %
PDW BLD-RTO: 16.3 % (ref 12.4–15.4)
PLATELET # BLD: 167 K/UL (ref 135–450)
PLATELET SLIDE REVIEW: ADEQUATE
PMV BLD AUTO: 9 FL (ref 5–10.5)
POTASSIUM REFLEX MAGNESIUM: 3.6 MMOL/L (ref 3.5–5.1)
RBC # BLD: 4.21 M/UL (ref 4–5.2)
SLIDE REVIEW: ABNORMAL
SODIUM BLD-SCNC: 133 MMOL/L (ref 136–145)
URINE CULTURE, ROUTINE: NORMAL
WBC # BLD: 12.8 K/UL (ref 4–11)

## 2021-06-10 PROCEDURE — 85025 COMPLETE CBC W/AUTO DIFF WBC: CPT

## 2021-06-10 PROCEDURE — 99232 SBSQ HOSP IP/OBS MODERATE 35: CPT | Performed by: INTERNAL MEDICINE

## 2021-06-10 PROCEDURE — 36415 COLL VENOUS BLD VENIPUNCTURE: CPT

## 2021-06-10 PROCEDURE — 97530 THERAPEUTIC ACTIVITIES: CPT

## 2021-06-10 PROCEDURE — 6370000000 HC RX 637 (ALT 250 FOR IP): Performed by: INTERNAL MEDICINE

## 2021-06-10 PROCEDURE — 92526 ORAL FUNCTION THERAPY: CPT

## 2021-06-10 PROCEDURE — 92611 MOTION FLUOROSCOPY/SWALLOW: CPT

## 2021-06-10 PROCEDURE — 2580000003 HC RX 258: Performed by: PHYSICIAN ASSISTANT

## 2021-06-10 PROCEDURE — 94761 N-INVAS EAR/PLS OXIMETRY MLT: CPT

## 2021-06-10 PROCEDURE — 80048 BASIC METABOLIC PNL TOTAL CA: CPT

## 2021-06-10 PROCEDURE — 94640 AIRWAY INHALATION TREATMENT: CPT

## 2021-06-10 PROCEDURE — 1200000000 HC SEMI PRIVATE

## 2021-06-10 PROCEDURE — 83735 ASSAY OF MAGNESIUM: CPT

## 2021-06-10 PROCEDURE — 74230 X-RAY XM SWLNG FUNCJ C+: CPT

## 2021-06-10 PROCEDURE — 6370000000 HC RX 637 (ALT 250 FOR IP): Performed by: PHYSICIAN ASSISTANT

## 2021-06-10 PROCEDURE — 2700000000 HC OXYGEN THERAPY PER DAY

## 2021-06-10 PROCEDURE — 97165 OT EVAL LOW COMPLEX 30 MIN: CPT

## 2021-06-10 PROCEDURE — 6370000000 HC RX 637 (ALT 250 FOR IP): Performed by: NURSE PRACTITIONER

## 2021-06-10 PROCEDURE — 6360000002 HC RX W HCPCS: Performed by: PHYSICIAN ASSISTANT

## 2021-06-10 RX ORDER — LIDOCAINE 4 G/G
1 PATCH TOPICAL DAILY
Status: DISCONTINUED | OUTPATIENT
Start: 2021-06-10 | End: 2021-06-16 | Stop reason: HOSPADM

## 2021-06-10 RX ORDER — OXYCODONE HYDROCHLORIDE AND ACETAMINOPHEN 5; 325 MG/1; MG/1
1 TABLET ORAL EVERY 8 HOURS PRN
Status: DISCONTINUED | OUTPATIENT
Start: 2021-06-10 | End: 2021-06-12

## 2021-06-10 RX ORDER — IPRATROPIUM BROMIDE AND ALBUTEROL SULFATE 2.5; .5 MG/3ML; MG/3ML
1 SOLUTION RESPIRATORY (INHALATION) 3 TIMES DAILY
Status: DISCONTINUED | OUTPATIENT
Start: 2021-06-10 | End: 2021-06-16 | Stop reason: HOSPADM

## 2021-06-10 RX ORDER — METHOCARBAMOL 500 MG/1
500 TABLET, FILM COATED ORAL 3 TIMES DAILY
Status: DISCONTINUED | OUTPATIENT
Start: 2021-06-10 | End: 2021-06-11

## 2021-06-10 RX ADMIN — CEFTRIAXONE SODIUM 1000 MG: 1 INJECTION, POWDER, FOR SOLUTION INTRAMUSCULAR; INTRAVENOUS at 09:08

## 2021-06-10 RX ADMIN — Medication 10 ML: at 20:28

## 2021-06-10 RX ADMIN — IPRATROPIUM BROMIDE AND ALBUTEROL SULFATE 1 AMPULE: .5; 3 SOLUTION RESPIRATORY (INHALATION) at 07:35

## 2021-06-10 RX ADMIN — IPRATROPIUM BROMIDE AND ALBUTEROL SULFATE 1 AMPULE: .5; 3 SOLUTION RESPIRATORY (INHALATION) at 20:16

## 2021-06-10 RX ADMIN — AZITHROMYCIN 500 MG: 250 TABLET, FILM COATED ORAL at 08:25

## 2021-06-10 RX ADMIN — POTASSIUM CHLORIDE 10 MEQ: 750 TABLET, EXTENDED RELEASE ORAL at 20:24

## 2021-06-10 RX ADMIN — PANTOPRAZOLE SODIUM 40 MG: 40 TABLET, DELAYED RELEASE ORAL at 05:35

## 2021-06-10 RX ADMIN — POTASSIUM CHLORIDE 10 MEQ: 750 TABLET, EXTENDED RELEASE ORAL at 08:25

## 2021-06-10 RX ADMIN — SOTALOL HYDROCHLORIDE 80 MG: 80 TABLET ORAL at 20:23

## 2021-06-10 RX ADMIN — LEVOTHYROXINE SODIUM 50 MCG: 50 TABLET ORAL at 05:35

## 2021-06-10 RX ADMIN — ROSUVASTATIN CALCIUM 10 MG: 10 TABLET, FILM COATED ORAL at 20:23

## 2021-06-10 RX ADMIN — METHOCARBAMOL TABLETS 500 MG: 500 TABLET, COATED ORAL at 20:23

## 2021-06-10 RX ADMIN — METHOCARBAMOL TABLETS 500 MG: 500 TABLET, COATED ORAL at 14:06

## 2021-06-10 RX ADMIN — OXYCODONE HYDROCHLORIDE AND ACETAMINOPHEN 1 TABLET: 5; 325 TABLET ORAL at 17:42

## 2021-06-10 RX ADMIN — IPRATROPIUM BROMIDE AND ALBUTEROL SULFATE 1 AMPULE: .5; 3 SOLUTION RESPIRATORY (INHALATION) at 11:19

## 2021-06-10 RX ADMIN — CITALOPRAM HYDROBROMIDE 10 MG: 20 TABLET ORAL at 08:24

## 2021-06-10 RX ADMIN — SOTALOL HYDROCHLORIDE 80 MG: 80 TABLET ORAL at 08:24

## 2021-06-10 RX ADMIN — OXYCODONE HYDROCHLORIDE AND ACETAMINOPHEN 1 TABLET: 5; 325 TABLET ORAL at 09:05

## 2021-06-10 RX ADMIN — RIVAROXABAN 20 MG: 20 TABLET, FILM COATED ORAL at 17:42

## 2021-06-10 ASSESSMENT — PAIN DESCRIPTION - DESCRIPTORS
DESCRIPTORS: SHOOTING

## 2021-06-10 ASSESSMENT — PAIN - FUNCTIONAL ASSESSMENT
PAIN_FUNCTIONAL_ASSESSMENT: ACTIVITIES ARE NOT PREVENTED

## 2021-06-10 ASSESSMENT — PAIN DESCRIPTION - PAIN TYPE
TYPE: ACUTE PAIN

## 2021-06-10 ASSESSMENT — PAIN SCALES - GENERAL
PAINLEVEL_OUTOF10: 0
PAINLEVEL_OUTOF10: 9
PAINLEVEL_OUTOF10: 0
PAINLEVEL_OUTOF10: 0
PAINLEVEL_OUTOF10: 7
PAINLEVEL_OUTOF10: 6
PAINLEVEL_OUTOF10: 8
PAINLEVEL_OUTOF10: 0
PAINLEVEL_OUTOF10: 6
PAINLEVEL_OUTOF10: 0
PAINLEVEL_OUTOF10: 6

## 2021-06-10 ASSESSMENT — PAIN DESCRIPTION - PROGRESSION
CLINICAL_PROGRESSION: NOT CHANGED

## 2021-06-10 ASSESSMENT — PAIN DESCRIPTION - FREQUENCY
FREQUENCY: CONTINUOUS

## 2021-06-10 ASSESSMENT — PAIN DESCRIPTION - LOCATION
LOCATION: ARM

## 2021-06-10 ASSESSMENT — PAIN DESCRIPTION - ONSET
ONSET: ON-GOING

## 2021-06-10 ASSESSMENT — PAIN DESCRIPTION - ORIENTATION
ORIENTATION: LEFT

## 2021-06-10 NOTE — PLAN OF CARE
Problem: Falls - Risk of:  Goal: Will remain free from falls  Description: Will remain free from falls  Outcome: Ongoing  Goal: Absence of physical injury  Description: Absence of physical injury  Outcome: Ongoing     Problem: Nutrition  Intervention: Swallowing evaluation  6/9/2021 1512 by KATIE Hill  Note: Education completed with pt/RN re: results and recommendations, role of SLP in dysphagia, signs of aspiration, risks of aspiration, and benefits of oral care and self-feeding    Intervention: Aspiration precautions  6/9/2021 1512 by KATIE Hill  Note: Education completed with pt/RN re: results and recommendations, role of SLP in dysphagia, signs of aspiration, risks of aspiration, and benefits of oral care and self-feeding

## 2021-06-10 NOTE — PROGRESS NOTES
Inpatient Occupational Therapy  Evaluation and Treatment    Unit: [address] [place]  Date:  6/10/2021  Patient Name:    Dianelys Alicea  Admitting diagnosis:  Pneumonia [J18.9]  Admit Date:  6/9/2021  Precautions/Restrictions/WB Status/ Lines/ Wounds/ Oxygen: Fall risk, Bed/chair alarm, Lines -IV and Supplemental O2 (2L) and Telemetry    Treatment Time:  8687-4926  Treatment Number: 1   Timed code treatment minutes 10 minutes   Total Treatment minutes:   20   minutes    Patient Goals for Therapy:  \" not be in so much pain \"      Discharge Recommendations: SNF - continue to assess  DME needs for discharge: defer to facility       Therapy recommendations for staff:   Assist of 2 with use of LUIS FELIPE STEDY and gait belt for all transfers to/from [facility]  to/from [facility]    History of Present Illness: per MERLENE Cooper NP 6/9/21:  \"The patient is a [de-identified] y.o. female with a-fib, depression, RLS, hypothyroidism who presents to [facility] with c/o left shoulder and back pain. Ongoing for a week. She saw her PCP. Her pain was thought to be musculoskeletal in nature. She was given steroids. She states the pain continued and she went to the ED for evaluation. They did an X-ray. She was given a muscle relaxer and discharged home. She reports having a fever one night. She has right pleuritic chest pain. It is located to area under right breast and wraps around to her back. The pain is worsened by taking a deep breath, movement, or coughing. She has tried some pain medications, but is unable to tolerate codeine. This caused her some nausea and vomiting. She reports poor PO intake the last few days.       She is on 2 L O2. Labs with hypokalemia, hypomagnesemia, hyponatremia, and leukocytosis. CT chest negative for PE and shows Right lower lobe pneumonia, small para-pneumonic effusion and reactive right hilar/mediastinal adenopathy. Admitted to med-surg. \"    Home Health S4 Level Recommendation:  Level 3 Safety if going home  AM-PAC and all needs within reach    Exercise / Activities Initiated: NA  N/A    Patient/Family Education:   Role of OT  Oxygen tubing management    Assessment of Deficits: Pt seen for Occupational therapy evaluation in acute care setting. Pt demonstrated decreased Activity tolerance, ADLs, IADLs, Safety Awareness, Strength and Transfers. Pt functioning below baseline and will likely benefit from skilled occupational therapy services to maximize safety and independence. Goal(s) : To be met in 3 Visits:  1). Bed to toilet/BSC: SBA    To be met in 5 Visits:  1). Supine to/from Sit:  Supervision  2). Upper Body Bathing:   Supervision  3). Lower Body Bathing:   CGA  4). Upper Body Dressing:  Supervision  5). Lower Body Dressing:  CGA  6). Pt to demonstrate UE exs x 15 reps with minimal cues    Rehabilitation Potential:  Good for goals listed above. Strengths for achieving goals include: PLOF  Barriers to achieving goals include:  Pain     Plan: To be seen 3-5 x/wk while in acute care setting for therapeutic exercises, bed mobility, transfers, dressing, bathing, family/patient education, ADL/IADL retraining, energy conservation training.      Gloria Esparza, OTR/L 9020            If patient discharges from this facility prior to next visit, this note will serve as the Discharge Summary

## 2021-06-10 NOTE — PROGRESS NOTES
RESPIRATORY THERAPY ASSESSMENT    Name:  Cathi Loredo  Medical Record Number:  [id number]  Age: [de-identified] y.o. Gender: female  : 1940  Today's Date:  6/10/2021  Room:  [address] [place] [address]    Assessment     Is the patient being admitted for a COPD or Asthma exacerbation? No   (If yes the patient will be seen every 4 hours for the first 24 hours and then reassessed)    Patient Admission Diagnosis      Allergies  No Known Allergies    Minimum Predicted Vital Capacity:               Actual Vital Capacity:                    Pulmonary History:COPD  Home Oxygen Therapy:  room air  Home Respiratory Therapy:Fluticasone Furoate  and Umeclidinium Bromide/Vilanterol   Current Respiratory Therapy:  Duoneb 4x daily  Treatment Type: HHN  Medications: Albuterol/Ipratropium    Respiratory Severity Index(RSI)   Patients with orders for inhalation medications, oxygen, or any therapeutic treatment modality will be placed on Respiratory Protocol. They will be assessed with the first treatment and at least every 72 hours thereafter. The following severity scale will be used to determine frequency of treatment intervention.     Smoking History: Pulmonary Disease or Smoking History, Greater than 15 pack year = 2    Social History  Social History     Tobacco Use    Smoking status: Former Smoker     Types: Cigarettes     Quit date: 2000     Years since quittin.5    Smokeless tobacco: Never Used   Substance Use Topics    Alcohol use: No    Drug use: No       Recent Surgical History: None = 0  Past Surgical History  Past Surgical History:   Procedure Laterality Date    APPENDECTOMY      CYST REMOVAL      HYSTERECTOMY         Level of Consciousness: Alert, Oriented, and Cooperative = 0    Level of Activity: Walking with assistance = 1    Respiratory Pattern: Regular Pattern; RR 8-20 = 0    Breath Sounds: Diminshed bilaterally and/or crackles = 2    Sputum   ,  , Sputum How Obtained: Spontaneous cough  Cough: Strong, spontaneous, non-productive = 0    Vital Signs   /85   Pulse 87   Temp 97.2 °F (36.2 °C) (Oral)   Resp 18   Ht 5' 5\" (1.651 m)   Wt 158 lb (71.7 kg)   SpO2 92%   Breastfeeding No   BMI 26.29 kg/m²   SPO2 (COPD values may differ): 88-89% on room air or greater than 92% on FiO2 28- 35% = 2    Peak Flow (asthma only): not applicable = 0    RSI: 7-8 = BID and Q4HPRN (every four hours as needed) for dyspnea        Plan       Goals: medication delivery    Patient/caregiver was educated on the proper method of use for Respiratory Care Devices:  Yes      Level of patient/caregiver understanding able to:   ? Verbalize understanding   ? Demonstrate understanding       ? Teach back        ? Needs reinforcement       ? No available caregiver               ? Other:     Response to education:  Excellent     Is patient being placed on Home Treatment Regimen? No     Does the patient have everything they need prior to discharge? Yes     Comments: patient assessed. Chart reviewed    Plan of Care: Duoneb TID and Q4PRN    Electronically signed by Deisi Coto RCP on 6/10/2021 at 11:23 AM    Respiratory Protocol Guidelines     1. Assessment and treatment by Respiratory Therapy will be initiated for medication and therapeutic interventions upon initiation of aerosolized medication. 2. Physician will be contacted for respiratory rate (RR) greater than 35 breaths per minute. Therapy will be held for heart rate (HR) greater than 140 beats per minute, pending direction from physician. 3. Bronchodilators will be administered via Metered Dose Inhaler (MDI) with spacer when the following criteria are met:  a. Alert and cooperative     b. HR < 140 bpm  c. RR < 30 bpm                d. Can demonstrate a 2-3 second inspiratory hold  4. Bronchodilators will be administered via Hand Held Nebulizer BHUMIKA [facility]) to patients when ANY of the following criteria are met  a. Incognizant or uncooperative          b.  Patients treated with HHN at

## 2021-06-10 NOTE — PROGRESS NOTES
Patient moaning in pain when writer was rounding. Able to educate patient to agree to take pain medication in order to get rest. Percocet given per [facility]

## 2021-06-10 NOTE — PROGRESS NOTES
Attempted to walk patient to bathroom, contact assist unable to walk to bathroom per patient. Bedside commode at bedside.

## 2021-06-10 NOTE — PROCEDURES
INSTRUMENTAL SWALLOW REPORT  MODIFIED BARIUM SWALLOW    NAME: Bethany Arciniega   : 1940  MRN: [id number]       Date of Eval: 6/10/2021     Ordering Physician: TERRI Shi-CNP  Radiologist: Dr. Pernell Juan     Referring Diagnosis(es): Referring Diagnosis: Dysphagia    Past Medical History:  has a past medical history of Atrial fibrillation ([address] Depression (disease), Hypertension, Restless legs syndrome (RLS), Thyroid disease, and Urinary bladder disorder. Past Surgical History:  has a past surgical history that includes Appendectomy; Hysterectomy; and cyst removal.    Current Diet Solid Consistency: Regular  Current Diet Liquid Consistency: Thin    Date of Prior Study: N/A  Type of Study: Initial MBS  Results of Prior Study: N/A    Recent CXR/CT of Chest: 2021    Impression   1. Negative for pulmonary embolus. 2. Right lower lobe pneumonia with small parapneumonic effusion and reactive   right hilar/mediastinal adenopathy. Onset of problem:   Date of Onset: 2021    General Comment  Comments: Chart reviewed prior to completion of assessment  Subjective  Subjective: Patient seen in fluoro suite. Pt was alert and cooperative, is ill appearing    Behavior/Cognition/Vision/Hearing:  Behavior/Cognition: Alert; Cooperative;Pleasant mood  Vision: Within Functional Limits  Hearing: Within functional limits    Patient Complaints/Reason for Referral:  Bethany Arciniega was referred for a MBS to assess the efficiency of his/her swallow function, assess for aspiration, and to make recommendations regarding safe dietary consistencies, effective compensatory strategies, and safe eating environment.   Patient complaints:  Medical record review/interview: Per MD note, \"80 y.o. female presents to the ED with left arm pain, starting about a week ago, atraumatic but thought it may have been related to hanging a bird seed bell when she noticed onset of symptoms, possible muscle spasm/strain per prior ED note, and bilateral/right worse than left sided rib pain starting a couple days ago, also with cough noted around that time as well, brought in by EMS, had left humerus x-rays a couple days ago per PCP on 6/7/21, seen here on the 6/5/21 for back and left arm pain.  She has been Covid vaccinated x2 doses back in February, history of A. fib on Xarelto, no fall or bleeding issues recently, no anterior chest pain, she reports the pain is making her short of breath, she had been trying to take the hydrocodone pain medication but was getting nauseous/vomiting, no melena/hematochezia/hematemesis, had not been eating/drinking well because of this, and had not been taking all of her medication including second doses of potassium supplements each day, daughter came in later, no abdominal pain, no headache/neck stiffness, no change in the arm/shoulder blade area pain, no known fevers, no other complaints, modifying factors or associated symptoms\"     Pt has not been evaluated for dysphagia in past, however reports coughing with large sips of thin liquids. Pt reports need for use of puree with whole meds PTA. Pt exhibits strained and strangled vocal quality which pt reports has been present for a few years. Predisposing dysphagia risk factors: COPD and GERD  Clinical signs of possible chronic dysphagia: N/A  Precipitating dysphagia risk factors: increased O2 demands    MBS Trials and Findings:   Oral phase characterized by good AP bolus transit. Pt noted with reduced oral sensation and awareness resulting in premature spillage to valleculae and pyriform sinus. Pt noted with prolonged, grossly functional mastication with soft and regular solids. Pharyngeal phase characterized by impaired anterior laryngeal movement resulting in trace residue at UES, with normal pharyngeal peristalsis, functional epiglottic retraction and retroflection, adequate duration and extent of UES opening.  Observed one instance of flash penetration, however, this is likely due to premature spillage noted in the oral phase vs pharyngeal phases deficits. Did note prominent cricopharyngeus resulting in x2 retrograde bolus movement into the pharynx. No aspiration with any consistencies administered. Impressions/Assessment: mild oropharyngeal dysphagia, likely acute-on-chronic related to reduced physical mobility, increased O2 demands, respiratory illness, fatigue, generalized weakness, impaired respiratory-swallow coordination and age related deficits judged to be grossly normal. Swallow safety is preserved; swallow efficiency is preserved. Pt appears to be at low risk for aspiration PNA, and low risk for malnutrition/dehydration. Diet modification/non-oral nutrition is not indicated. Swallow prognosis is good given lack of aspiration noted on instrumental assessment. Patient appears to be a good candidate for behavioral swallow rehabilitation. Diet Recommendation: IDDSI 7 Regular Solids ; IDDSI 0 Thin Liquids; Meds whole with thin liquids  Risk Management: upright for all intake, small bites/sips, oral care 2-3x/day to reduce adverse affects in the event of aspiration, increase physical mobility as able, slow rate of intake, general aspiration precautions and hold PO and contact SLP if s/s of aspiration or worsening respiratory status develop. Specialist Referrals: Pulmonology  Ancillary Tests: N/A  Goals: Tolerate LRD  Follow-up exam: F/U in tx session    Treatment Dx and ICD 10:    Patient Position: Lateral and Patient Degrees: Patient was seated upright in wheelchair at 90 degrees in right lateral position     Consistencies Administered: Thin cup; Thin teaspoon; Thin straw;Dysphagia Pureed (Dysphagia I); Dysphagia Soft and Bite-Sized (Dysphagia III); Reg solid    Dysphagia Outcome Severity Scale: Level 6: Within functional limits/Modified independence  Penetration-Aspiration Scale (PAS): 2 - Material enters the airway, remains above the vocal folds, and is ejected from the airway    Recommended Diet:  Solid consistency: Regular  Liquid consistency: Thin  Liquid administration via: Cup;Straw    Medication administration: PO    Safe Swallow Protocol:  Supervision: Independent  Compensatory Swallowing Strategies: Eat/Feed slowly;Upright as possible for all oral intake;Small bites/sips    Recommendations/Treatment  Requires SLP Intervention: Yes     D/C Recommendations: To be determined  Postural Changes and/or Swallow Maneuvers: Upright 90 degrees    Recommended Exercises:    Therapeutic Interventions: Diet tolerance monitoring;Oral care; Patient/Family education    Referral To: Pulmonology    Education: Images and recommendations were reviewed with the patient following this exam.   Patient Education: SLP educated the patient re: Role of SLP, rationale for completion of assessment, anatomical components of swallow structures as they pertain to airway protection, results of assessment, recommendations and POC  Patient Education Response: Verbalizes understanding    Prognosis  Prognosis for safe diet advancement: good  Barriers to reach goals: fatigue  Duration/Frequency of Treatment  Duration/Frequency of Treatment: 2-5x/wk  Safety Devices  Safety Devices in place: Not Applicable    Goals:    Short-term Goals  Timeframe for Short-term Goals: 5 days by 06/14/2021  Goal 1: The patient will tolerate recommended diet with no clinical s/s of aspiration 5/5. Goal 2: Pt will demonstrate understanding of safe swallow strategies independently.     Long-term Goals  Timeframe for Long-term Goals: 7 days by 06/16/2021  Goal 1: Pt will demonstrate functional swallow of preferred consistencies with use of safe swallow strategies in 5/5 opportunities    Oral Preparation / Oral Phase  Oral Phase: Impaired    Pharyngeal Phase  Pharyngeal Phase: Impaired    Esophageal Phase  Esophageal Screen: WFL    Pain   Patient Currently in Pain: Yes  Pain Level: 0  Pain Type: Acute pain  Pain Location: Arm    Therapy Time:   Individual   Time In2 1200   Time Out 1232   Minutes [facility]  Clinician  Bj Hoffman, 6/10/2021, 2:57 PM     Cat Santos M.A., [address] [facility] #[id number]  Speech-Language Pathologist  Phone: [phone]

## 2021-06-10 NOTE — PROGRESS NOTES
Occupational/Physical Therapy  Attempted to see patient this am, leaving floor for testing. Reattempted pm and patient declined states she is too fatigued and in pain to participate. RN provided medications prior to second attempt. Patient requesting to rest this afternoon and attempt evaluations tomorrow. Will reattempt 6/11/21.   Malka Lares, OTR/L 0694  Juvencio Samson, MS PT, # [id number]

## 2021-06-10 NOTE — DISCHARGE INSTR - COC
Continuity of Care Form    Patient Name: Bill Mahan   :  1940  MRN:  [id number]    Admit date:  2021  Discharge date:  2021    Code Status Order: Full Code   Advance Directives:   Advance Care Flowsheet Documentation       Date/Time Healthcare Directive Type of Healthcare Directive Copy in [address] Agent's Name Healthcare Agent's Phone Number    21 1324  Yes, patient has an advance directive for healthcare treatment  Living will  No, copy requested from medical records  --  --  --            Admitting Physician:  Josef Núñez MD  PCP: Mary Kebede MD    Discharging Nurse: [facility] Unit/Room#: 0214/0214-01  Discharging Unit Phone Number: ***    Emergency Contact:   Extended Emergency Contact Information  Primary Emergency Contact: [address] [place] [address], [address] Phone: [phone]  Relation: Child  Secondary Emergency Contact: [place] [address]  Mobile Phone: [phone]  Relation: Child    Past Surgical History:  Past Surgical History:   Procedure Laterality Date    APPENDECTOMY      CYST REMOVAL      HYSTERECTOMY         Immunization History:   Immunization History   Administered Date(s) Administered    Influenza, High Dose (Fluzone 65 yrs and older) 2018    Pneumococcal Polysaccharide (Avwexbvbu11) 02/15/2013    Td, unspecified formulation 2009    Tdap (Boostrix, Adacel) 2021       Active Problems:  Patient Active Problem List   Diagnosis Code    Spinal stenosis of lumbar region M48.061    Bilateral sciatica M54.31, M54.32    Primary osteoarthritis of right hip M16.11    Greater trochanteric bursitis of right hip M70.61    Chest pain R07.9    Atrial fibrillation with rapid ventricular response (HCC) I48.91    Hypertension I10    Restless legs syndrome (RLS) G25.81    Thyroid disease E07.9    Closed displaced fracture of neck of left fifth metacarpal bone S62.337A    Pain of left hand M79.642    Pneumonia J18.9    Hypokalemia E87.6    Hypomagnesemia E83.42    Rib pain on right side R07.81       Isolation/Infection:   Isolation            No Isolation          Patient Infection Status       Infection Onset Added Last Indicated Last Indicated By Review Planned Expiration Resolved Resolved By    None active    Resolved    COVID-19 Rule Out 06/09/21 06/09/21 06/09/21 COVID-19, Rapid (Ordered)   06/09/21 Rule-Out Test Resulted            Nurse Assessment:  Last Vital Signs: /85   Pulse 87   Temp 97.2 °F (36.2 °C) (Oral)   Resp 18   Ht 5' 5\" (1.651 m)   Wt 158 lb (71.7 kg)   SpO2 92%   Breastfeeding No   BMI 26.29 kg/m²     Last documented pain score (0-10 scale): Pain Level: 7  Last Weight:   Wt Readings from Last 1 Encounters:   06/09/21 158 lb (71.7 kg)     Mental Status:  {IP PT MENTAL STATUS:20030}    IV Access:  { KATERINA IV ACCESS:374315364}    Nursing Mobility/ADLs:  Walking   {P DME FHIN:493882963}  Transfer  {[facility] DME NOBC:908147940}  Bathing  {[facility] DME MSTS:248901756}  Dressing  {P DME HHGR:021836285}  Toileting  {P DME PLYM:071827206}  Feeding  {[facility] DME IVYP:760730833}  Med Admin  {[facility] DME EZOK:355801381}  Med Delivery   { KATERINA MED Delivery:083632135}    Wound Care Documentation and Therapy:        Elimination:  Continence:   · Bowel: {YES / RD:62168}  · Bladder: {YES / OT:98931}  Urinary Catheter: {Urinary Catheter:371078022}   Colostomy/Ileostomy/Ileal Conduit: {YES / MB:21994}       Date of Last BM: ***    Intake/Output Summary (Last 24 hours) at 6/10/2021 1112  Last data filed at 6/9/2021 1731  Gross per 24 hour   Intake 480 ml   Output --   Net 480 ml     I/O last 3 completed shifts:   In: 480 [P.O.:480]  Out: -     Safety Concerns:     [address] KATERINA Safety Concerns:972695257}    Impairments/Disabilities:      [address] BORGES Impairments/Disabilities:183111318}    Nutrition Therapy:  Current Nutrition Therapy:   [address] B[address] Diet List:035929408}    Routes of Feeding: {P DME Other Feedings:670339281}  Liquids: {Slp liquid thickness:23800}  Daily Fluid Restriction: {CHP DME Yes amt example:015638463}  Last Modified Barium Swallow with Video (Video Swallowing Test): {Done Not Done QQUT:336563957}    Treatments at the Time of Hospital Discharge:   Respiratory Treatments: ***  Oxygen Therapy:  {Therapy; copd oxygen:27270}  Ventilator:    {[facility] CC Vent CCVJ:532755555}    Rehab Therapies: {THERAPEUTIC INTERVENTION:9503737552}  Weight Bearing Status/Restrictions: [facility] CC Weight Bearin}  Other Medical Equipment (for information only, NOT a DME order):  {EQUIPMENT:871013452}  Other Treatments: ***    Patient's personal belongings (please select all that are sent with patient):  {CHP DME Belongings:088047797}    RN SIGNATURE:  {Esignature:751109095}    CASE MANAGEMENT/SOCIAL WORK SECTION    Inpatient Status Date: 2021    Readmission Risk Assessment Score:  Readmission Risk              Risk of Unplanned Readmission:  17           Discharging to Facility/ Agency   [facility]  Address: [address] [place] [address]. [address]  Phone: [phone]  Fax: [phone]  ·   · Address:  · Phone:   · Fax:     Dialysis Facility (if applicable)   · Name:  · Address:  · Dialysis Schedule:  · Phone:  · Fax:    / signature: Electronically signed by Angela Hernandez RN on 21 at 12:52 PM EDT    PHYSICIAN SECTION    Prognosis: Fair    Condition at Discharge: Stable    Rehab Potential (if transferring to Rehab): Good    Recommended Labs or Other Treatments After Discharge: cbc,bmp,vanc trough    Physician Certification: I certify the above information and transfer of Leslie Ziegler  is necessary for the continuing treatment of the diagnosis listed and that she requires Serafin Kirbyuel for greater 30 days. Update Admission H&P: No change in H&P    PHYSICIAN SIGNATURE: YOLANDE Joel

## 2021-06-10 NOTE — PROGRESS NOTES
IM Progress Note    Admit Date:  6/9/2021  1    Interval history:  Right LL pna and hypoxia     Subjective:  Ms. Muriel Phillips seen up in bed reports feeling weak and left scapular pain   Hurts to move left shoulder     Objective:   /85   Pulse 87   Temp 97.2 °F (36.2 °C) (Oral)   Resp 18   Ht 5' 5\" (1.651 m)   Wt 158 lb (71.7 kg)   SpO2 95%   Breastfeeding No   BMI 26.29 kg/m²       Intake/Output Summary (Last 24 hours) at 6/10/2021 3096  Last data filed at 6/9/2021 1731  Gross per 24 hour   Intake 480 ml   Output --   Net 480 ml       Physical Exam:        General: elderly female up in bed, sick appearing   Awake, alert and oriented. Appears to be not in any distress  Mucous Membranes:  Pink , anicteric  Neck: No JVD, no carotid bruit, no thyromegaly  Chest:  Clear to auscultation bilaterally, diminished in right Lower lobe  Cardiovascular:  Irregular  S1S2 heard, no murmurs or gallops  Abdomen:  Soft, undistended, non tender, no organomegaly, BS present  Extremities: left scapular pain with movements noted  No edema or cyanosis.  Distal pulses well felt  Neurological : grossly normal        Medications:   Scheduled Medications:    citalopram  10 mg Oral Daily    levothyroxine  50 mcg Oral QAM AC    melatonin  3 mg Oral Daily    sotalol  80 mg Oral BID    rosuvastatin  10 mg Oral Nightly    rivaroxaban  20 mg Oral Daily    pantoprazole  40 mg Oral QAM AC    potassium chloride  10 mEq Oral BID    sodium chloride flush  5-40 mL Intravenous 2 times per day    cefTRIAXone (ROCEPHIN) IV  1,000 mg Intravenous Q24H    And    azithromycin  500 mg Oral Q24H    ipratropium-albuterol  1 ampule Inhalation 4x daily     I   sodium chloride      sodium chloride 75 mL/hr at 06/09/21 1249     sodium chloride flush, sodium chloride, ondansetron **OR** ondansetron, polyethylene glycol, acetaminophen **OR** acetaminophen, ipratropium-albuterol, oxyCODONE-acetaminophen, ketorolac    Lab Data:  Recent Labs 06/09/21  0555 06/10/21  0510   WBC 17.8* 12.8*   HGB 14.5 12.8   HCT 43.8 37.6   MCV 87.7 89.2    167     Recent Labs     06/09/21  0555 06/09/21  1126 06/10/21  0510   * 130* 133*   K 2.7* 3.6 3.6   CL 91* 97* 99   CO2 24 24 29   BUN 15 13 16   CREATININE 0.7 0.6 0.7     Recent Labs     06/09/21  0555   TROPONINI <0.01       Coagulation:   Lab Results   Component Value Date    INR 2.74 12/10/2020    APTT 48.2 12/10/2020     Cardiac markers:   Lab Results   Component Value Date    TROPONINI <0.01 06/09/2021         Lab Results   Component Value Date    ALT 12 06/09/2021    AST 12 (L) 06/09/2021    ALKPHOS 97 06/09/2021    BILITOT 1.5 (H) 06/09/2021       Lab Results   Component Value Date    INR 2.74 (H) 12/10/2020    INR 1.84 (H) 07/01/2020    INR 1.31 (H) 10/17/2014    PROTIME 30.9 (H) 12/10/2020    PROTIME 21.0 (H) 07/01/2020    PROTIME 14.4 (H) 10/17/2014         CULTURES  COVID: neg   Sputum: pending  Blood: pending     EKG:  I have reviewed the EKG with the following interpretation:   Atrial fibrillation with rapid ventricular response with premature ventricular or aberrantly conducted complexes, Left axis deviation, Anterolateral infarct (cited on or before 02-JAN-2006)     RADIOLOGY  CT CHEST PULMONARY EMBOLISM W CONTRAST   Final Result   1. Negative for pulmonary embolus. 2. Right lower lobe pneumonia with small parapneumonic effusion and reactive   right hilar/mediastinal adenopathy.              Left humerus 6/7/2021  No acute osseous abnormality.     EKG - rapid Afibb       ASSESSMENT/PLAN:    Acute hypoxic respiratory failure  - due to pneumonia  - was requiring 2 L O2  - wean as tolerated.      Pneumonia, gram positive organism  - Treat with Rocephin, Zithromax D#2  - Duonebs   - oxygen support, sputum and blood cx  - SLP eval , wean oxygen as able      Pleuritic chest pain right   - likely with pna, neg for PE, prn Toradol, Percocet prn    Left scapular pain- ct neg .  Local lidocaine patch       Leukocytosis  - due to pneumonia , improving with abx        COPD  - stable. No AE  - on Fluticasone, Anoro ellipta.      Hyponatremia  - likely due to fluid volume depletion  - improved with IVF     Hypokalemia  Hypomagnesemia   - replaced electrolytes. Improved  - monitor labs     Hypothyroidism  - home dose of synthroid 50 mcg      Paroxysmal A-fib  - rates controlled  - continue Sotalol  - AC: Xarelto     Hypertension  - BP stable. Hold HCTZ due to hyponatremia     GERD  - on PPI.      Hyperlipidemia  - on Crestor     Depression  - on Celexa     DVT Prophylaxis: Xarelto  Diet: ADULT DIET;  Regular  Code Status: Full Code         Slime Guzman MD, MD 6/10/2021 7:21 AM

## 2021-06-10 NOTE — PROGRESS NOTES
Occupational/Physical Therapy  Orders received, chart reviewed. Patient leaving floor for swallow study. Will reattempt.   Micah Lugo, OTR/L 2536  Martha Oliveros, MS PT, # [id number]

## 2021-06-11 LAB
BANDED NEUTROPHILS RELATIVE PERCENT: 1 % (ref 0–7)
BASOPHILS ABSOLUTE: 0 K/UL (ref 0–0.2)
BASOPHILS RELATIVE PERCENT: 0 %
EOSINOPHILS ABSOLUTE: 0 K/UL (ref 0–0.6)
EOSINOPHILS RELATIVE PERCENT: 0 %
HCT VFR BLD CALC: 38.3 % (ref 36–48)
HEMOGLOBIN: 12.8 G/DL (ref 12–16)
LYMPHOCYTES ABSOLUTE: 0.9 K/UL (ref 1–5.1)
LYMPHOCYTES RELATIVE PERCENT: 6 %
MCH RBC QN AUTO: 29.8 PG (ref 26–34)
MCHC RBC AUTO-ENTMCNC: 33.5 G/DL (ref 31–36)
MCV RBC AUTO: 89.1 FL (ref 80–100)
MONOCYTES ABSOLUTE: 0.6 K/UL (ref 0–1.3)
MONOCYTES RELATIVE PERCENT: 4 %
NEUTROPHILS ABSOLUTE: 13.5 K/UL (ref 1.7–7.7)
NEUTROPHILS RELATIVE PERCENT: 89 %
PDW BLD-RTO: 16.1 % (ref 12.4–15.4)
PLATELET # BLD: 178 K/UL (ref 135–450)
PLATELET SLIDE REVIEW: ADEQUATE
PMV BLD AUTO: 8.8 FL (ref 5–10.5)
PROCALCITONIN: 0.47 NG/ML (ref 0–0.15)
RBC # BLD: 4.3 M/UL (ref 4–5.2)
SLIDE REVIEW: ABNORMAL
WBC # BLD: 15 K/UL (ref 4–11)

## 2021-06-11 PROCEDURE — 94761 N-INVAS EAR/PLS OXIMETRY MLT: CPT

## 2021-06-11 PROCEDURE — 97530 THERAPEUTIC ACTIVITIES: CPT

## 2021-06-11 PROCEDURE — 99232 SBSQ HOSP IP/OBS MODERATE 35: CPT | Performed by: INTERNAL MEDICINE

## 2021-06-11 PROCEDURE — 6360000002 HC RX W HCPCS: Performed by: PHYSICIAN ASSISTANT

## 2021-06-11 PROCEDURE — 94640 AIRWAY INHALATION TREATMENT: CPT

## 2021-06-11 PROCEDURE — 6370000000 HC RX 637 (ALT 250 FOR IP): Performed by: INTERNAL MEDICINE

## 2021-06-11 PROCEDURE — 97116 GAIT TRAINING THERAPY: CPT

## 2021-06-11 PROCEDURE — 2580000003 HC RX 258: Performed by: PHYSICIAN ASSISTANT

## 2021-06-11 PROCEDURE — 87070 CULTURE OTHR SPECIMN AEROBIC: CPT

## 2021-06-11 PROCEDURE — 36415 COLL VENOUS BLD VENIPUNCTURE: CPT

## 2021-06-11 PROCEDURE — 87205 SMEAR GRAM STAIN: CPT

## 2021-06-11 PROCEDURE — 6370000000 HC RX 637 (ALT 250 FOR IP): Performed by: PHYSICIAN ASSISTANT

## 2021-06-11 PROCEDURE — 85025 COMPLETE CBC W/AUTO DIFF WBC: CPT

## 2021-06-11 PROCEDURE — 97162 PT EVAL MOD COMPLEX 30 MIN: CPT

## 2021-06-11 PROCEDURE — 84145 PROCALCITONIN (PCT): CPT

## 2021-06-11 PROCEDURE — 97535 SELF CARE MNGMENT TRAINING: CPT

## 2021-06-11 PROCEDURE — 2700000000 HC OXYGEN THERAPY PER DAY

## 2021-06-11 PROCEDURE — 1200000000 HC SEMI PRIVATE

## 2021-06-11 RX ORDER — METHOCARBAMOL 500 MG/1
500 TABLET, FILM COATED ORAL 3 TIMES DAILY PRN
Status: DISCONTINUED | OUTPATIENT
Start: 2021-06-11 | End: 2021-06-16 | Stop reason: HOSPADM

## 2021-06-11 RX ORDER — HYDROCHLOROTHIAZIDE 25 MG/1
25 TABLET ORAL DAILY
Status: DISCONTINUED | OUTPATIENT
Start: 2021-06-11 | End: 2021-06-16 | Stop reason: HOSPADM

## 2021-06-11 RX ADMIN — POTASSIUM CHLORIDE 10 MEQ: 750 TABLET, EXTENDED RELEASE ORAL at 21:29

## 2021-06-11 RX ADMIN — ROSUVASTATIN CALCIUM 10 MG: 10 TABLET, FILM COATED ORAL at 21:29

## 2021-06-11 RX ADMIN — METHOCARBAMOL TABLETS 500 MG: 500 TABLET, COATED ORAL at 08:01

## 2021-06-11 RX ADMIN — Medication 10 ML: at 21:30

## 2021-06-11 RX ADMIN — METHOCARBAMOL TABLETS 500 MG: 500 TABLET, COATED ORAL at 17:10

## 2021-06-11 RX ADMIN — POTASSIUM CHLORIDE 10 MEQ: 750 TABLET, EXTENDED RELEASE ORAL at 08:01

## 2021-06-11 RX ADMIN — OXYCODONE HYDROCHLORIDE AND ACETAMINOPHEN 1 TABLET: 5; 325 TABLET ORAL at 01:53

## 2021-06-11 RX ADMIN — CEFTRIAXONE SODIUM 1000 MG: 1 INJECTION, POWDER, FOR SOLUTION INTRAMUSCULAR; INTRAVENOUS at 10:21

## 2021-06-11 RX ADMIN — RIVAROXABAN 20 MG: 20 TABLET, FILM COATED ORAL at 17:10

## 2021-06-11 RX ADMIN — SODIUM CHLORIDE 25 ML: 9 INJECTION, SOLUTION INTRAVENOUS at 10:20

## 2021-06-11 RX ADMIN — IPRATROPIUM BROMIDE AND ALBUTEROL SULFATE 1 AMPULE: .5; 3 SOLUTION RESPIRATORY (INHALATION) at 14:47

## 2021-06-11 RX ADMIN — SOTALOL HYDROCHLORIDE 80 MG: 80 TABLET ORAL at 21:29

## 2021-06-11 RX ADMIN — CITALOPRAM HYDROBROMIDE 10 MG: 20 TABLET ORAL at 08:01

## 2021-06-11 RX ADMIN — AZITHROMYCIN 500 MG: 250 TABLET, FILM COATED ORAL at 08:00

## 2021-06-11 RX ADMIN — IPRATROPIUM BROMIDE AND ALBUTEROL SULFATE 1 AMPULE: .5; 3 SOLUTION RESPIRATORY (INHALATION) at 19:36

## 2021-06-11 RX ADMIN — HYDROCHLOROTHIAZIDE 25 MG: 25 TABLET ORAL at 10:21

## 2021-06-11 RX ADMIN — IPRATROPIUM BROMIDE AND ALBUTEROL SULFATE 1 AMPULE: .5; 3 SOLUTION RESPIRATORY (INHALATION) at 07:57

## 2021-06-11 RX ADMIN — OXYCODONE HYDROCHLORIDE AND ACETAMINOPHEN 1 TABLET: 5; 325 TABLET ORAL at 14:35

## 2021-06-11 RX ADMIN — LEVOTHYROXINE SODIUM 50 MCG: 50 TABLET ORAL at 05:53

## 2021-06-11 RX ADMIN — PANTOPRAZOLE SODIUM 40 MG: 40 TABLET, DELAYED RELEASE ORAL at 05:53

## 2021-06-11 RX ADMIN — SOTALOL HYDROCHLORIDE 80 MG: 80 TABLET ORAL at 08:01

## 2021-06-11 ASSESSMENT — PAIN DESCRIPTION - PAIN TYPE
TYPE: ACUTE PAIN

## 2021-06-11 ASSESSMENT — PAIN - FUNCTIONAL ASSESSMENT
PAIN_FUNCTIONAL_ASSESSMENT: ACTIVITIES ARE NOT PREVENTED
PAIN_FUNCTIONAL_ASSESSMENT: PREVENTS OR INTERFERES SOME ACTIVE ACTIVITIES AND ADLS
PAIN_FUNCTIONAL_ASSESSMENT: ACTIVITIES ARE NOT PREVENTED
PAIN_FUNCTIONAL_ASSESSMENT: PREVENTS OR INTERFERES SOME ACTIVE ACTIVITIES AND ADLS

## 2021-06-11 ASSESSMENT — PAIN SCALES - GENERAL
PAINLEVEL_OUTOF10: 0
PAINLEVEL_OUTOF10: 9
PAINLEVEL_OUTOF10: 0
PAINLEVEL_OUTOF10: 2
PAINLEVEL_OUTOF10: 5
PAINLEVEL_OUTOF10: 5
PAINLEVEL_OUTOF10: 4
PAINLEVEL_OUTOF10: 0

## 2021-06-11 ASSESSMENT — PAIN DESCRIPTION - ORIENTATION
ORIENTATION: RIGHT
ORIENTATION: LEFT
ORIENTATION: RIGHT
ORIENTATION: LEFT

## 2021-06-11 ASSESSMENT — PAIN DESCRIPTION - DESCRIPTORS
DESCRIPTORS: SHOOTING
DESCRIPTORS: ACHING
DESCRIPTORS: SHOOTING
DESCRIPTORS: ACHING

## 2021-06-11 ASSESSMENT — PAIN DESCRIPTION - ONSET
ONSET: GRADUAL
ONSET: ON-GOING

## 2021-06-11 ASSESSMENT — PAIN DESCRIPTION - FREQUENCY
FREQUENCY: INTERMITTENT
FREQUENCY: CONTINUOUS
FREQUENCY: CONTINUOUS
FREQUENCY: INTERMITTENT

## 2021-06-11 ASSESSMENT — PAIN DESCRIPTION - LOCATION
LOCATION: RIB CAGE
LOCATION: RIB CAGE
LOCATION: ARM
LOCATION: ARM

## 2021-06-11 ASSESSMENT — PAIN DESCRIPTION - PROGRESSION
CLINICAL_PROGRESSION: NOT CHANGED

## 2021-06-11 NOTE — PROGRESS NOTES
Occupational Therapy Daily Treatment Note    Unit: [address] [place]  Date:  6/11/2021  Patient Name:    Anthony Lubin  Admitting diagnosis:  Pneumonia [J18.9]  Admit Date:  6/9/2021  Precautions/Restrictions:  Fall risk, Bed/chair alarm, Lines -IV and Supplemental O2 (2L) and Telemetry     Started session on 3L O2, reduced to 2L during session by respiratory therapy        Discharge Recommendations: SNF  DME needs for discharge: defer to facility       Therapy recommendations for staff:   Assist of 2 with use of rolling walker (RW) and gait belt for all transfers to/from [facility]  to/from chair    AM-PAC Score: AM-PAC Inpatient Daily Activity Raw Score: [address] S4 Level: Level 3- Safety if going home       Treatment Time:  7779-8103  Treatment number:  2    Timed code treatment minutes: 45 minutes  Total treatment minutes:   45 minutes    History of Present Illness: per MERLENE Cooper NP 6/9/21:  \"The patient is a 80 y.o. female with a-fib, depression, RLS, hypothyroidism who presents to [facility] with c/o left shoulder and back pain.  Ongoing for a week.  She saw her PCP.  Her pain was thought to be musculoskeletal in nature.  She was given steroids.  She states the pain continued and she went to the ED for evaluation. Marily Lares did an X-ray.  She was given a muscle relaxer and discharged home.  She reports having a fever one night.  She has right pleuritic chest pain.  It is located to area under right breast and wraps around to her back.  The pain is worsened by taking a deep breath, movement, or coughing.  She has tried some pain medications, but is unable to tolerate codeine.  This caused her some nausea and vomiting.  She reports poor PO intake the last few days.       She is on 2 L O2. Labs with hypokalemia, hypomagnesemia, hyponatremia, and leukocytosis.  CT chest negative for PE and shows Right lower lobe pneumonia, small para-pneumonic effusion and reactive right hilar/mediastinal adenopathy.  Admitted to med-surg. \"    Subjective:  Patient attempting to get up to [facility] upon arrival, agreeable to therapy. Patient and dtr able to participate in preadmission interview this date, info below:    Preadmission Environment    Pt. Lives Alone  Home environment:            apartment   Steps to enter first floor: No steps  Steps to second floor:         N/A  Bathroom: tub/shower unit, grab bars, standard height commode and with elevated toilet added, has shower chair  Equipment owned: cane (foldable cane), AD for ADLs (reacher) and life alert     Preadmission Status:  Pt. Able to drive: Yes, very limited  Pt Fully independent with ADLs: Yes  Pt sleeps in flat bed  Pt. Required assistance from family for: Independent PTA, family helps with groceries  Pt. independent for transfers and gait and walked with hurricane   Returned to her home at the beginning of May after recouperating from wrist fa, and then subsequent infection of other wrist due to cat bite. History of falls          Yes ~ 3 months ago, fractured wrist, family reports several falls prior to this, but none  since  Has been sleeping in recliner due to L arm pain    Pain   Yes  Rating: severe  Location:LUE and R flank  Pain Medicine Status: Received pain med prior to tx and RN notified      Bed Mobility:   Supine to Sit:  Not Tested  Sit to Supine:  Min A  and 2 persons  Rolling:           Min A   Scooting:        Not Tested    Transfer Training:   Sit to stand:   CGA  Stand to sit:  CGA  Bed to Chair:  Min A  and with RW and gait belt  Chair to [facility]:   CGA pivot without AE, cues for safety  Standard toilet:   Not Tested    Activity Tolerance   Pt completed therapy session with Pain noted with all activity and at rest, SOB with exertion  SpO2 >92% throughout session on 3L initially, decreased to 2L by respiratory therapy during session  HR 86-94    ADL Training:   Upper body dressing:  Mod A change gown  Upper body bathing:  Not Tested  Lower body dressing:  Not Tested  Lower body bathing:  Not Tested  Toileting:   CGA for stance while patient managed pericare in stance after urinating, required MOD A to fully pull up disposable undergarment  Grooming/Hygiene:  Min A brush teeth    Therapeutic Exercise: NA  N/A-deferred due to pain level, SOB    Patient Education:   Role of OT  Recommendations for DC  Safe RW use/hand placement    Positioning Needs:   Pt in bed, alarm set, positioned in proper neutral alignment and pressure relief provided. Call light provided and all needs within reach    Family Present:  Yes, dtr    Assessment: Making progress toward goals, limited by pain and SOB. Continue as tolerated. GOALS  To be met in 3 Visits:  1). Bed to toilet/BSC: SBA     To be met in 5 Visits:  1). Supine to/from Sit:             Supervision  2). Upper Body Bathing:         Supervision  3). Lower Body Bathing:         CGA  4). Upper Body Dressing:       Supervision  5). Lower Body Dressing:       CGA  6).  Pt to demonstrate UE exs x 15 reps with minimal cues    Plan: cont with [address] OTR/L 3237        If patient discharges from this facility prior to next visit, this note will serve as the Discharge Summary

## 2021-06-11 NOTE — PROGRESS NOTES
IM Progress Note    Admit Date:  6/9/2021  2    Interval history:  Right LL pna and hypoxia   Remains on 3 L     Subjective:  Ms. Vidales Elena seen up in bed reports left arm pain improving but robaxin making her sleepy   PT recommends SNF    Objective:   BP (!) 135/21   Pulse 85   Temp 98 °F (36.7 °C) (Oral)   Resp 16   Ht 5' 5\" (1.651 m)   Wt 158 lb (71.7 kg)   SpO2 92%   Breastfeeding No   BMI 26.29 kg/m²       Intake/Output Summary (Last 24 hours) at 6/11/2021 0755  Last data filed at 6/11/2021 0719  Gross per 24 hour   Intake 49.28 ml   Output --   Net 49.28 ml       Physical Exam:        General: elderly female up in bed, sick appearing   Awake, alert and oriented. Appears to be not in any distress  Mucous Membranes:  Pink , anicteric  Neck: No JVD, no carotid bruit, no thyromegaly  Chest:  Clear to auscultation bilaterally, diminished in right Lower lobe with crackles   Cardiovascular:  Irregular  S1S2 heard, no murmurs or gallops  Abdomen:  Soft, undistended, non tender, no organomegaly, BS present  Extremities: left scapular pain and left upper arm pain with movements noted  No edema or cyanosis.  Distal pulses well felt  Neurological : grossly normal        Medications:   Scheduled Medications:    ipratropium-albuterol  1 ampule Inhalation TID    lidocaine  1 patch Transdermal Daily    methocarbamol  500 mg Oral TID    citalopram  10 mg Oral Daily    levothyroxine  50 mcg Oral QAM AC    sotalol  80 mg Oral BID    rosuvastatin  10 mg Oral Nightly    rivaroxaban  20 mg Oral Daily    pantoprazole  40 mg Oral QAM AC    potassium chloride  10 mEq Oral BID    sodium chloride flush  5-40 mL Intravenous 2 times per day    cefTRIAXone (ROCEPHIN) IV  1,000 mg Intravenous Q24H    And    azithromycin  500 mg Oral Q24H     I   sodium chloride      sodium chloride 75 mL/hr at 06/11/21 0721     oxyCODONE-acetaminophen, sodium chloride flush, sodium chloride, ondansetron **OR** ondansetron, polyethylene glycol, acetaminophen **OR** acetaminophen, ipratropium-albuterol    Lab Data:  Recent Labs     06/09/21  0555 06/10/21  0510 06/11/21  0517   WBC 17.8* 12.8* 15.0*   HGB 14.5 12.8 12.8   HCT 43.8 37.6 38.3   MCV 87.7 89.2 89.1    167 178     Recent Labs     06/09/21  0555 06/09/21  1126 06/10/21  0510   * 130* 133*   K 2.7* 3.6 3.6   CL 91* 97* 99   CO2 24 24 29   BUN 15 13 16   CREATININE 0.7 0.6 0.7     Recent Labs     06/09/21  0555   TROPONINI <0.01       Coagulation:   Lab Results   Component Value Date    INR 2.74 12/10/2020    APTT 48.2 12/10/2020     Cardiac markers:   Lab Results   Component Value Date    TROPONINI <0.01 06/09/2021         Lab Results   Component Value Date    ALT 12 06/09/2021    AST 12 (L) 06/09/2021    ALKPHOS 97 06/09/2021    BILITOT 1.5 (H) 06/09/2021       Lab Results   Component Value Date    INR 2.74 (H) 12/10/2020    INR 1.84 (H) 07/01/2020    INR 1.31 (H) 10/17/2014    PROTIME 30.9 (H) 12/10/2020    PROTIME 21.0 (H) 07/01/2020    PROTIME 14.4 (H) 10/17/2014         CULTURES  COVID: neg   Sputum: pending  Blood: pending     EKG:  I have reviewed the EKG with the following interpretation:   Atrial fibrillation with rapid ventricular response with premature ventricular or aberrantly conducted complexes, Left axis deviation, Anterolateral infarct (cited on or before 02-JAN-2006)     RADIOLOGY  CT CHEST PULMONARY EMBOLISM W CONTRAST   Final Result   1. Negative for pulmonary embolus.    2. Right lower lobe pneumonia with small parapneumonic effusion and reactive   right hilar/mediastinal adenopathy.              Left humerus 6/7/2021  No acute osseous abnormality.     EKG - rapid Afibb       ASSESSMENT/PLAN:    Acute hypoxic respiratory failure  - due to pneumonia  - was requiring 2-3 L O2  - wean as tolerated.      Pneumonia, gram positive organism  - Treat with Rocephin, Zithromax D#3  - Duonebs   - oxygen support, sputum and blood cx remain neg   - SLP eval done  No aspiration symptoms   wean oxygen as able   - ambulate     Pleuritic chest pain right   - likely with pna, neg for PE, prn Toradol, Percocet prn    Left scapular pain- ct chest  neg . Local lidocaine patch   likely cervical strain      Leukocytosis  - due to pneumonia , remains high , no fevers, cx remain neg         COPD  - stable. No AE  - on Fluticasone, Anoro ellipta.      Hyponatremia  - likely due to fluid volume depletion  - improved with IVF     Hypokalemia  Hypomagnesemia   - replaced electrolytes. Improved  - monitor labs     Hypothyroidism  - home dose of synthroid 50 mcg      Paroxysmal A-fib  - rates controlled  - continue Sotalol  - AC: Xarelto     Hypertension  - BP stable. Held HCTZ due to hyponatremia- resume      GERD  - on PPI.      Hyperlipidemia  - on Crestor     Depression  - on Celexa     DVT Prophylaxis: Xarelto  Diet: ADULT DIET;  Regular  Code Status: Full Code         Sherif Willis MD, MD 6/11/2021 7:55 AM

## 2021-06-11 NOTE — PROGRESS NOTES
Pain med requested, RN provided percocet    Cognition    A&O Person, Place, Time and Situation   Able to follow 1 step commands    Subjective  Patient attempting to transfer from the chair to the [facility], alarm sounding with family at bedside. Pt agreeable to this PT eval & tx. Upper Extremity ROM/Strength  Please see OT evaluation. Lower Extremity ROM / Strength   AROM WFL: Yes  ROM limitations:     Strength not formally assessed with mmt. Diminished, but demonstrated strength to complete transfer chair to [facility] chair to bed  Lower Extremity Sensation    NT    Lower Extremity Proprioception:   NT    Coordination and Tone  NT    Balance  Sitting:  Good ; Supervision  Comments:     Standing: Fair ; CGA  Comments: using RW, [facility]    Bed Mobility   Supine to Sit:    Not Tested  Sit to Supine:   Min A  and 2 persons  Rolling:   Not Tested  Scooting in sitting: SBA  Scooting in supine: Mod A  and 2 persons    Transfer Training     Sit to stand:   CGA  Stand to sit:   CGA   Chair to [facility],bed:   CGA and 2 persons with use of gait belt and rolling walker (RW); required VC for sequencing of RW, and total assist to manage oxygen tubing    Gait gait completed as indicated below  Distance:      3 ft  Deviations (firm surface/linoleum):  decreased sai and forward flexed posture  Assistive Device Used:    gait belt and rolling walker (RW)  Level of Assist:    CGA and 2 persons  Comment: unsteady, but no LOB,decreased safety awareness    Stair Training deferred, pt does not have stairs in home environment    Activity Tolerance   Pt completed therapy session with SOB noted with all activity,increased recovery time  HR 86-94  Spo2>92 % wearing 2 L  Positioning Needs   Pt in bed, alarm set, positioned in proper neutral alignment and pressure relief provided.    Call light provided and all needs within reach    Exercises Initiated  Rosa deferred secondary to treatment focus on functional mobility and fatigue      Other  CM aware of  Discharge  recommendations    Patient/Family Education   Pt educated on role of inpatient PT, POC, importance of continued activity, DC recommendations, safety awareness, transfer techniques and calling for assist with mobility. Assessment  Pt seen for Physical Therapy evaluation in acute care setting. Pt demonstrated decreased Activity tolerance, Balance, Safety and Strength as well as decreased independence with Ambulation, Bed Mobility  and Transfers. Patient will benefit from skilled PT to maximize functional mobility while in acute care. Recommending SNF upon discharge as patient functioning well below baseline, demonstrates good rehab potential and unable to return home due to burden of care beyond caregiver ability, home environment not conducive to patient recovery and limited safety awareness. Goals : To be met in 3 visits:  1). Independent with LE Ex x 10 reps  2.) Bed to chair: SBA    To be met in 6 visits:  1). Supine to/from sit: Independent  2). Sit to/from stand: SBA  3). Bed to chair: Supervision  4). Gait: Ambulate 125 ft.   with  CGA and use of LRAD (least restrictive assistive device)  5). Tolerate B LE exercises 3 sets of 10-15 reps    Rehabilitation Potential: Good  Strengths for achieving goals include:   Pt motivated, PLOF, Family Support and Pt cooperative   Barriers to achieving goals include:    Pain and Weakness    Plan    To be seen 3-5 x / week  while in acute care setting for therapeutic exercises, bed mobility, transfers, progressive gait training, balance training, and family/patient education. Signature: Tom Law, PT #[id number]     If patient discharges from this facility prior to next visit, this note will serve as the Discharge Summary.

## 2021-06-11 NOTE — PLAN OF CARE
Problem: Falls - Risk of:  Goal: Will remain free from falls  Description: Will remain free from falls  7/29/1490 6611 by Augusta Moncada RN  Outcome: Ongoing  6/10/2021 1144 by Elma Mendoza RN  Outcome: Ongoing  Goal: Absence of physical injury  Description: Absence of physical injury  4/43/6044 7930 by Augusta Moncada RN  Outcome: Ongoing  6/10/2021 1144 by Elma Mendoza RN  Outcome: Ongoing     Problem: Pain:  Goal: Pain level will decrease  Description: Pain level will decrease  8/44/0554 3289 by Augusta Moncada RN  Outcome: Ongoing  6/10/2021 1144 by Elma Mendoza RN  Outcome: Ongoing  Goal: Control of acute pain  Description: Control of acute pain  5/13/0953 9847 by Augusta Moncada RN  Outcome: Ongoing  6/10/2021 1144 by Elma Mendoza RN  Outcome: Ongoing  Goal: Control of chronic pain  Description: Control of chronic pain  2/74/3586 8460 by Augusta Moncada RN  Outcome: Ongoing  6/10/2021 1144 by Elma Mendoza RN  Outcome: Ongoing

## 2021-06-11 NOTE — FLOWSHEET NOTE
06/11/21 1255   Vital Signs   Temp 97.5 °F (36.4 °C)   Temp Source Oral   Pulse 85   Heart Rate Source Monitor   Resp 20   BP (!) 145/99   BP Location Left upper arm   Patient Position Semi fowlers   Level of Consciousness Alert (0)   MEWS Score 1   Patient Currently in Pain Denies   Oxygen Therapy   SpO2 95 %   O2 Device Nasal cannula   O2 Flow Rate (L/min) 3 L/min   /80, Patient up in chair, tolerating well. Family in with patient. Medicated with Percocet as ordered for complains of Right rib cage pain. Patient with productive cough of yellow, thick sputum, culture sent to lab. Will continue to monitor.

## 2021-06-11 NOTE — PLAN OF CARE
Problem: Falls - Risk of:  Goal: Will remain free from falls  6/11/2021 0938 by Hi Nicole RN  Outcome: Ongoing  6/11/2021 0938 by Hi Nicole RN  Outcome: Ongoing  4/52/1851 5560 by Puja Fine RN  Outcome: Ongoing  Goal: Absence of physical injury  6/11/2021 0938 by Hi Nicole RN  Outcome: Ongoing  6/11/2021 0938 by Hi Nicole RN  Outcome: Ongoing  4/58/9092 9148 by Puja Fine RN  Outcome: Ongoing     Problem: Pain:  Goal: Pain level will decrease  6/11/2021 0938 by Hi Nicole RN  Outcome: Ongoing  6/11/2021 0938 by Hi Nicole RN  Outcome: Ongoing  2/85/4070 5909 by Puja Fine RN  Outcome: Ongoing  Goal: Control of acute pain  6/11/2021 0938 by Hi Nicole RN  Outcome: Ongoing  6/11/2021 0938 by Hi Nicole RN  Outcome: Ongoing  1/29/5533 6090 by Puja Fine RN  Outcome: Ongoing  Goal: Control of chronic pain  6/11/2021 0938 by Hi Nicole RN  Outcome: Ongoing  6/11/2021 0938 by Hi Nicole RN  Outcome: Ongoing  0/48/0845 3388 by Puja Fine RN  Outcome: Ongoing  Goal: Patient's pain/discomfort is manageable  Outcome: Ongoing     Problem: Infection:  Goal: Will remain free from infection  Outcome: Ongoing     Problem: Safety:  Goal: Free from accidental physical injury  Outcome: Ongoing  Goal: Free from intentional harm  Outcome: Ongoing     Problem: Daily Care:  Goal: Daily care needs are met  Outcome: Ongoing     Problem: Skin Integrity:  Goal: Skin integrity will stabilize  Outcome: Ongoing     Problem: Discharge Planning:  Goal: Patients continuum of care needs are met  Outcome: Ongoing

## 2021-06-11 NOTE — PROGRESS NOTES
Physical Therapy    Patient was approached for Physical Therapy evaluation today in AM. Patient refused to participate since she did not have good sleep last night and wanted to rest in the bed. Patient was explained with the role of PT and importance of time out of the bed. Patient verbalized agreement and and willing to try therapy later date. Patient will be followed up later as schedule permits. No charge.     Corazon Roberts, MS PT, # [id number]

## 2021-06-11 NOTE — CARE COORDINATION
INTERDISCIPLINARY PLAN OF CARE CONFERENCE    Date/Time: 6/11/2021 1:52 PM  Completed by: Sandy Valdez RN, Case Management      Patient Name:  Michael Mclean  YOB: 1940  Admitting Diagnosis: Pneumonia [J18.9]     Admit Date/Time:  6/9/2021  5:30 AM    Chart reviewed. Interdisciplinary team contacted or reviewed plan related to patient progress and discharge plans. Disciplines included Case Management, Nursing, and Dietitian. Current Status: stable  PT/OT recommendation for discharge plan of care: Pending    Expected D/C Disposition:  Rehab  Confirmed plan with patient Yes   Met with: patient at bedside  Discharge Plan Comments: Reviewed chart and met with pt at bedside. Discussed dcp and pt states will go to rehab. [facility] 1st and [facility] 2nd. Referral called to [facility] who states unsure if will have bed but will call back by end of day with answer. Noted pt on O2 3L NC. Will follow. Spoke with [facility] who states no bed available.  Referral called to Dwayne Paige at [facility] who will review and left VM for acceptance/denial. Nigel aware of prob dc in AM.     Home O2 in place on admit: No  Pt informed of need to bring portable home O2 tank on day of discharge for nursing to connect prior to leaving:  Not Indicated  Verbalized agreement/Understanding:  Not Indicated

## 2021-06-11 NOTE — FLOWSHEET NOTE
06/10/21 2000   Vital Signs   Temp 98.4 °F (36.9 °C)   Temp Source Oral   Pulse 84   Heart Rate Source Monitor   Resp 20   /77   BP Location Left upper arm   Patient Position Semi fowlers   Level of Consciousness Alert (0)   MEWS Score 1   Patient Currently in Pain Yes   Pain Assessment   Pain Assessment 0-10   Pain Level 8   Pain Type Acute pain   Pain Location Arm   Pain Orientation Left   Pain Descriptors Shooting   Clinical Progression Not changed   Patient's Stated Pain Goal No pain   Pain Frequency Continuous   Pain Onset On-going   Functional Pain Assessment Activities are not prevented   Non-Pharmaceutical Pain Intervention(s) Rest  (denies pain medication )   Response to Pain Intervention Patient Satisfied   Oxygen Therapy   SpO2 92 %   Pulse Oximeter Device Mode Intermittent   Pulse Oximeter Device Location Finger   O2 Device Nasal cannula   O2 Flow Rate (L/min) 3 L/min   Pm assessment completed see flow sheet. Patient up in chair, awake and oriented x 4, requested to return to bed. Reporting pain at 8/10 in left arm, denies pain medication but Robaxin was started this evening. Night medication given per MAR. Patient denies other needs at time of assessment. Bed in lowest position, bed alarm on, call light in reach.

## 2021-06-11 NOTE — FLOWSHEET NOTE
06/11/21 0741   Vital Signs   Temp 98 °F (36.7 °C)   Temp Source Oral   Pulse 85   Heart Rate Source Monitor   Resp 16   BP (!) 135/21   BP Location Left upper arm   Patient Position Semi fowlers   Level of Consciousness Alert (0)   MEWS Score 1   Patient Currently in Pain Denies   Pain Assessment   Pain Assessment 0-10   Pain Level 0   Oxygen Therapy   SpO2 92 %   O2 Device Nasal cannula   O2 Flow Rate (L/min) 3 L/min   Patient SOB on exertion, states \"stays out of breath most of time I have COPD, get out of breath just by getting up and going to bathroom. \" Pursed Lip breathing teaching done with effect. Complains of left arm pain and right rib pain, states \"been this way for 2 weeks now after I moved some flowers around. \"  Robaxin given as ordered. PRN pain med due at 10:00 AM. Patient up to bedside commode, able to do ADL. Fed self 100%   of breakfast. Refuses to sit up in chair or bath at this time. Will continue to monitor.

## 2021-06-12 ENCOUNTER — APPOINTMENT (OUTPATIENT)
Dept: GENERAL RADIOLOGY | Age: 81
DRG: 177 | End: 2021-06-12
Payer: MEDICARE

## 2021-06-12 LAB
ANION GAP SERPL CALCULATED.3IONS-SCNC: 12 MMOL/L (ref 3–16)
ANISOCYTOSIS: ABNORMAL
BANDED NEUTROPHILS RELATIVE PERCENT: 2 % (ref 0–7)
BASOPHILS ABSOLUTE: 0 K/UL (ref 0–0.2)
BASOPHILS RELATIVE PERCENT: 0 %
BUN BLDV-MCNC: 10 MG/DL (ref 7–20)
CALCIUM SERPL-MCNC: 8.7 MG/DL (ref 8.3–10.6)
CHLORIDE BLD-SCNC: 94 MMOL/L (ref 99–110)
CO2: 25 MMOL/L (ref 21–32)
CREAT SERPL-MCNC: <0.5 MG/DL (ref 0.6–1.2)
EOSINOPHILS ABSOLUTE: 0.2 K/UL (ref 0–0.6)
EOSINOPHILS RELATIVE PERCENT: 1 %
GFR AFRICAN AMERICAN: >60
GFR NON-AFRICAN AMERICAN: >60
GLUCOSE BLD-MCNC: 98 MG/DL (ref 70–99)
HCT VFR BLD CALC: 39.3 % (ref 36–48)
HEMOGLOBIN: 13.1 G/DL (ref 12–16)
LYMPHOCYTES ABSOLUTE: 2.2 K/UL (ref 1–5.1)
LYMPHOCYTES RELATIVE PERCENT: 10 %
MAGNESIUM: 2 MG/DL (ref 1.8–2.4)
MCH RBC QN AUTO: 29.8 PG (ref 26–34)
MCHC RBC AUTO-ENTMCNC: 33.4 G/DL (ref 31–36)
MCV RBC AUTO: 89.4 FL (ref 80–100)
MONOCYTES ABSOLUTE: 0.4 K/UL (ref 0–1.3)
MONOCYTES RELATIVE PERCENT: 2 %
NEUTROPHILS ABSOLUTE: 19.1 K/UL (ref 1.7–7.7)
NEUTROPHILS RELATIVE PERCENT: 85 %
PDW BLD-RTO: 16.5 % (ref 12.4–15.4)
PLATELET # BLD: 191 K/UL (ref 135–450)
PLATELET SLIDE REVIEW: ADEQUATE
PMV BLD AUTO: 8.6 FL (ref 5–10.5)
POTASSIUM REFLEX MAGNESIUM: 3.3 MMOL/L (ref 3.5–5.1)
RBC # BLD: 4.39 M/UL (ref 4–5.2)
SLIDE REVIEW: ABNORMAL
SODIUM BLD-SCNC: 131 MMOL/L (ref 136–145)
VACUOLATED NEUTROPHILS: PRESENT
WBC # BLD: 21.9 K/UL (ref 4–11)

## 2021-06-12 PROCEDURE — 83735 ASSAY OF MAGNESIUM: CPT

## 2021-06-12 PROCEDURE — 6370000000 HC RX 637 (ALT 250 FOR IP): Performed by: INTERNAL MEDICINE

## 2021-06-12 PROCEDURE — 94640 AIRWAY INHALATION TREATMENT: CPT

## 2021-06-12 PROCEDURE — 71045 X-RAY EXAM CHEST 1 VIEW: CPT

## 2021-06-12 PROCEDURE — 99232 SBSQ HOSP IP/OBS MODERATE 35: CPT | Performed by: INTERNAL MEDICINE

## 2021-06-12 PROCEDURE — 6370000000 HC RX 637 (ALT 250 FOR IP): Performed by: PHYSICIAN ASSISTANT

## 2021-06-12 PROCEDURE — 80048 BASIC METABOLIC PNL TOTAL CA: CPT

## 2021-06-12 PROCEDURE — 36415 COLL VENOUS BLD VENIPUNCTURE: CPT

## 2021-06-12 PROCEDURE — 6360000002 HC RX W HCPCS: Performed by: PHYSICIAN ASSISTANT

## 2021-06-12 PROCEDURE — 94761 N-INVAS EAR/PLS OXIMETRY MLT: CPT

## 2021-06-12 PROCEDURE — 85025 COMPLETE CBC W/AUTO DIFF WBC: CPT

## 2021-06-12 PROCEDURE — 99223 1ST HOSP IP/OBS HIGH 75: CPT | Performed by: INTERNAL MEDICINE

## 2021-06-12 PROCEDURE — 2700000000 HC OXYGEN THERAPY PER DAY

## 2021-06-12 PROCEDURE — 2580000003 HC RX 258: Performed by: PHYSICIAN ASSISTANT

## 2021-06-12 PROCEDURE — 1200000000 HC SEMI PRIVATE

## 2021-06-12 PROCEDURE — 2580000003 HC RX 258: Performed by: INTERNAL MEDICINE

## 2021-06-12 PROCEDURE — 6360000002 HC RX W HCPCS: Performed by: INTERNAL MEDICINE

## 2021-06-12 RX ORDER — AZITHROMYCIN 250 MG/1
250 TABLET, FILM COATED ORAL DAILY
Status: COMPLETED | OUTPATIENT
Start: 2021-06-13 | End: 2021-06-14

## 2021-06-12 RX ORDER — OXYCODONE HYDROCHLORIDE AND ACETAMINOPHEN 5; 325 MG/1; MG/1
1 TABLET ORAL EVERY 6 HOURS PRN
Status: DISCONTINUED | OUTPATIENT
Start: 2021-06-12 | End: 2021-06-14

## 2021-06-12 RX ORDER — AZITHROMYCIN 250 MG/1
250 TABLET, FILM COATED ORAL DAILY
Status: DISCONTINUED | OUTPATIENT
Start: 2021-06-12 | End: 2021-06-12

## 2021-06-12 RX ADMIN — POTASSIUM CHLORIDE 10 MEQ: 750 TABLET, EXTENDED RELEASE ORAL at 20:11

## 2021-06-12 RX ADMIN — SODIUM CHLORIDE 25 ML: 9 INJECTION, SOLUTION INTRAVENOUS at 15:38

## 2021-06-12 RX ADMIN — IPRATROPIUM BROMIDE AND ALBUTEROL SULFATE 1 AMPULE: .5; 3 SOLUTION RESPIRATORY (INHALATION) at 13:27

## 2021-06-12 RX ADMIN — AZITHROMYCIN 500 MG: 250 TABLET, FILM COATED ORAL at 09:30

## 2021-06-12 RX ADMIN — ROSUVASTATIN CALCIUM 10 MG: 10 TABLET, FILM COATED ORAL at 20:11

## 2021-06-12 RX ADMIN — METHOCARBAMOL TABLETS 500 MG: 500 TABLET, COATED ORAL at 04:15

## 2021-06-12 RX ADMIN — LEVOTHYROXINE SODIUM 50 MCG: 50 TABLET ORAL at 04:35

## 2021-06-12 RX ADMIN — PANTOPRAZOLE SODIUM 40 MG: 40 TABLET, DELAYED RELEASE ORAL at 04:34

## 2021-06-12 RX ADMIN — Medication 5 ML: at 18:28

## 2021-06-12 RX ADMIN — METHOCARBAMOL TABLETS 500 MG: 500 TABLET, COATED ORAL at 20:11

## 2021-06-12 RX ADMIN — SOTALOL HYDROCHLORIDE 80 MG: 80 TABLET ORAL at 09:30

## 2021-06-12 RX ADMIN — SODIUM CHLORIDE 25 ML: 9 INJECTION, SOLUTION INTRAVENOUS at 09:35

## 2021-06-12 RX ADMIN — CEFTRIAXONE SODIUM 1000 MG: 1 INJECTION, POWDER, FOR SOLUTION INTRAMUSCULAR; INTRAVENOUS at 09:36

## 2021-06-12 RX ADMIN — CITALOPRAM HYDROBROMIDE 10 MG: 20 TABLET ORAL at 09:30

## 2021-06-12 RX ADMIN — ACETAMINOPHEN 650 MG: 325 TABLET ORAL at 15:23

## 2021-06-12 RX ADMIN — IPRATROPIUM BROMIDE AND ALBUTEROL SULFATE 1 AMPULE: .5; 3 SOLUTION RESPIRATORY (INHALATION) at 07:30

## 2021-06-12 RX ADMIN — IPRATROPIUM BROMIDE AND ALBUTEROL SULFATE 1 AMPULE: .5; 3 SOLUTION RESPIRATORY (INHALATION) at 20:59

## 2021-06-12 RX ADMIN — OXYCODONE HYDROCHLORIDE AND ACETAMINOPHEN 1 TABLET: 5; 325 TABLET ORAL at 18:01

## 2021-06-12 RX ADMIN — OXYCODONE HYDROCHLORIDE AND ACETAMINOPHEN 1 TABLET: 5; 325 TABLET ORAL at 00:52

## 2021-06-12 RX ADMIN — POTASSIUM CHLORIDE 10 MEQ: 750 TABLET, EXTENDED RELEASE ORAL at 09:30

## 2021-06-12 RX ADMIN — VANCOMYCIN HYDROCHLORIDE 1250 MG: 10 INJECTION, POWDER, LYOPHILIZED, FOR SOLUTION INTRAVENOUS at 15:38

## 2021-06-12 RX ADMIN — HYDROCHLOROTHIAZIDE 25 MG: 25 TABLET ORAL at 09:30

## 2021-06-12 RX ADMIN — OXYCODONE HYDROCHLORIDE AND ACETAMINOPHEN 1 TABLET: 5; 325 TABLET ORAL at 09:30

## 2021-06-12 RX ADMIN — NYSTATIN 5 ML: 500000 SUSPENSION ORAL at 20:12

## 2021-06-12 RX ADMIN — SOTALOL HYDROCHLORIDE 80 MG: 80 TABLET ORAL at 20:11

## 2021-06-12 RX ADMIN — CEFEPIME 2000 MG: 2 INJECTION, POWDER, FOR SOLUTION INTRAVENOUS at 18:03

## 2021-06-12 RX ADMIN — Medication 10 ML: at 20:12

## 2021-06-12 ASSESSMENT — PAIN SCALES - GENERAL
PAINLEVEL_OUTOF10: 0
PAINLEVEL_OUTOF10: 6
PAINLEVEL_OUTOF10: 9
PAINLEVEL_OUTOF10: 7
PAINLEVEL_OUTOF10: 7

## 2021-06-12 ASSESSMENT — PAIN DESCRIPTION - LOCATION: LOCATION: RIB CAGE

## 2021-06-12 ASSESSMENT — PAIN DESCRIPTION - ORIENTATION: ORIENTATION: RIGHT

## 2021-06-12 ASSESSMENT — PAIN DESCRIPTION - PAIN TYPE: TYPE: ACUTE PAIN

## 2021-06-12 NOTE — PROGRESS NOTES
Bedside report given to [facility] RN  pt in stable condition no needs at this time.  Call light within reach

## 2021-06-12 NOTE — PROGRESS NOTES
IM Progress Note    Admit Date:  6/9/2021  3    Interval history:  Right LL pna and hypoxia   Remains on 3 L     Subjective:  Ms. Rosalina Motta says she feels about the same  C/o pain right-sided ribs in the side, left shoulder      Objective:   BP (!) 144/97   Pulse 89   Temp 98.6 °F (37 °C) (Oral)   Resp 18   Ht 5' 5\" (1.651 m)   Wt 158 lb (71.7 kg)   SpO2 92%   Breastfeeding No   BMI 26.29 kg/m²       Intake/Output Summary (Last 24 hours) at 6/12/2021 0906  Last data filed at 6/11/2021 1448  Gross per 24 hour   Intake 775.92 ml   Output 1100 ml   Net -324.08 ml       Physical Exam:        General: elderly female up in bed, sick appearing   Awake, alert and oriented. Appears to be not in any distress  Mucous Membranes:  Pink , anicteric  Neck: No JVD, no carotid bruit, no thyromegaly  Chest:  Clear to auscultation bilaterally, diminished in right Lower lobe with crackles   Cardiovascular:  Irregular  S1S2 heard, no murmurs or gallops  Abdomen:  Soft, undistended, non tender, no organomegaly, BS present  Extremities: left scapular pain and left upper arm pain with movements noted  No edema or cyanosis.  Distal pulses well felt  Neurological : grossly normal        Medications:   Scheduled Medications:    hydroCHLOROthiazide  25 mg Oral Daily    ipratropium-albuterol  1 ampule Inhalation TID    lidocaine  1 patch Transdermal Daily    citalopram  10 mg Oral Daily    levothyroxine  50 mcg Oral QAM AC    sotalol  80 mg Oral BID    rosuvastatin  10 mg Oral Nightly    rivaroxaban  20 mg Oral Daily    pantoprazole  40 mg Oral QAM AC    potassium chloride  10 mEq Oral BID    sodium chloride flush  5-40 mL Intravenous 2 times per day    cefTRIAXone (ROCEPHIN) IV  1,000 mg Intravenous Q24H    And    azithromycin  500 mg Oral Q24H     I   sodium chloride Stopped (06/11/21 1102)     methocarbamol, oxyCODONE-acetaminophen, sodium chloride flush, sodium chloride, ondansetron **OR** ondansetron, polyethylene glycol, acetaminophen **OR** acetaminophen, ipratropium-albuterol    Lab Data:  Recent Labs     06/10/21  0510 06/11/21  0517 06/12/21  0627   WBC 12.8* 15.0* 21.9*   HGB 12.8 12.8 13.1   HCT 37.6 38.3 39.3   MCV 89.2 89.1 89.4    178 191     Recent Labs     06/09/21  1126 06/10/21  0510 06/12/21  0627   * 133* 131*   K 3.6 3.6 3.3*   CL 97* 99 94*   CO2 24 29 25   BUN 13 16 10   CREATININE 0.6 0.7 <0.5*     No results for input(s): CKTOTAL, CKMB, CKMBINDEX, TROPONINI in the last 72 hours. Coagulation:   Lab Results   Component Value Date    INR 2.74 12/10/2020    APTT 48.2 12/10/2020     Cardiac markers:   Lab Results   Component Value Date    TROPONINI <0.01 06/09/2021         Lab Results   Component Value Date    ALT 12 06/09/2021    AST 12 (L) 06/09/2021    ALKPHOS 97 06/09/2021    BILITOT 1.5 (H) 06/09/2021       Lab Results   Component Value Date    INR 2.74 (H) 12/10/2020    INR 1.84 (H) 07/01/2020    INR 1.31 (H) 10/17/2014    PROTIME 30.9 (H) 12/10/2020    PROTIME 21.0 (H) 07/01/2020    PROTIME 14.4 (H) 10/17/2014         CULTURES  COVID: neg   Sputum: pending  Blood: pending     EKG:  I have reviewed the EKG with the following interpretation:   Atrial fibrillation with rapid ventricular response with premature ventricular or aberrantly conducted complexes, Left axis deviation, Anterolateral infarct (cited on or before 02-JAN-2006)     RADIOLOGY  CT CHEST PULMONARY EMBOLISM W CONTRAST   Final Result   1. Negative for pulmonary embolus.    2. Right lower lobe pneumonia with small parapneumonic effusion and reactive   right hilar/mediastinal adenopathy.              Left humerus 6/7/2021  No acute osseous abnormality.     EKG - rapid Afibb       ASSESSMENT/PLAN:    Acute hypoxic respiratory failure  - due to pneumonia  - was requiring 2-3 L O2  - wean as tolerated.      Pneumonia, gram positive organism  - Treat with Rocephin, Zithromax D#4  - Duonebs   - oxygen support, sputum and blood cx remain neg   - SLP eval done  No aspiration symptoms   wean oxygen as able   Worsening leukocytosis today. Repeat chest x-ray today. Pulmonary consult. Concern for worsening pleural effusion. Pleuritic chest pain right   - likely with pna, neg for PE, prn Toradol, Percocet prn    Left scapular pain- ct chest  neg . Local lidocaine patch   likely cervical strain      Leukocytosis  - due to pneumonia , remains high , no fevers, cx remain neg    Worsening leucocytosis       COPD  - stable. No AE  - on Fluticasone, Anoro ellipta.      Hyponatremia  - likely due to fluid volume depletion  - improved with IVF     Hypokalemia  Hypomagnesemia   - replaced electrolytes. Improved  - monitor labs     Hypothyroidism  - home dose of synthroid 50 mcg      Paroxysmal A-fib  - rates controlled  - continue Sotalol  - AC: Xarelto     Hypertension  - BP stable. Held HCTZ due to hyponatremia- resume      GERD  - on PPI.      Hyperlipidemia  - on Crestor     Depression  - on Celexa     DVT Prophylaxis: Xarelto  Diet: ADULT DIET;  Regular  Code Status: Full Code         Fernando Magallon MD, MD 6/12/2021 9:06 AM

## 2021-06-12 NOTE — PROGRESS NOTES
FYI:  Additive QT interval prolongation may occur during combination therapy of Azithromycin, Citalopram, Ondansetron, and Sotalol. Most adverse events occur when the QTC > 500. Patient's QTC = 502. Compazine has lesser effects on the QT interval and can be ordered as an alternative to Zofran. Observe for signs and symptoms.   Ligia Villagomez R.Ph.6/12/20212:53 PM

## 2021-06-12 NOTE — CONSULTS
Pharmacy Note  Vancomycin Consult    Cathi Loredo is a [de-identified] y.o. female started on Vancomycin for pneumonia; consult received from Dr. Nagi Lane to manage therapy. Also receiving the following antibiotics: Zithromax/Cefepime. Patient Active Problem List   Diagnosis    Spinal stenosis of lumbar region    Bilateral sciatica    Primary osteoarthritis of right hip    Greater trochanteric bursitis of right hip    Chest pain    Atrial fibrillation with rapid ventricular response (HCC)    Hypertension    Restless legs syndrome (RLS)    Thyroid disease    Closed displaced fracture of neck of left fifth metacarpal bone    Left arm pain    Pneumonia    Hypokalemia    Hypomagnesemia    Rib pain on right side    Acute respiratory failure with hypoxia (HCC)    Leukocytosis     Allergies:  Patient has no known allergies. Temp max: 97.5 degrees F    Recent Labs     06/10/21  0510 06/11/21  0517 06/12/21  0627   BUN 16  --  10   CREATININE 0.7  --  <0.5*   WBC 12.8* 15.0* 21.9*       Intake/Output Summary (Last 24 hours) at 6/12/2021 1527  Last data filed at 6/12/2021 1451  Gross per 24 hour   Intake 107 ml   Output --   Net 107 ml     Culture Date      Source                       Results      Ht Readings from Last 1 Encounters:   06/09/21 5' 5\" (1.651 m)        Wt Readings from Last 1 Encounters:   06/09/21 158 lb (71.7 kg)       Body mass index is 26.29 kg/m². CrCl cannot be calculated (This lab value cannot be used to calculate CrCl because it is not a number: <0.5). Goal Trough Level: 15-20 mcg/mL    Assessment/Plan:  Will initiate Vancomycin 1250 mg IV every 24 hours. Timing of trough level will be determined based on culture results, renal function, and clinical response. Thank you for the consult. Will continue to follow.    MODESTO Eric.Ph.6/12/20213:29 PM
Meds:  magic (miracle) mouthwash with nystatin, methocarbamol, oxyCODONE-acetaminophen, sodium chloride flush, sodium chloride, ondansetron **OR** ondansetron, polyethylene glycol, acetaminophen **OR** acetaminophen, ipratropium-albuterol    ALLERGIES:  Patient has No Known Allergies. REVIEW OF SYSTEMS:  Constitutional: + for fever  HENT: Negative for sore throat  Eyes: Negative for redness   Respiratory:+ for dyspnea, cough  Cardiovascular: Negative for chest pain  Gastrointestinal: Negative for vomiting, diarrhea   Genitourinary: Negative for hematuria   Musculoskeletal: Negative for arthralgias   Skin: Negative for rash  Neurological: Negative for syncope  Hematological: Negative for adenopathy  Psychiatric/Behavorial: Negative for anxiety    PHYSICAL EXAM:  Blood pressure (!) 139/92, pulse 88, temperature 97.5 °F (36.4 °C), temperature source Oral, resp. rate 18, height 5' 5\" (1.651 m), weight 158 lb (71.7 kg), SpO2 93 %, not currently breastfeeding.' on 2L NC Tm 99.3  Gen: Mild distress. Elderly woman. Eyes: PERRL. No sclera icterus. No conjunctival injection. ENT: No discharge. Pharynx clear. Neck: Trachea midline. No obvious mass. Resp: No accessory muscle use. No crackles. No wheezes. No rhonchi. R sided dullness on percussion. CV: Regular rate. Regular rhythm. No murmur or rub. No edema. GI: Non-tender. Non-distended. No hernia. Skin: Warm and dry. No nodule on exposed extremities. M/S: No cyanosis. No joint deformity. No clubbing. Neuro: Awake. Alert. Moves all four extremities. Psych: Oriented x 3. No anxiety.      LABS:  CBC:   Recent Labs     06/10/21  0510 06/11/21  0517 06/12/21  0627   WBC 12.8* 15.0* 21.9*   HGB 12.8 12.8 13.1   HCT 37.6 38.3 39.3   MCV 89.2 89.1 89.4    178 191     BMP:   Recent Labs     06/10/21  0510 06/12/21  0627   * 131*   K 3.6 3.3*   CL 99 94*   CO2 29 25   BUN 16 10   CREATININE 0.7 <0.5*     LIVER PROFILE: No results for input(s): AST,

## 2021-06-12 NOTE — PROGRESS NOTES
BP (!) 144/97   Pulse 89   Temp 98.6 °F (37 °C) (Oral)   Resp 18   Ht 5' 5\" (1.651 m)   Wt 158 lb (71.7 kg)   SpO2 92%   Breastfeeding No   BMI 26.29 kg/m²      Assessment complete. Meds passed. Pt denies needs at this time   Lidocaine patch applied to Left posterior upper arm. Pt groggy. Bedside Mobility Assessment Tool (BMAT):     Assessment Level 1- Sit and Shake    1. From a semi-reclined position, ask patient to sit up and rotate to a seated position at the side of the bed. Can use the bedrail. 2. Ask patient to reach out and grab your hand and shake making sure patient reaches across his/her midline. Pass- Patient is able to come to a seated position, maintain core strength. Maintains seated balance while reaching across midline. Move on to Assessment Level 2. Assessment Level 2- Stretch and Point   1. With patient in seated position at the side of the bed, have patient place both feet on the floor (or stool) with knees no higher than hips. 2. Ask patient to stretch one leg and straighten the knee, then bend the ankle/flex and point the toes. If appropriate, repeat with the other leg. Pass- Patient is able to demonstrate appropriate quad strength on intended weight bearing limb(s). Move onto Assessment Level 3. Assessment Level 3- Stand   1. Ask patient to elevate off the bed or chair (seated to standing) using an assistive device (cane, bedrail). 2. Patient should be able to raise buttocks off be and hold for a count of five. May repeat once. Pass- Patient maintains standing stability for at least 5 seconds, proceed to assessment level 4. Assessment Level 4- Walk   1. Ask patient to march in place at bedside. 2. Then ask patient to advance step and return each foot. Some medical conditions may render a patient from stepping backwards, use your best clinical judgement. Fail- Patient not able to complete tasks OR requires use of assistive device.  Patient is MOBILITY LEVEL 3. Mobility Level- 3   Assist x2 to bsc. Pt needs verbal ques. Pt likes to close her eyes and walk around when helped being moved.

## 2021-06-12 NOTE — PLAN OF CARE
Problem: Falls - Risk of:  Goal: Will remain free from falls  Description: Will remain free from falls  6/11/2021 2324 by Araseli Pineda RN  Outcome: Ongoing  6/11/2021 0938 by Kiran Jordan RN  Outcome: Ongoing  6/11/2021 0938 by Kiran Jordan RN  Outcome: Ongoing  Goal: Absence of physical injury  Description: Absence of physical injury  6/11/2021 0938 by Kiran Jordan RN  Outcome: Ongoing  6/11/2021 0938 by Kiran Jordan RN  Outcome: Ongoing     Problem: Pain:  Goal: Pain level will decrease  Description: Pain level will decrease  6/11/2021 2324 by Araseli Pineda RN  Outcome: Ongoing  6/11/2021 0938 by Kiran Jordan RN  Outcome: Ongoing  6/11/2021 0938 by Kiran Jordan RN  Outcome: Ongoing  Goal: Control of acute pain  Description: Control of acute pain  6/11/2021 2324 by Araseli Pineda RN  Outcome: Ongoing  6/11/2021 0938 by Kiran Jordan RN  Outcome: Ongoing  6/11/2021 0938 by Kiran Jordan RN  Outcome: Ongoing  Goal: Control of chronic pain  Description: Control of chronic pain  6/11/2021 0938 by Kiran Jordan RN  Outcome: Ongoing  6/11/2021 0938 by Kiran Jordan RN  Outcome: Ongoing  Goal: Patient's pain/discomfort is manageable  Description: Patient's pain/discomfort is manageable  6/11/2021 0938 by Kiran Jordan RN  Outcome: Ongoing     Problem: Infection:  Goal: Will remain free from infection  Description: Will remain free from infection  6/11/2021 0938 by Kiran Jordan RN  Outcome: Ongoing     Problem: Safety:  Goal: Free from accidental physical injury  Description: Free from accidental physical injury  6/11/2021 0938 by Kiran Jordan RN  Outcome: Ongoing  Goal: Free from intentional harm  Description: Free from intentional harm  6/11/2021 0938 by Kiran Jordan RN  Outcome: Ongoing     Problem: Daily Care:  Goal: Daily care needs are met  Description: Daily care needs are met  6/11/2021 2324 by Araseli Pineda RN  Outcome: Ongoing  6/11/2021 0938 by Jhonatan Nunez RN  Outcome: Ongoing     Problem: Skin Integrity:  Goal: Skin integrity will stabilize  Description: Skin integrity will stabilize  6/11/2021 0938 by Jhonatan Nunez RN  Outcome: Ongoing     Problem: Discharge Planning:  Goal: Patients continuum of care needs are met  Description: Patients continuum of care needs are met  6/11/2021 0938 by Jhonatan Nunez RN  Outcome: Ongoing

## 2021-06-12 NOTE — PROGRESS NOTES
Pt to [facility] and back to bed. PRN Robaxin given. Pt started chewing the pill educated pt on not chewing her medication and to take with water. Pt states she takes her pills with pudding, pt tolerated her evening meds with water. Pt making herself wheeze as she is taking her medication. Once RN and PCA left room pt stopped. Will continue to monitor. Call light within reach and bed alarm on.

## 2021-06-12 NOTE — PLAN OF CARE
Problem: Falls - Risk of:  Goal: Will remain free from falls  Description: Will remain free from falls  6/11/2021 2324 by Syeda Sena RN  Outcome: Ongoing  Goal: Absence of physical injury  Description: Absence of physical injury  Outcome: Ongoing     Problem: Pain:  Goal: Pain level will decrease  Description: Pain level will decrease  6/11/2021 2324 by Syeda Sena RN  Outcome: Ongoing  Goal: Control of acute pain  Description: Control of acute pain  6/11/2021 2324 by Syeda Sena RN  Outcome: Ongoing  Goal: Control of chronic pain  Description: Control of chronic pain  Outcome: Ongoing  Goal: Patient's pain/discomfort is manageable  Description: Patient's pain/discomfort is manageable  Outcome: Ongoing     Problem: Daily Care:  Goal: Daily care needs are met  Description: Daily care needs are met  6/11/2021 2324 by Syeda Sena RN  Outcome: Ongoing

## 2021-06-13 LAB
ANION GAP SERPL CALCULATED.3IONS-SCNC: 11 MMOL/L (ref 3–16)
BANDED NEUTROPHILS RELATIVE PERCENT: 3 % (ref 0–7)
BASOPHILS ABSOLUTE: 0.2 K/UL (ref 0–0.2)
BASOPHILS RELATIVE PERCENT: 1 %
BUN BLDV-MCNC: 11 MG/DL (ref 7–20)
CALCIUM SERPL-MCNC: 8.5 MG/DL (ref 8.3–10.6)
CHLORIDE BLD-SCNC: 95 MMOL/L (ref 99–110)
CO2: 23 MMOL/L (ref 21–32)
CREAT SERPL-MCNC: <0.5 MG/DL (ref 0.6–1.2)
CULTURE, RESPIRATORY: NORMAL
EOSINOPHILS ABSOLUTE: 0 K/UL (ref 0–0.6)
EOSINOPHILS RELATIVE PERCENT: 0 %
GFR AFRICAN AMERICAN: >60
GFR NON-AFRICAN AMERICAN: >60
GLUCOSE BLD-MCNC: 104 MG/DL (ref 70–99)
GRAM STAIN RESULT: NORMAL
HCT VFR BLD CALC: 37.7 % (ref 36–48)
HEMOGLOBIN: 12.5 G/DL (ref 12–16)
LYMPHOCYTES ABSOLUTE: 1.4 K/UL (ref 1–5.1)
LYMPHOCYTES RELATIVE PERCENT: 7 %
MAGNESIUM: 2 MG/DL (ref 1.8–2.4)
MCH RBC QN AUTO: 29.6 PG (ref 26–34)
MCHC RBC AUTO-ENTMCNC: 33.3 G/DL (ref 31–36)
MCV RBC AUTO: 88.9 FL (ref 80–100)
METAMYELOCYTES RELATIVE PERCENT: 1 %
MONOCYTES ABSOLUTE: 1 K/UL (ref 0–1.3)
MONOCYTES RELATIVE PERCENT: 5 %
NEUTROPHILS ABSOLUTE: 17.4 K/UL (ref 1.7–7.7)
NEUTROPHILS RELATIVE PERCENT: 83 %
PDW BLD-RTO: 16.2 % (ref 12.4–15.4)
PLATELET # BLD: 198 K/UL (ref 135–450)
PLATELET SLIDE REVIEW: ADEQUATE
PMV BLD AUTO: 8.8 FL (ref 5–10.5)
POTASSIUM REFLEX MAGNESIUM: 3.5 MMOL/L (ref 3.5–5.1)
RBC # BLD: 4.23 M/UL (ref 4–5.2)
SLIDE REVIEW: ABNORMAL
SODIUM BLD-SCNC: 129 MMOL/L (ref 136–145)
VACUOLATED NEUTROPHILS: PRESENT
WBC # BLD: 20 K/UL (ref 4–11)

## 2021-06-13 PROCEDURE — 6370000000 HC RX 637 (ALT 250 FOR IP): Performed by: INTERNAL MEDICINE

## 2021-06-13 PROCEDURE — 2580000003 HC RX 258: Performed by: PHYSICIAN ASSISTANT

## 2021-06-13 PROCEDURE — 1200000000 HC SEMI PRIVATE

## 2021-06-13 PROCEDURE — 36415 COLL VENOUS BLD VENIPUNCTURE: CPT

## 2021-06-13 PROCEDURE — 83735 ASSAY OF MAGNESIUM: CPT

## 2021-06-13 PROCEDURE — 2700000000 HC OXYGEN THERAPY PER DAY

## 2021-06-13 PROCEDURE — 99232 SBSQ HOSP IP/OBS MODERATE 35: CPT | Performed by: INTERNAL MEDICINE

## 2021-06-13 PROCEDURE — 85025 COMPLETE CBC W/AUTO DIFF WBC: CPT

## 2021-06-13 PROCEDURE — 2580000003 HC RX 258: Performed by: INTERNAL MEDICINE

## 2021-06-13 PROCEDURE — 97530 THERAPEUTIC ACTIVITIES: CPT

## 2021-06-13 PROCEDURE — 99233 SBSQ HOSP IP/OBS HIGH 50: CPT | Performed by: INTERNAL MEDICINE

## 2021-06-13 PROCEDURE — 80048 BASIC METABOLIC PNL TOTAL CA: CPT

## 2021-06-13 PROCEDURE — 6360000002 HC RX W HCPCS: Performed by: INTERNAL MEDICINE

## 2021-06-13 PROCEDURE — 6370000000 HC RX 637 (ALT 250 FOR IP): Performed by: PHYSICIAN ASSISTANT

## 2021-06-13 PROCEDURE — 94640 AIRWAY INHALATION TREATMENT: CPT

## 2021-06-13 PROCEDURE — 94761 N-INVAS EAR/PLS OXIMETRY MLT: CPT

## 2021-06-13 RX ORDER — OXYCODONE HYDROCHLORIDE AND ACETAMINOPHEN 5; 325 MG/1; MG/1
TABLET ORAL
Status: DISPENSED
Start: 2021-06-13 | End: 2021-06-13

## 2021-06-13 RX ADMIN — CEFEPIME 2000 MG: 2 INJECTION, POWDER, FOR SOLUTION INTRAVENOUS at 05:30

## 2021-06-13 RX ADMIN — Medication 5 ML: at 02:05

## 2021-06-13 RX ADMIN — POTASSIUM CHLORIDE 10 MEQ: 750 TABLET, EXTENDED RELEASE ORAL at 20:28

## 2021-06-13 RX ADMIN — POTASSIUM CHLORIDE 10 MEQ: 750 TABLET, EXTENDED RELEASE ORAL at 11:43

## 2021-06-13 RX ADMIN — Medication 10 ML: at 11:44

## 2021-06-13 RX ADMIN — Medication 5 ML: at 17:54

## 2021-06-13 RX ADMIN — SODIUM CHLORIDE 25 ML: 9 INJECTION, SOLUTION INTRAVENOUS at 16:52

## 2021-06-13 RX ADMIN — ACETAMINOPHEN 650 MG: 325 TABLET ORAL at 16:22

## 2021-06-13 RX ADMIN — METHOCARBAMOL TABLETS 500 MG: 500 TABLET, COATED ORAL at 01:59

## 2021-06-13 RX ADMIN — VANCOMYCIN HYDROCHLORIDE 1250 MG: 10 INJECTION, POWDER, LYOPHILIZED, FOR SOLUTION INTRAVENOUS at 17:58

## 2021-06-13 RX ADMIN — OXYCODONE HYDROCHLORIDE AND ACETAMINOPHEN 1 TABLET: 5; 325 TABLET ORAL at 00:41

## 2021-06-13 RX ADMIN — IPRATROPIUM BROMIDE AND ALBUTEROL SULFATE 1 AMPULE: .5; 3 SOLUTION RESPIRATORY (INHALATION) at 19:56

## 2021-06-13 RX ADMIN — IPRATROPIUM BROMIDE AND ALBUTEROL SULFATE 1 AMPULE: .5; 3 SOLUTION RESPIRATORY (INHALATION) at 07:19

## 2021-06-13 RX ADMIN — AZITHROMYCIN 250 MG: 250 TABLET, FILM COATED ORAL at 11:43

## 2021-06-13 RX ADMIN — OXYCODONE HYDROCHLORIDE AND ACETAMINOPHEN 1 TABLET: 5; 325 TABLET ORAL at 06:46

## 2021-06-13 RX ADMIN — LEVOTHYROXINE SODIUM 50 MCG: 50 TABLET ORAL at 05:27

## 2021-06-13 RX ADMIN — SOTALOL HYDROCHLORIDE 80 MG: 80 TABLET ORAL at 20:28

## 2021-06-13 RX ADMIN — CITALOPRAM HYDROBROMIDE 10 MG: 20 TABLET ORAL at 11:43

## 2021-06-13 RX ADMIN — CEFEPIME 2000 MG: 2 INJECTION, POWDER, FOR SOLUTION INTRAVENOUS at 16:55

## 2021-06-13 RX ADMIN — SOTALOL HYDROCHLORIDE 80 MG: 80 TABLET ORAL at 11:37

## 2021-06-13 RX ADMIN — ROSUVASTATIN CALCIUM 10 MG: 10 TABLET, FILM COATED ORAL at 20:28

## 2021-06-13 RX ADMIN — HYDROCHLOROTHIAZIDE 25 MG: 25 TABLET ORAL at 11:37

## 2021-06-13 RX ADMIN — PANTOPRAZOLE SODIUM 40 MG: 40 TABLET, DELAYED RELEASE ORAL at 05:27

## 2021-06-13 ASSESSMENT — PAIN DESCRIPTION - ORIENTATION
ORIENTATION: RIGHT;LEFT
ORIENTATION: LEFT

## 2021-06-13 ASSESSMENT — PAIN DESCRIPTION - LOCATION
LOCATION: ARM;RIB CAGE
LOCATION: ARM

## 2021-06-13 ASSESSMENT — PAIN SCALES - GENERAL
PAINLEVEL_OUTOF10: 4
PAINLEVEL_OUTOF10: 9
PAINLEVEL_OUTOF10: 8
PAINLEVEL_OUTOF10: 7

## 2021-06-13 ASSESSMENT — PAIN DESCRIPTION - PAIN TYPE
TYPE: ACUTE PAIN
TYPE: ACUTE PAIN

## 2021-06-13 NOTE — PROGRESS NOTES
Pulmonary Progress Note  CC: Shortness of breath    Subjective: Shortness of breath    IV line peripheral    EXAM:   /87   Pulse 89   Temp 97.5 °F (36.4 °C) (Oral)   Resp 16   Ht 5' 5\" (1.651 m)   Wt 158 lb (71.7 kg)   SpO2 95%   Breastfeeding No   BMI 26.29 kg/m²  on 2 L  Constitutional: Appears ill  HEENT: no scleral icterus  Neck: No tracheal deviation present. Cardiovascular: Normal heart sounds. Pulmonary/Chest: No wheezes. No rhonchi. No rales. No decreased breath sounds. No accessory muscle usage or stridor. Abdominal: Soft. Musculoskeletal: No cyanosis. No clubbing. Skin: Skin is warm and dry.      Scheduled Meds:   cefepime  2,000 mg Intravenous Q12H    vancomycin  1,250 mg Intravenous Q24H    azithromycin  250 mg Oral Daily    hydroCHLOROthiazide  25 mg Oral Daily    ipratropium-albuterol  1 ampule Inhalation TID    lidocaine  1 patch Transdermal Daily    citalopram  10 mg Oral Daily    levothyroxine  50 mcg Oral QAM AC    sotalol  80 mg Oral BID    rosuvastatin  10 mg Oral Nightly    pantoprazole  40 mg Oral QAM AC    potassium chloride  10 mEq Oral BID    sodium chloride flush  5-40 mL Intravenous 2 times per day     Continuous Infusions:   sodium chloride Stopped (06/13/21 0529)     PRN Meds:  magic (miracle) mouthwash with nystatin, oxyCODONE-acetaminophen, methocarbamol, sodium chloride flush, sodium chloride, ondansetron **OR** ondansetron, polyethylene glycol, acetaminophen **OR** acetaminophen, ipratropium-albuterol    Labs:  CBC:   Recent Labs     06/11/21  0517 06/12/21 0627 06/13/21 0628   WBC 15.0* 21.9* 20.0*   HGB 12.8 13.1 12.5   HCT 38.3 39.3 37.7   MCV 89.1 89.4 88.9    191 198     BMP:   Recent Labs     06/12/21 0627 06/13/21 0628   * 129*   K 3.3* 3.5   CL 94* 95*   CO2 25 23   BUN 10 11   CREATININE <0.5* <0.5*       Cultures:  6/11/2021 sputum NRF  6/9/2021 blood NGTD  6/9/2021 urine NG  6/9/2021 SARS-CoV-2 negative    Films:  CXR 6/12: The cardiac silhouette is stable.  Aortic vascular calcification. Progressive right perihilar and basilar opacification with increased  loculated right pleural effusion and associated volume loss.  New left  perihilar opacification.  Small left pleural effusion.     ASSESSMENT:  ·  Acute respiratory failure with hypoxemia  · HCAP  · Loculated R pleural effusion- concerning for parapneumonic effusion, developing empyema    PLAN:  · Supplemental oxygen to maintain SaO2 >92%; wean as tolerated  · Cefepime and vancomycin day #2, azithromycin day #5 of 5  · U/s guided thoracentesis requested   · Last dose Xarelto was 6/11/21

## 2021-06-13 NOTE — PROGRESS NOTES
Inpatient Physical Therapy Daily Treatment Note    Unit: [facility]  Date:  6/13/2021  Patient Name:    Tanya Dumont  Admitting diagnosis:  Pneumonia [J18.9]  Admit Date:  6/9/2021  Precautions/Restrictions:  Fall risk, Bed/chair alarm, Lines -IV and Supplemental O2 (2 L) and Telemetry      Discharge Recommendations: SNF  DME needs for discharge: defer to facility       Therapy recommendation for EMS Transport: can transport by wheelchair    Therapy recommendations for staff:   Assist of 2 with use of rolling walker (RW) and gait belt for all transfers to/from [facility]  to/from chair  History Of Present Illness:     Per Allison: \"The patient is a 80 y.o. female with a-fib, depression, RLS, hypothyroidism who presents to [facility] with c/o left shoulder and back pain.  Ongoing for a week.  She saw her PCP.  Her pain was thought to be musculoskeletal in nature.  She was given steroids.  She states the pain continued and she went to the ED for evaluation. Scar Erickson did an X-ray.  She was given a muscle relaxer and discharged home.  She reports having a fever one night.  She has right pleuritic chest pain.  It is located to area under right breast and wraps around to her back.  The pain is worsened by taking a deep breath, movement, or coughing.  She has tried some pain medications, but is unable to tolerate codeine.  This caused her some nausea and vomiting.  She reports poor PO intake the last few days.       She is on 2 L O2. Labs with hypokalemia, hypomagnesemia, hyponatremia, and leukocytosis.  CT chest negative for PE and shows Right lower lobe pneumonia, small para-pneumonic effusion and reactive right hilar/mediastinal adenopathy. Admitted to med-surg.  \"   PMH: A.fib,Depression,HTN,RLS, Thyroid Disease,Urinary bladder disorder  Chest CT-negative for PE, RLL pneumonia with small parapneumonic effusion and reactive right hilar mediastinal ademopathy    Home Health S4 Level Recommendation: NA  AM-PAC Mobility Score AM-PAC Inpatient Mobility Raw Score : [address] scored a 13/24 on the AM-PAC short mobility form. Current research shows that an AM-PAC score of 17 or less is typically not associated with a discharge to the patient's home setting. If patient discharges prior to next session this note will serve as a discharge summary. Please see below for the latest assessment towards goals. Treatment Time:  15:40-16:15  Treatment number: 2  Timed Code Treatment Minutes:35  minutes  Total Treatment Minutes:  35 minutes    Cognition    A&O orientation not directly assessed. Able to follow 1 step commands   Appears intermittantly sleepy and SOB  Subjective  Patient sitting EOB with no family present   Pt agreeable to this PT tx. Cleared by Nurse for activity   Requesting to use BSC  Pain   Yes  Location: R  Anterolateral ribcage  Rating:    moderate/10  Pain Medicine Status: RN notified   Patient CAMPOS Bello currently involved in an emergency,therefore reported patients pain level to CAMPOS Martinez Mobility   Supine to Sit:    Not Tested  Sit to Supine: Mod A   Rolling:   CGA  Scooting: Max A  and 2 persons    Transfer Training     Sit to stand:   Min A  and 2 persons  Stand to sit:   Min A  and 2 persons  Bed to [facility]:   Min A  and 2 persons with use of gait belt and rolling walker (RW). Initially unsteady ,improved BSC to bed     Gait Training gait deferred due to lethargy; pt ambulated 0 ft. Stair Training deferred, pt unsafe/not appropriate to complete stairs at this time    Therapeutic Exercise Rosa deferred secondary to pt fatigued      Balance  Sitting:  Good ; Supervision  Comments: forward leaning on tray    Standing: Fair ; Min A   Comments: using RW, eyes intermittantly closed    Patient Education      Role of PT, POC, Discharge recommendations, safety awareness, transfer techniques and calling for assist with mobility.      Positioning Needs       Pt in bed, alarm set, positioned in proper neutral alignment and pressure relief provided. Call light provided and all needs within reach  Patient requested and provided with ice , applied to R lateral rib area    Activity Tolerance   Pt completed therapy session with SOB noted with rest,activity. HR 88  Spo2 92-93% wearing 2 L NP  Other    Assessment :  Patients progress was limited by pain , SOB and fatigue. Recommending continued skilled PT while in acute care to maximize functional mobility. Recommending SNF upon discharge as patient functioning well below baseline, demonstrates good rehab potential and unable to return home due to burden of care beyond caregiver ability, home environment not conducive to patient recovery and limited safety awareness. Goals : To be met in 3 visits:  1). Independent with LE Ex x 10 reps  2.) Bed to chair: SBA     To be met in 6 visits:  1). Supine to/from sit: Independent  2). Sit to/from stand: SBA  3). Bed to chair: Supervision  4). Gait: Ambulate 125 ft.   with  CGA and use of LRAD (least restrictive assistive device)  5). Tolerate B LE exercises 3 sets of 10-15 reps    Plan   Continue with plan of care. Signature: Ramu Segura, PT #[id number]    If patient discharges from this facility prior to next visit, this note will serve as the Discharge Summary.

## 2021-06-13 NOTE — PLAN OF CARE
Problem: Falls - Risk of:  Goal: Will remain free from falls  Description: Will remain free from falls  6/12/2021 2331 by Zo Mason RN  Outcome: Ongoing     Problem: Pain:  Goal: Pain level will decrease  Description: Pain level will decrease  6/13/2021 1001 by Jason Larios RN  Outcome: Ongoing  6/12/2021 2331 by Zo Mason RN  Outcome: Ongoing  Goal: Control of acute pain  Description: Control of acute pain  6/13/2021 1001 by Jason Larios RN  Outcome: Ongoing  6/12/2021 2331 by oZ Mason RN  Outcome: Ongoing  Goal: Control of chronic pain  Description: Control of chronic pain  Outcome: Ongoing     Problem: Daily Care:  Goal: Daily care needs are met  Description: Daily care needs are met  6/12/2021 2331 by Zo Mason RN  Outcome: Ongoing     Problem: Discharge Planning:  Goal: Patients continuum of care needs are met  Description: Patients continuum of care needs are met  6/12/2021 2331 by Zo Mason RN  Outcome: Ongoing

## 2021-06-13 NOTE — PROGRESS NOTES
polyethylene glycol, acetaminophen **OR** acetaminophen, ipratropium-albuterol    Lab Data:  Recent Labs     06/11/21  0517 06/12/21  0627 06/13/21  0628   WBC 15.0* 21.9* 20.0*   HGB 12.8 13.1 12.5   HCT 38.3 39.3 37.7   MCV 89.1 89.4 88.9    191 198     Recent Labs     06/12/21  0627 06/13/21  0628   * 129*   K 3.3* 3.5   CL 94* 95*   CO2 25 23   BUN 10 11   CREATININE <0.5* <0.5*     No results for input(s): CKTOTAL, CKMB, CKMBINDEX, TROPONINI in the last 72 hours. Coagulation:   Lab Results   Component Value Date    INR 2.74 12/10/2020    APTT 48.2 12/10/2020     Cardiac markers:   Lab Results   Component Value Date    TROPONINI <0.01 06/09/2021         Lab Results   Component Value Date    ALT 12 06/09/2021    AST 12 (L) 06/09/2021    ALKPHOS 97 06/09/2021    BILITOT 1.5 (H) 06/09/2021       Lab Results   Component Value Date    INR 2.74 (H) 12/10/2020    INR 1.84 (H) 07/01/2020    INR 1.31 (H) 10/17/2014    PROTIME 30.9 (H) 12/10/2020    PROTIME 21.0 (H) 07/01/2020    PROTIME 14.4 (H) 10/17/2014         CULTURES  COVID: neg   Sputum: pending  Blood: pending     EKG:  I have reviewed the EKG with the following interpretation:   Atrial fibrillation with rapid ventricular response with premature ventricular or aberrantly conducted complexes, Left axis deviation, Anterolateral infarct (cited on or before 02-JAN-2006)     RADIOLOGY  CT CHEST PULMONARY EMBOLISM W CONTRAST   Final Result   1. Negative for pulmonary embolus. 2. Right lower lobe pneumonia with small parapneumonic effusion and reactive   right hilar/mediastinal adenopathy.              Left humerus 6/7/2021  No acute osseous abnormality.     EKG - rapid Afibb    CXR 6/12    Progressive right perihilar and basilar opacification with volume loss. Increased loculated right pleural effusion. New left perihilar opacification concerning for pneumonia.    ASSESSMENT/PLAN:    Acute hypoxic respiratory failure  - due to pneumonia  - was requiring 2-3 L O2  - wean as tolerated.      Pneumonia, gram positive organism  - Treat with Rocephin, Zithromax D#4  - Duonebs   - oxygen support, sputum and blood cx remain neg   - SLP eval done  No aspiration symptoms   wean oxygen as able   Worsening leukocytosis today. Repeat chest x-ray -worsening opacification. Increased loculated right pleural effusion. New left perihilar opacification. Antibiotics changed to cefepime, vancomycin and azithromycin day2  Hold Xarelto today for thoracentesis tomorrow    Pleuritic chest pain right   - likely with pna, neg for PE, prn Toradol, Percocet prn    Left scapular pain- ct chest  neg . Local lidocaine patch   likely cervical strain      Leukocytosis  - due to pneumonia , remains high , no fevers, cx remain neg    Worsening leucocytosis       COPD  - stable. No AE  - on Fluticasone, Anoro ellipta.      Hyponatremia  - likely due to fluid volume depletion  - improved with IVF- IVF dced  Start 1500 ml FR      Hypokalemia  Hypomagnesemia   - replaced electrolytes. Improved  - monitor labs     Hypothyroidism  - home dose of synthroid 50 mcg      Paroxysmal A-fib  - rates controlled  - continue Sotalol  - AC: Xarelto-hold for procedure     Hypertension  - BP stable. Held HCTZ due to hyponatremia- resume      GERD  - on PPI.      Hyperlipidemia  - on Crestor     Depression  - on Celexa     DVT Prophylaxis:  Xarelto held. SCDs. Diet: ADULT DIET;  Regular  Code Status: Full Code         Olimpia Garcia MD, MD 6/13/2021 9:04 AM

## 2021-06-13 NOTE — PROGRESS NOTES
/87   Pulse 89   Temp 97.5 °F (36.4 °C) (Oral)   Resp 16   Ht 5' 5\" (1.651 m)   Wt 158 lb (71.7 kg)   SpO2 95%   Breastfeeding No   BMI 26.29 kg/m²      Assessment complete. Meds passed. Pt denies needs at this time  Pt in bed. PT showed ability to touch nose, and the opposite shoulder witht the dominate hand. This shows the patients ability to feed herself. This was also shown to the family that was concerned about the patients ability to care for herself. RN explained to family that the patient needs to do as much as she can for herself. Bedside Mobility Assessment Tool (BMAT):     Assessment Level 1- Sit and Shake    1. From a semi-reclined position, ask patient to sit up and rotate to a seated position at the side of the bed. Can use the bedrail. 2. Ask patient to reach out and grab your hand and shake making sure patient reaches across his/her midline. Pass- Patient is able to come to a seated position, maintain core strength. Maintains seated balance while reaching across midline. Move on to Assessment Level 2. Assessment Level 2- Stretch and Point   1. With patient in seated position at the side of the bed, have patient place both feet on the floor (or stool) with knees no higher than hips. 2. Ask patient to stretch one leg and straighten the knee, then bend the ankle/flex and point the toes. If appropriate, repeat with the other leg. Pass- Patient is able to demonstrate appropriate quad strength on intended weight bearing limb(s). Move onto Assessment Level 3. Assessment Level 3- Stand   1. Ask patient to elevate off the bed or chair (seated to standing) using an assistive device (cane, bedrail). 2. Patient should be able to raise buttocks off be and hold for a count of five. May repeat once. Pass- Patient maintains standing stability for at least 5 seconds, proceed to assessment level 4. Assessment Level 4- Walk   1. Ask patient to march in place at bedside. 2. Then ask patient to advance step and return each foot. Some medical conditions may render a patient from stepping backwards, use your best clinical judgement. Fail- Patient not able to complete tasks OR requires use of assistive device. Patient is MOBILITY LEVEL 3. Mobility Level- 3   Assist x2 up. Pt wont open eyes unless cued to do so.

## 2021-06-13 NOTE — PROGRESS NOTES
RESPIRATORY THERAPY ASSESSMENT    Name:  Josef Shaikh  Medical Record Number:  [id number]  Age: [de-identified] y.o. Gender: female  : 1940  Today's Date:  2021  Room:  [address] [place] [address]-    Assessment     Is the patient being admitted for a COPD or Asthma exacerbation? No   (If yes the patient will be seen every 4 hours for the first 24 hours and then reassessed)    Patient Admission Diagnosis      Allergies  No Known Allergies    Minimum Predicted Vital Capacity:               Actual Vital Capacity:                    Pulmonary History:COPD  Home Oxygen Therapy:  room air  Home Respiratory Therapy:Fluticasone Furoate  and Umeclidinium Bromide/Vilanterol   Current Respiratory Therapy:  Duoneb 4  daily  Treatment Type: HHN  Medications: Albuterol/Ipratropium    Respiratory Severity Index(RSI)   Patients with orders for inhalation medications, oxygen, or any therapeutic treatment modality will be placed on Respiratory Protocol. They will be assessed with the first treatment and at least every 72 hours thereafter. The following severity scale will be used to determine frequency of treatment intervention.     Smoking History: Pulmonary Disease or Smoking History, Greater than 15 pack year = 2    Social History  Social History     Tobacco Use    Smoking status: Former Smoker     Types: Cigarettes     Quit date: 2000     Years since quittin.5    Smokeless tobacco: Never Used   Substance Use Topics    Alcohol use: No    Drug use: No       Recent Surgical History: None = 0  Past Surgical History  Past Surgical History:   Procedure Laterality Date    APPENDECTOMY      CYST REMOVAL      HYSTERECTOMY         Level of Consciousness: Alert, Oriented, and Cooperative = 0    Level of Activity: Walking with assistance = 1    Respiratory Pattern: Regular Pattern; RR 8-20 = 0    Breath Sounds: Diminished unilaterally = 1    Sputum   ,  , Sputum How Obtained: Spontaneous cough  Cough: Strong, spontaneous, uncooperative          b. Patients treated with HHN at Home        c. Unable to demonstrate proper use of MDI with spacer     d. RR > 30 bpm   5. Bronchodilators will be delivered via Metered Dose Inhaler (MDI), HHN, Aerogen to intubated patients on mechanical ventilation. 6. Inhalation medication orders will be delivered and/or substituted as outlined below. Aerosolized Medications Ordering and Administration Guidelines:    1. All Medications will be ordered by a physician, and their frequency and/or modality will be adjusted as defined by the patients Respiratory Severity Index (RSI) score. 2. If the patient does not have documented COPD, consider discontinuing anticholinergics when RSI is less than 9.  3. If the bronchospasm worsens (increased RSI), then the bronchodilator frequency can be increased to a maximum of every 4 hours. If greater than every 4 hours is required, the physician will be contacted. 4. If the bronchospasm improves, the frequency of the bronchodilator can be decreased, based on the patient's RSI, but not less than home treatment regimen frequency. 5. Bronchodilator(s) will be discontinued if patient has a RSI less than 9 and has received no scheduled or as needed treatment for 72  Hrs. Patients Ordered on a Mucolytic Agent:    1. Must always be administered with a bronchodilator. 2. Discontinue if patient experiences worsened bronchospasm, or secretions have lessened to the point that the patient is able to clear them with a cough. Anti-inflammatory and Combination Medications:    1. If the patient lacks prior history of lung disease, is not using inhaled anti-inflammatory medication at home, and lacks wheezing by examination or by history for at least 24 hours, contact physician for possible discontinuation.

## 2021-06-13 NOTE — PROGRESS NOTES
MS-DRG Training Guide and   Quick Reference Guide:  pO2 < 60 mmHg or SpO2 (pulse oximetry) < 91% breathing room air  pCO2 > 50 and pH < 7.35  P/F ratio (pO2 / FIO2) < 300  pO2 decrease or pCO2 increase by 10 mmHg from baseline (if known)  Supplemental oxygen of 40% or more  Presence of respiratory distress, tachypnea, dyspnea, shortness of breath,   wheezing  Unable to speak in complete sentences  Use of accessory muscles to breathe  Extreme anxiety and feeling of impending doom  Tripod position  Confusion/altered mental status/obtunded    Thank you for your assistance,  Ava Lozoya RN,BSN,CCDS,CRCR  Options provided:  -- Acute Respiratory Failure as evidenced by, Please document evidence. -- Acute Respiratory Failure ruled out after study  -- Other - I will add my own diagnosis  -- Disagree - Not applicable / Not valid  -- Disagree - Clinically unable to determine / Unknown  -- Refer to Clinical Documentation Reviewer    PROVIDER RESPONSE TEXT:    This patient is in acute respiratory failure as evidenced by Shortness of   breath, increase respiratory rate and low oxygen saturation.     Query created by: Chris Flowers on 6/10/2021 8:37 AM      Electronically signed by:  Donny Raya MD 6/13/2021 9:14 AM

## 2021-06-13 NOTE — PROGRESS NOTES
Physician Progress Note      PATIENT:               Maile Abbasi  CSN #:                  [id number]  :                       1940  ADMIT DATE:       2021 5:30 AM  DISCH DATE:  RESPONDING  PROVIDER #:        Liliana Barney MD          QUERY TEXT:    Patient admitted with pneumonia. Noted documentation of acute respiratory   failure in the h/p. In order to support the diagnosis of acute respiratory   failure, please include additional clinical indicators in your documentation. Or please document if the diagnosis of acute respiratory failure has been   ruled out after further study. The medical record reflects the following:  Risk Factors: pneumonia  Clinical Indicators: requiring oxygen, respiration range 16-18, no   documentation of respiratory distress or increased work of breathing, no   accessory muscle use, No abg  Treatment: oxygen 2L NC, duonebs, antibiotics for infection    Acute Respiratory Failure Clinical Indicators per 3[facility] MS-DRG Training Guide and   Quick Reference Guide:  pO2 < 60 mmHg or SpO2 (pulse oximetry) < 91% breathing room air  pCO2 > 50 and pH < 7.35  P/F ratio (pO2 / FIO2) < 300  pO2 decrease or pCO2 increase by 10 mmHg from baseline (if known)  Supplemental oxygen of 40% or more  Presence of respiratory distress, tachypnea, dyspnea, shortness of breath,   wheezing  Unable to speak in complete sentences  Use of accessory muscles to breathe  Extreme anxiety and feeling of impending doom  Tripod position  Confusion/altered mental status/obtunded    Thank you for your assistance,  Richard Garland RN,BSN,CCDS,CRCR  Options provided:  -- Acute Respiratory Failure as evidenced by, Please document evidence.   -- Acute Respiratory Failure ruled out after study  -- Other - I will add my own diagnosis  -- Disagree - Not applicable / Not valid  -- Disagree - Clinically unable to determine / Unknown  -- Refer to Clinical Documentation Reviewer    PROVIDER RESPONSE TEXT: This patient is in acute respiratory failure as evidenced by Increased   respiratory rate, hypoxia    Query created by: Nazario Franklin on 6/10/2021 8:37 AM      Electronically signed by:  Martha Schafer MD 6/13/2021 9:20 AM

## 2021-06-13 NOTE — PROGRESS NOTES
Pt was sleeping Lab came in to draw AM labs and pt sat up on the side of the bed herself setting of the bed alarm. Pt in pain at this time. PRN Percocet and ice pack placed. Pt repositioned in bed.  Call light within reach and bed alarm on

## 2021-06-13 NOTE — FLOWSHEET NOTE
06/12/21 2002   Vital Signs   Temp 97.2 °F (36.2 °C)   Temp Source Oral   Pulse 83   Heart Rate Source Monitor   Resp 18   /86   BP Location Left upper arm   Patient Position Semi fowlers   Level of Consciousness Alert (0)   MEWS Score 1   Oxygen Therapy   SpO2 93 %   O2 Device Nasal cannula   O2 Flow Rate (L/min) 2 L/min   Pt A/O x 4 assessment completed. Meds given whole in apple sauce pt tolerated well. Ice pack and pillow placed under pts left arm. Pt denies any needs.  Call light within reach and bed alarm on

## 2021-06-13 NOTE — PROGRESS NOTES
Bedside report given to [facility] RN  pt in stable condition no needs at this time.  Call light within reach

## 2021-06-14 ENCOUNTER — APPOINTMENT (OUTPATIENT)
Dept: ULTRASOUND IMAGING | Age: 81
DRG: 177 | End: 2021-06-14
Payer: MEDICARE

## 2021-06-14 LAB
ALBUMIN SERPL-MCNC: 2.2 G/DL (ref 3.4–5)
ALP BLD-CCNC: 125 U/L (ref 40–129)
ALT SERPL-CCNC: 23 U/L (ref 10–40)
ANION GAP SERPL CALCULATED.3IONS-SCNC: 11 MMOL/L (ref 3–16)
ANISOCYTOSIS: ABNORMAL
AST SERPL-CCNC: 23 U/L (ref 15–37)
BANDED NEUTROPHILS RELATIVE PERCENT: 1 % (ref 0–7)
BASOPHILS ABSOLUTE: 0 K/UL (ref 0–0.2)
BASOPHILS RELATIVE PERCENT: 0 %
BILIRUB SERPL-MCNC: 0.9 MG/DL (ref 0–1)
BILIRUBIN DIRECT: 0.3 MG/DL (ref 0–0.3)
BILIRUBIN, INDIRECT: 0.6 MG/DL (ref 0–1)
BLOOD CULTURE, ROUTINE: NORMAL
BUN BLDV-MCNC: 10 MG/DL (ref 7–20)
CALCIUM SERPL-MCNC: 8.6 MG/DL (ref 8.3–10.6)
CHLORIDE BLD-SCNC: 93 MMOL/L (ref 99–110)
CO2: 26 MMOL/L (ref 21–32)
CREAT SERPL-MCNC: <0.5 MG/DL (ref 0.6–1.2)
CULTURE, BLOOD 2: NORMAL
EOSINOPHILS ABSOLUTE: 0 K/UL (ref 0–0.6)
EOSINOPHILS RELATIVE PERCENT: 0 %
GFR AFRICAN AMERICAN: >60
GFR NON-AFRICAN AMERICAN: >60
GLUCOSE BLD-MCNC: 100 MG/DL (ref 70–99)
HCT VFR BLD CALC: 34.7 % (ref 36–48)
HEMOGLOBIN: 11.5 G/DL (ref 12–16)
INR BLD: 1.37 (ref 0.86–1.14)
LACTATE DEHYDROGENASE: 284 U/L (ref 100–190)
LYMPHOCYTES ABSOLUTE: 0.9 K/UL (ref 1–5.1)
LYMPHOCYTES RELATIVE PERCENT: 5 %
MAGNESIUM: 1.9 MG/DL (ref 1.8–2.4)
MCH RBC QN AUTO: 29.7 PG (ref 26–34)
MCHC RBC AUTO-ENTMCNC: 33.2 G/DL (ref 31–36)
MCV RBC AUTO: 89.3 FL (ref 80–100)
MONOCYTES ABSOLUTE: 0.9 K/UL (ref 0–1.3)
MONOCYTES RELATIVE PERCENT: 5 %
NEUTROPHILS ABSOLUTE: 16 K/UL (ref 1.7–7.7)
NEUTROPHILS RELATIVE PERCENT: 89 %
PDW BLD-RTO: 16.1 % (ref 12.4–15.4)
PLATELET # BLD: 212 K/UL (ref 135–450)
PLATELET SLIDE REVIEW: ADEQUATE
PMV BLD AUTO: 8.2 FL (ref 5–10.5)
POTASSIUM REFLEX MAGNESIUM: 3.2 MMOL/L (ref 3.5–5.1)
PROTHROMBIN TIME: 16 SEC (ref 10–13.2)
RBC # BLD: 3.89 M/UL (ref 4–5.2)
SLIDE REVIEW: ABNORMAL
SODIUM BLD-SCNC: 130 MMOL/L (ref 136–145)
TOTAL PROTEIN: 5.5 G/DL (ref 6.4–8.2)
VANCOMYCIN TROUGH: 6.6 UG/ML (ref 10–20)
WBC # BLD: 17.8 K/UL (ref 4–11)

## 2021-06-14 PROCEDURE — 6370000000 HC RX 637 (ALT 250 FOR IP): Performed by: PHYSICIAN ASSISTANT

## 2021-06-14 PROCEDURE — 92526 ORAL FUNCTION THERAPY: CPT

## 2021-06-14 PROCEDURE — 80048 BASIC METABOLIC PNL TOTAL CA: CPT

## 2021-06-14 PROCEDURE — 2580000003 HC RX 258: Performed by: INTERNAL MEDICINE

## 2021-06-14 PROCEDURE — 97530 THERAPEUTIC ACTIVITIES: CPT

## 2021-06-14 PROCEDURE — 76604 US EXAM CHEST: CPT

## 2021-06-14 PROCEDURE — 94761 N-INVAS EAR/PLS OXIMETRY MLT: CPT

## 2021-06-14 PROCEDURE — 85025 COMPLETE CBC W/AUTO DIFF WBC: CPT

## 2021-06-14 PROCEDURE — 6360000002 HC RX W HCPCS: Performed by: INTERNAL MEDICINE

## 2021-06-14 PROCEDURE — 80076 HEPATIC FUNCTION PANEL: CPT

## 2021-06-14 PROCEDURE — 99232 SBSQ HOSP IP/OBS MODERATE 35: CPT | Performed by: INTERNAL MEDICINE

## 2021-06-14 PROCEDURE — 2580000003 HC RX 258: Performed by: PHYSICIAN ASSISTANT

## 2021-06-14 PROCEDURE — 6370000000 HC RX 637 (ALT 250 FOR IP): Performed by: INTERNAL MEDICINE

## 2021-06-14 PROCEDURE — 85610 PROTHROMBIN TIME: CPT

## 2021-06-14 PROCEDURE — 36415 COLL VENOUS BLD VENIPUNCTURE: CPT

## 2021-06-14 PROCEDURE — 83615 LACTATE (LD) (LDH) ENZYME: CPT

## 2021-06-14 PROCEDURE — 80202 ASSAY OF VANCOMYCIN: CPT

## 2021-06-14 PROCEDURE — 2700000000 HC OXYGEN THERAPY PER DAY

## 2021-06-14 PROCEDURE — 94640 AIRWAY INHALATION TREATMENT: CPT

## 2021-06-14 PROCEDURE — 97535 SELF CARE MNGMENT TRAINING: CPT

## 2021-06-14 PROCEDURE — 83735 ASSAY OF MAGNESIUM: CPT

## 2021-06-14 PROCEDURE — 1200000000 HC SEMI PRIVATE

## 2021-06-14 RX ORDER — AMIODARONE HYDROCHLORIDE 200 MG/1
200 TABLET ORAL EVERY OTHER DAY
COMMUNITY

## 2021-06-14 RX ORDER — HYDROCODONE BITARTRATE AND ACETAMINOPHEN 5; 325 MG/1; MG/1
1 TABLET ORAL EVERY 6 HOURS PRN
Status: ON HOLD | COMMUNITY
Start: 2021-06-07 | End: 2021-06-16 | Stop reason: HOSPADM

## 2021-06-14 RX ORDER — TRAMADOL HYDROCHLORIDE 50 MG/1
25 TABLET ORAL EVERY 6 HOURS PRN
Status: DISCONTINUED | OUTPATIENT
Start: 2021-06-14 | End: 2021-06-16 | Stop reason: HOSPADM

## 2021-06-14 RX ORDER — AMIODARONE HYDROCHLORIDE 200 MG/1
200 TABLET ORAL EVERY OTHER DAY
Status: DISCONTINUED | OUTPATIENT
Start: 2021-06-15 | End: 2021-06-16 | Stop reason: HOSPADM

## 2021-06-14 RX ORDER — METHOCARBAMOL 500 MG/1
500 TABLET, FILM COATED ORAL 3 TIMES DAILY
COMMUNITY

## 2021-06-14 RX ORDER — MONTELUKAST SODIUM 10 MG/1
10 TABLET ORAL NIGHTLY
COMMUNITY
Start: 2021-04-07

## 2021-06-14 RX ORDER — PREDNISONE 10 MG/1
10 TABLET ORAL DAILY
Status: ON HOLD | COMMUNITY
Start: 2021-06-01 | End: 2021-06-16 | Stop reason: HOSPADM

## 2021-06-14 RX ORDER — SOTALOL HYDROCHLORIDE 80 MG/1
40 TABLET ORAL 2 TIMES DAILY
Status: DISCONTINUED | OUTPATIENT
Start: 2021-06-14 | End: 2021-06-14

## 2021-06-14 RX ORDER — AMIODARONE HYDROCHLORIDE 200 MG/1
200 TABLET ORAL DAILY
Status: DISCONTINUED | OUTPATIENT
Start: 2021-06-14 | End: 2021-06-14

## 2021-06-14 RX ADMIN — IPRATROPIUM BROMIDE AND ALBUTEROL SULFATE 1 AMPULE: .5; 3 SOLUTION RESPIRATORY (INHALATION) at 15:05

## 2021-06-14 RX ADMIN — AZITHROMYCIN 250 MG: 250 TABLET, FILM COATED ORAL at 09:16

## 2021-06-14 RX ADMIN — Medication 10 ML: at 20:45

## 2021-06-14 RX ADMIN — ROSUVASTATIN CALCIUM 10 MG: 10 TABLET, FILM COATED ORAL at 20:45

## 2021-06-14 RX ADMIN — OXYCODONE HYDROCHLORIDE AND ACETAMINOPHEN 1 TABLET: 5; 325 TABLET ORAL at 01:45

## 2021-06-14 RX ADMIN — HYDROCHLOROTHIAZIDE 25 MG: 25 TABLET ORAL at 09:16

## 2021-06-14 RX ADMIN — CEFEPIME 2000 MG: 2 INJECTION, POWDER, FOR SOLUTION INTRAVENOUS at 18:14

## 2021-06-14 RX ADMIN — PANTOPRAZOLE SODIUM 40 MG: 40 TABLET, DELAYED RELEASE ORAL at 06:12

## 2021-06-14 RX ADMIN — LEVOTHYROXINE SODIUM 50 MCG: 50 TABLET ORAL at 06:12

## 2021-06-14 RX ADMIN — METHOCARBAMOL TABLETS 500 MG: 500 TABLET, COATED ORAL at 20:45

## 2021-06-14 RX ADMIN — CEFEPIME 2000 MG: 2 INJECTION, POWDER, FOR SOLUTION INTRAVENOUS at 06:08

## 2021-06-14 RX ADMIN — Medication 5 ML: at 12:21

## 2021-06-14 RX ADMIN — POTASSIUM CHLORIDE 10 MEQ: 750 TABLET, EXTENDED RELEASE ORAL at 09:16

## 2021-06-14 RX ADMIN — IPRATROPIUM BROMIDE AND ALBUTEROL SULFATE 1 AMPULE: .5; 3 SOLUTION RESPIRATORY (INHALATION) at 19:15

## 2021-06-14 RX ADMIN — VANCOMYCIN HYDROCHLORIDE 1250 MG: 10 INJECTION, POWDER, LYOPHILIZED, FOR SOLUTION INTRAVENOUS at 16:26

## 2021-06-14 RX ADMIN — TRAMADOL HYDROCHLORIDE 25 MG: 50 TABLET ORAL at 17:55

## 2021-06-14 RX ADMIN — SOTALOL HYDROCHLORIDE 80 MG: 80 TABLET ORAL at 09:16

## 2021-06-14 RX ADMIN — POTASSIUM CHLORIDE 10 MEQ: 750 TABLET, EXTENDED RELEASE ORAL at 20:45

## 2021-06-14 RX ADMIN — Medication 10 ML: at 09:17

## 2021-06-14 RX ADMIN — RIVAROXABAN 20 MG: 20 TABLET, FILM COATED ORAL at 17:36

## 2021-06-14 RX ADMIN — SODIUM CHLORIDE 25 ML: 9 INJECTION, SOLUTION INTRAVENOUS at 16:23

## 2021-06-14 RX ADMIN — IPRATROPIUM BROMIDE AND ALBUTEROL SULFATE 1 AMPULE: .5; 3 SOLUTION RESPIRATORY (INHALATION) at 07:16

## 2021-06-14 RX ADMIN — OXYCODONE HYDROCHLORIDE AND ACETAMINOPHEN 1 TABLET: 5; 325 TABLET ORAL at 06:20

## 2021-06-14 RX ADMIN — CITALOPRAM HYDROBROMIDE 10 MG: 20 TABLET ORAL at 09:16

## 2021-06-14 ASSESSMENT — PAIN SCALES - GENERAL
PAINLEVEL_OUTOF10: 0
PAINLEVEL_OUTOF10: 7
PAINLEVEL_OUTOF10: 4
PAINLEVEL_OUTOF10: 7
PAINLEVEL_OUTOF10: 8

## 2021-06-14 ASSESSMENT — PAIN DESCRIPTION - PAIN TYPE: TYPE: ACUTE PAIN

## 2021-06-14 ASSESSMENT — PAIN DESCRIPTION - LOCATION: LOCATION: BACK

## 2021-06-14 NOTE — PROGRESS NOTES
Occupational Therapy Daily Treatment Note    Unit: [address] [place]  Date:  6/14/2021  Patient Name:    Vin Lewis  Admitting diagnosis:  Pneumonia [J18.9]  Admit Date:  6/9/2021  Precautions/Restrictions:  Fall risk, Bed/chair alarm, Lines -IV and Supplemental O2 (2L) and Telemetry       Discharge Recommendations: SNF  DME needs for discharge: defer to facility       Therapy recommendations for staff:   Assist of 2 with use of rolling walker (RW) and gait belt for all transfers to/from [facility]  to/from chair    AM-PAC Score: AM-PAC Inpatient Daily Activity Raw Score: 396 Kristel S4 Level: Level 3- Safety if going home       Treatment Time:  [address]  Treatment number:  3  Timed code treatment minutes: 35 minutes  Total treatment minutes:   35 minutes    History of Present Illness: per MERLENE Cooper NP 6/9/21:  \"The patient is a 80 y.o. female with a-fib, depression, RLS, hypothyroidism who presents to [facility] with c/o left shoulder and back pain.  Ongoing for a week.  She saw her PCP.  Her pain was thought to be musculoskeletal in nature.  She was given steroids.  She states the pain continued and she went to the ED for evaluation. Katy Garcia did an X-ray.  She was given a muscle relaxer and discharged home.  She reports having a fever one night.  She has right pleuritic chest pain.  It is located to area under right breast and wraps around to her back.  The pain is worsened by taking a deep breath, movement, or coughing.  She has tried some pain medications, but is unable to tolerate codeine.  This caused her some nausea and vomiting.  She reports poor PO intake the last few days.       She is on 2 L O2. Labs with hypokalemia, hypomagnesemia, hyponatremia, and leukocytosis.  CT chest negative for PE and shows Right lower lobe pneumonia, small para-pneumonic effusion and reactive right hilar/mediastinal adenopathy. Admitted to med-surg. \"    Subjective:  Patient agreeable to therapy with encouragement.     Pain Yes  Rating: severe  Location:LUE and R flank  Pain Medicine Status: Received pain med prior to tx and RN notified      Bed Mobility:   Supine to Sit:  Min A   Sit to Supine:  Not Tested  Rolling:           Not Tested  Scooting:        Not Tested    Transfer Training:   Sit to stand:   CGA  Stand to sit:  CGA  BSC to Chair:  Min A  and with RW and gait belt  Chair to [facility]:   Min A  and with RW and gait belt   Standard toilet:   Not Tested    Activity Tolerance   Pt completed therapy session with Pain noted with all activity and at rest, SOB with exertion; required cues to keep eyes open during session  SpO2 >92% on 2L  HR 51-53, RN notified of lower HR    ADL Training:   Upper body dressing: Not Tested  Upper body bathing:  Not Tested  Lower body dressing:  Not Tested  Lower body bathing:  Not Tested  Toileting:   CGA for stance while patient managed pericare in stance after urinating, required MOD A to change pull up disposable undergarment  Grooming/Hygiene:  Min A  use mouthwash  Eating: Setup assist to cut food, encouragement to try eating    Therapeutic Exercise: NA  N/A-deferred due to pain level, SOB    Patient Education:   Role of OT  Recommendations for DC  Safe RW use/hand placement    Positioning Needs:   Pt in bed, alarm set, positioned in proper neutral alignment and pressure relief provided. Call light provided and all needs within reach    Family Present:  Yes, dtrs    Assessment: Making progress toward goals, limited by pain and SOB. Continue as tolerated. GOALS  To be met in 3 Visits:  1). Bed to toilet/BSC: SBA     To be met in 5 Visits:  1). Supine to/from Sit:             Supervision  2). Upper Body Bathing:         Supervision  3). Lower Body Bathing:         CGA  4). Upper Body Dressing:       Supervision  5). Lower Body Dressing:       CGA  6).  Pt to demonstrate UE exs x 15 reps with minimal cues    Plan: cont with [address] [place], OTR/L 8765        If patient discharges from this facility prior to next visit, this note will serve as the Discharge Summary

## 2021-06-14 NOTE — PROGRESS NOTES
urine NG  6/9/2021 SARS-CoV-2 negative    Films:  CXR 6/12: The cardiac silhouette is stable.  Aortic vascular calcification. Progressive right perihilar and basilar opacification with increased  loculated right pleural effusion and associated volume loss.  New left  perihilar opacification.  Small left pleural effusion.     ASSESSMENT:  ·  Acute respiratory failure with hypoxemia  · HCAP  · Loculated R pleural effusion-too small for thoracentesis on 6/14/21    PLAN:  · Supplemental oxygen to maintain SaO2 >92%; wean as tolerated  · Cefepime and vancomycin day #2, azithromycin day #5 of 5  · Pleural effusion is too small to tap, okay to resume Xarelto

## 2021-06-14 NOTE — CARE COORDINATION
INTERDISCIPLINARY PLAN OF CARE CONFERENCE    Date/Time: 6/14/2021 5:05 PM  Completed by: Demetrice Woodson RN, Case Management      Patient Name:  Joseph Murrieta  YOB: 1940  Admitting Diagnosis: Pneumonia [J18.9]     Admit Date/Time:  6/9/2021  5:30 AM    Chart reviewed. Interdisciplinary team contacted or reviewed plan related to patient progress and discharge plans. Disciplines included Case Management, Nursing, and Dietitian. Current Status: IP   PT/OT recommendation for discharge plan of care: 06/09/2021  Plan: STR at [facility]. +bed when medically ready.  +CM following

## 2021-06-14 NOTE — PROGRESS NOTES
Pharmacy Vancomycin Consult     Vancomycin Day: 3  Current Dosinmg q24h  Current indication: HAP    Temp max:      Recent Labs     21  0628 21  0511   BUN 11 10   CREATININE <0.5* <0.5*   WBC 20.0* 17.8*       Intake/Output Summary (Last 24 hours) at 2021 1629  Last data filed at 2021 1459  Gross per 24 hour   Intake 240 ml   Output --   Net 240 ml     Culture Date      Source                       Results    Ht Readings from Last 1 Encounters:   21 5' 5\" (1.651 m)        Wt Readings from Last 1 Encounters:   21 158 lb (71.7 kg)       Body mass index is 26.29 kg/m². CrCl cannot be calculated (This lab value cannot be used to calculate CrCl because it is not a number: <0.5).     Trough: 6.6    Assessment/Plan:  Will increase Vanc interval to q18h, goal  with estimated trough 11.4  Karie Basurto Pharm D :31 PM  .

## 2021-06-14 NOTE — PROGRESS NOTES
complete tasks OR requires use of assistive device. Patient is MOBILITY LEVEL 3.        Mobility Level- 3

## 2021-06-14 NOTE — PROGRESS NOTES
Speech Language Pathology  Facility/Department: [facility]  Dysphagia Daily Treatment Note    NAME: Cynthia Frias  : 1940  MRN: [id number]    Patient Diagnosis(es):   Patient Active Problem List    Diagnosis Date Noted    Acute respiratory failure with hypoxia ([facility] [address]     Leukocytosis     Pneumonia 2021    Hypokalemia     Hypomagnesemia     Rib pain on right side     Closed displaced fracture of neck of left fifth metacarpal bone 2020    Left arm pain 2020    Atrial fibrillation with rapid ventricular response (HCC)     Hypertension     Restless legs syndrome (RLS)     Thyroid disease     Chest pain 2020    Spinal stenosis of lumbar region 2019    Bilateral sciatica 2019    Primary osteoarthritis of right hip 2019    Greater trochanteric bursitis of right hip 2019     Allergies: No Known Allergies     Subjective: Pt was asleep upon arrival. Pt was agreeable and cooperative with tx. Pain: Pt did not note any pain. Current Diet: ADULT DIET; Regular; 1500 ml    Diet Tolerance:  Patient tolerating current diet level without signs/symptoms of penetration / aspiration. P.O. Trials: Thin   x x4 with small sips only   x2 with bolus hold   x2 with mixed trials of regular / TL   Nectar / Mildly Thick       Honey / Moderately Thick       Pudding / Extremely Thick       Puree       Solid   x x1 with solid only   x2 mixed with thin liquid      Dysphagia Treatment and Impressions:  Observed pt with thin, single, small sips via cup x4 and no s/s of penetration / aspiration observed. Observed mixed trials regular / thin and pt demonstrated a prolonged cough once she took a sip to assist with the mastication of regular on first trial. Taught pt bolus hold strategy for thin and no coughing was observed on PO trails after (mixed and single bites / sips). Pt required min reminders for bolus hold strategy.  Straws observed in pt's room. ST added a visual under diet description in room for \"no straws\" order. Pt complained her tongue hurt d/t \"accidentally biting it\". Sores on tongue observed. Discussed dental soft diet/easy to chew d/t prolonged mastication phase and observed missing teeth. Pt was agreeable to diet change. Dysphagia Goals:  Timeframe for Long-term Goals: 7 days by 06/16/2021  Goal 1: Pt will demonstrate functional swallow of preferred consistencies with use of safe swallow strategies in 5/5 opportunities. 6/14/2021: Addressed and ongoing. Short-term Goals  Timeframe for Short-term Goals: 5 days by 06/14/2021  Goal 1: Pt will participate in instrumental swallowing assessment to assess oropharyngeal swallow function with additional goals to follow as needed. GOAL MET  Goal 2: Pt will demonstrate understanding of safe swallow strategies independently. 6/14/2021: Addressed ongoing swallow strategies. Recommendations:  Solid Consistency: Dental soft / Easy to chew   Liquid Consistency: Thin via cup (strategy: bolus hold ~3 sec)  Medication: Whole with thin. Patient/Family/Caregiver Education: Pt was educated on reasoning for referral, role of ST, and tx results and recommendations. Compensatory Strategies: Sit up for all meals and thereafter for 30 minutes, Eat with small bites (1/2 tsp; 1 tsp), Drink from a cup only with small sips, TL bolus hold for ~3 seconds, No straws. Plan:    Continued Dysphagia treatment with goals per plan of care. Discharge Recommendations: TBD    If pt discharges from hospital prior to Speech/Swallowing discharge, this note serves as tx and discharge summary.      Total Treatment Time / Charges     Time in Time out Total Time / units   Cognitive Tx         Speech Tx      Dysphagia Tx [id number] 30 min / 1 unit      Signature:    [address] [facility] SLP   [id number]      Renny Dalton, SLP

## 2021-06-14 NOTE — PROGRESS NOTES
IM Progress Note    Admit Date:  6/9/2021  5    Interval history:  Right LL pna and hypoxia   Remains on 3 L     Subjective:  Ms. Charity Beckwith says she feels about the same  C/o pain right-sided ribs in the side, left shoulder      Objective:   /77   Pulse 58   Temp 98 °F (36.7 °C) (Oral)   Resp 16   Ht 5' 5\" (1.651 m)   Wt 158 lb (71.7 kg)   SpO2 96%   Breastfeeding No   BMI 26.29 kg/m²       Intake/Output Summary (Last 24 hours) at 6/14/2021 0900  Last data filed at 6/13/2021 0944  Gross per 24 hour   Intake 240 ml   Output --   Net 240 ml       Physical Exam:        General: elderly female up in bed, sick appearing   Awake, alert and oriented. Appears to be not in any distress  Mucous Membranes:  Pink , anicteric  Neck: No JVD, no carotid bruit, no thyromegaly  Chest:  Clear to auscultation bilaterally, diminished in right Lower lobe with crackles   Cardiovascular:  Irregular  S1S2 heard, no murmurs or gallops  Abdomen:  Soft, undistended, non tender, no organomegaly, BS present  Extremities: left scapular pain and left upper arm pain with movements noted  No edema or cyanosis.  Distal pulses well felt  Neurological : grossly normal        Medications:   Scheduled Medications:    cefepime  2,000 mg Intravenous Q12H    vancomycin  1,250 mg Intravenous Q24H    azithromycin  250 mg Oral Daily    hydroCHLOROthiazide  25 mg Oral Daily    ipratropium-albuterol  1 ampule Inhalation TID    lidocaine  1 patch Transdermal Daily    citalopram  10 mg Oral Daily    levothyroxine  50 mcg Oral QAM AC    sotalol  80 mg Oral BID    rosuvastatin  10 mg Oral Nightly    pantoprazole  40 mg Oral QAM AC    potassium chloride  10 mEq Oral BID    sodium chloride flush  5-40 mL Intravenous 2 times per day     I   sodium chloride 25 mL (06/13/21 1652)     magic (miracle) mouthwash with nystatin, oxyCODONE-acetaminophen, methocarbamol, sodium chloride flush, sodium chloride, ondansetron **OR** ondansetron, failure  - due to pneumonia  - was requiring 2-3 L O2  - wean as tolerated.      Pneumonia, gram negative organism POA   - Treat with Rocephin, Zithromax D#4  - Duonebs   - oxygen support, sputum and blood cx remain neg   - SLP eval done  No aspiration symptoms   wean oxygen as able   Worsening leukocytosis today. Repeat chest x-ray -worsening opacification. Increased loculated right pleural effusion. New left perihilar opacification. Antibiotics changed to cefepime, vancomycin day 3  and azithromycin( finished 5 days)  Hold Xarelto for thoracentesis today    Pleuritic chest pain right   - likely with pna, neg for PE, prn Toradol, Percocet prn    Left scapular pain- ct chest  neg . Local lidocaine patch   likely cervical strain      Leukocytosis  - due to pneumonia , remains high , no fevers, cx remain neg    Worsening leucocytosis     COPD  - stable. No AE  - on Fluticasone, Anoro ellipta.      Hyponatremia  - likely due to fluid volume depletion  - improved with IVF- IVF dced  Start 1500 ml FR      Hypokalemia  Hypomagnesemia   - replaced electrolytes. Improved  - monitor labs     Hypothyroidism  - home dose of synthroid 50 mcg      Paroxysmal A-fib  - rates controlled  - continue Sotalol  - AC: Xarelto-hold for procedure     Hypertension  - BP stable. Held HCTZ due to hyponatremia- resume      GERD  - on PPI.      Hyperlipidemia  - on Crestor     Depression  - on Celexa     DVT Prophylaxis:  Xarelto held. SCDs. Diet: ADULT DIET;  Regular  Code Status: Full Code         Josette Dunn MD, MD 6/14/2021 9:00 AM

## 2021-06-14 NOTE — PLAN OF CARE
Problem: Falls - Risk of:  Goal: Will remain free from falls  Description: Will remain free from falls  6/14/2021 0528 by Aura Rodríguez RN  Outcome: Ongoing  Goal: Absence of physical injury  Description: Absence of physical injury  6/14/2021 1156 by Duc Verdugo RN  Outcome: Ongoing  6/14/2021 0528 by Aura Rodríguez RN  Outcome: Ongoing     Problem: Pain:  Goal: Pain level will decrease  Description: Pain level will decrease  Outcome: Ongoing  Goal: Control of acute pain  Description: Control of acute pain  Outcome: Ongoing

## 2021-06-15 LAB
ANION GAP SERPL CALCULATED.3IONS-SCNC: 13 MMOL/L (ref 3–16)
ANISOCYTOSIS: ABNORMAL
BANDED NEUTROPHILS RELATIVE PERCENT: 5 % (ref 0–7)
BASOPHILS ABSOLUTE: 0 K/UL (ref 0–0.2)
BASOPHILS RELATIVE PERCENT: 0 %
BUN BLDV-MCNC: 10 MG/DL (ref 7–20)
CALCIUM SERPL-MCNC: 8.9 MG/DL (ref 8.3–10.6)
CHLORIDE BLD-SCNC: 94 MMOL/L (ref 99–110)
CO2: 26 MMOL/L (ref 21–32)
CREAT SERPL-MCNC: <0.5 MG/DL (ref 0.6–1.2)
EOSINOPHILS ABSOLUTE: 0 K/UL (ref 0–0.6)
EOSINOPHILS RELATIVE PERCENT: 0 %
GFR AFRICAN AMERICAN: >60
GFR NON-AFRICAN AMERICAN: >60
GLUCOSE BLD-MCNC: 100 MG/DL (ref 70–99)
HCT VFR BLD CALC: 36.6 % (ref 36–48)
HEMOGLOBIN: 12.2 G/DL (ref 12–16)
LYMPHOCYTES ABSOLUTE: 1.6 K/UL (ref 1–5.1)
LYMPHOCYTES RELATIVE PERCENT: 9 %
MAGNESIUM: 1.9 MG/DL (ref 1.8–2.4)
MCH RBC QN AUTO: 29.5 PG (ref 26–34)
MCHC RBC AUTO-ENTMCNC: 33.3 G/DL (ref 31–36)
MCV RBC AUTO: 88.6 FL (ref 80–100)
METAMYELOCYTES RELATIVE PERCENT: 1 %
MONOCYTES ABSOLUTE: 1.5 K/UL (ref 0–1.3)
MONOCYTES RELATIVE PERCENT: 8 %
NEUTROPHILS ABSOLUTE: 15.2 K/UL (ref 1.7–7.7)
NEUTROPHILS RELATIVE PERCENT: 77 %
PDW BLD-RTO: 16.2 % (ref 12.4–15.4)
PLATELET # BLD: 258 K/UL (ref 135–450)
PLATELET SLIDE REVIEW: ADEQUATE
PMV BLD AUTO: 8.4 FL (ref 5–10.5)
POIKILOCYTES: ABNORMAL
POTASSIUM REFLEX MAGNESIUM: 3.5 MMOL/L (ref 3.5–5.1)
RBC # BLD: 4.13 M/UL (ref 4–5.2)
SLIDE REVIEW: ABNORMAL
SODIUM BLD-SCNC: 133 MMOL/L (ref 136–145)
WBC # BLD: 18.3 K/UL (ref 4–11)

## 2021-06-15 PROCEDURE — 99232 SBSQ HOSP IP/OBS MODERATE 35: CPT | Performed by: INTERNAL MEDICINE

## 2021-06-15 PROCEDURE — 2580000003 HC RX 258: Performed by: INTERNAL MEDICINE

## 2021-06-15 PROCEDURE — 80048 BASIC METABOLIC PNL TOTAL CA: CPT

## 2021-06-15 PROCEDURE — 36415 COLL VENOUS BLD VENIPUNCTURE: CPT

## 2021-06-15 PROCEDURE — 6370000000 HC RX 637 (ALT 250 FOR IP): Performed by: PHYSICIAN ASSISTANT

## 2021-06-15 PROCEDURE — 94761 N-INVAS EAR/PLS OXIMETRY MLT: CPT

## 2021-06-15 PROCEDURE — 1200000000 HC SEMI PRIVATE

## 2021-06-15 PROCEDURE — 2580000003 HC RX 258: Performed by: PHYSICIAN ASSISTANT

## 2021-06-15 PROCEDURE — 2700000000 HC OXYGEN THERAPY PER DAY

## 2021-06-15 PROCEDURE — 6360000002 HC RX W HCPCS: Performed by: INTERNAL MEDICINE

## 2021-06-15 PROCEDURE — 85025 COMPLETE CBC W/AUTO DIFF WBC: CPT

## 2021-06-15 PROCEDURE — 6370000000 HC RX 637 (ALT 250 FOR IP): Performed by: INTERNAL MEDICINE

## 2021-06-15 PROCEDURE — 94640 AIRWAY INHALATION TREATMENT: CPT

## 2021-06-15 PROCEDURE — 83735 ASSAY OF MAGNESIUM: CPT

## 2021-06-15 RX ORDER — BENZONATATE 100 MG/1
100 CAPSULE ORAL 3 TIMES DAILY PRN
Status: DISCONTINUED | OUTPATIENT
Start: 2021-06-15 | End: 2021-06-16 | Stop reason: HOSPADM

## 2021-06-15 RX ADMIN — IPRATROPIUM BROMIDE AND ALBUTEROL SULFATE 1 AMPULE: .5; 3 SOLUTION RESPIRATORY (INHALATION) at 07:34

## 2021-06-15 RX ADMIN — IPRATROPIUM BROMIDE AND ALBUTEROL SULFATE 1 AMPULE: .5; 3 SOLUTION RESPIRATORY (INHALATION) at 18:54

## 2021-06-15 RX ADMIN — POTASSIUM CHLORIDE 10 MEQ: 750 TABLET, EXTENDED RELEASE ORAL at 08:32

## 2021-06-15 RX ADMIN — LEVOTHYROXINE SODIUM 50 MCG: 50 TABLET ORAL at 06:28

## 2021-06-15 RX ADMIN — CEFEPIME 2000 MG: 2 INJECTION, POWDER, FOR SOLUTION INTRAVENOUS at 17:27

## 2021-06-15 RX ADMIN — POTASSIUM CHLORIDE 10 MEQ: 750 TABLET, EXTENDED RELEASE ORAL at 20:58

## 2021-06-15 RX ADMIN — VANCOMYCIN HYDROCHLORIDE 1250 MG: 10 INJECTION, POWDER, LYOPHILIZED, FOR SOLUTION INTRAVENOUS at 10:13

## 2021-06-15 RX ADMIN — HYDROCHLOROTHIAZIDE 25 MG: 25 TABLET ORAL at 08:32

## 2021-06-15 RX ADMIN — BENZONATATE 100 MG: 100 CAPSULE ORAL at 10:14

## 2021-06-15 RX ADMIN — ACETAMINOPHEN 650 MG: 325 TABLET ORAL at 17:40

## 2021-06-15 RX ADMIN — RIVAROXABAN 20 MG: 20 TABLET, FILM COATED ORAL at 17:23

## 2021-06-15 RX ADMIN — PANTOPRAZOLE SODIUM 40 MG: 40 TABLET, DELAYED RELEASE ORAL at 06:28

## 2021-06-15 RX ADMIN — BENZONATATE 100 MG: 100 CAPSULE ORAL at 13:56

## 2021-06-15 RX ADMIN — IPRATROPIUM BROMIDE AND ALBUTEROL SULFATE 1 AMPULE: .5; 3 SOLUTION RESPIRATORY (INHALATION) at 13:32

## 2021-06-15 RX ADMIN — Medication 10 ML: at 20:58

## 2021-06-15 RX ADMIN — CEFEPIME 2000 MG: 2 INJECTION, POWDER, FOR SOLUTION INTRAVENOUS at 06:28

## 2021-06-15 RX ADMIN — AMIODARONE HYDROCHLORIDE 200 MG: 200 TABLET ORAL at 08:32

## 2021-06-15 RX ADMIN — Medication 10 ML: at 08:33

## 2021-06-15 RX ADMIN — ROSUVASTATIN CALCIUM 10 MG: 10 TABLET, FILM COATED ORAL at 20:58

## 2021-06-15 RX ADMIN — CITALOPRAM HYDROBROMIDE 10 MG: 20 TABLET ORAL at 08:32

## 2021-06-15 ASSESSMENT — PAIN SCALES - GENERAL
PAINLEVEL_OUTOF10: 0
PAINLEVEL_OUTOF10: 6
PAINLEVEL_OUTOF10: 0
PAINLEVEL_OUTOF10: 0

## 2021-06-15 NOTE — PLAN OF CARE
Problem: Falls - Risk of:  Goal: Will remain free from falls  Description: Will remain free from falls  Outcome: Ongoing  Goal: Absence of physical injury  Description: Absence of physical injury  Outcome: Ongoing     Problem: Pain:  Goal: Pain level will decrease  Description: Pain level will decrease  Outcome: Ongoing  Goal: Control of acute pain  Description: Control of acute pain  Outcome: Ongoing  Goal: Control of chronic pain  Description: Control of chronic pain  Outcome: Ongoing  Goal: Patient's pain/discomfort is manageable  Description: Patient's pain/discomfort is manageable  Outcome: Ongoing     Problem: Infection:  Goal: Will remain free from infection  Description: Will remain free from infection  Outcome: Ongoing     Problem: Safety:  Goal: Free from accidental physical injury  Description: Free from accidental physical injury  Outcome: Ongoing  Goal: Free from intentional harm  Description: Free from intentional harm  Outcome: Ongoing     Problem: Daily Care:  Goal: Daily care needs are met  Description: Daily care needs are met  Outcome: Ongoing     Problem: Skin Integrity:  Goal: Skin integrity will stabilize  Description: Skin integrity will stabilize  Outcome: Ongoing     Problem: Discharge Planning:  Goal: Patients continuum of care needs are met  Description: Patients continuum of care needs are met  Outcome: Ongoing     Problem: Discharge Planning:  Goal: Discharged to appropriate level of care  Description: Discharged to appropriate level of care  Outcome: Ongoing  Goal: Participates in care planning  Description: Participates in care planning  Outcome: Ongoing     Problem: Airway Clearance - Ineffective:  Goal: Clear lung sounds  Description: Clear lung sounds  Outcome: Ongoing  Goal: Ability to maintain a clear airway will improve  Description: Ability to maintain a clear airway will improve  Outcome: Ongoing     Problem: Fluid Volume - Deficit:  Goal: Achieves intake and output within

## 2021-06-15 NOTE — PROGRESS NOTES
IM Progress Note    Admit Date:  6/9/2021  6    Interval history:  Right LL pna and hypoxia   Currently on 2 L of oxygen. Subjective:  MsKobi Pena says she feels about the same  Pleural fluid was too small to tap. Objective:   BP (!) 171/82   Pulse 64   Temp 97.9 °F (36.6 °C) (Oral)   Resp 18   Ht 5' 5\" (1.651 m)   Wt 158 lb (71.7 kg)   SpO2 94%   Breastfeeding No   BMI 26.29 kg/m²       Intake/Output Summary (Last 24 hours) at 6/15/2021 0909  Last data filed at 6/15/2021 0859  Gross per 24 hour   Intake 642.4 ml   Output --   Net 642.4 ml       Physical Exam:        General: elderly female up in bed, sick appearing   Awake, alert and oriented. Appears to be not in any distress  Mucous Membranes:  Pink , anicteric  Neck: No JVD, no carotid bruit, no thyromegaly  Chest:  Clear to auscultation bilaterally, diminished in right Lower lobe with crackles   Cardiovascular:  Irregular  S1S2 heard, no murmurs or gallops  Abdomen:  Soft, undistended, non tender, no organomegaly, BS present  Extremities: left scapular pain and left upper arm pain with movements noted  No edema or cyanosis.  Distal pulses well felt  Neurological : grossly normal        Medications:   Scheduled Medications:    rivaroxaban  20 mg Oral Dinner    vancomycin  1,250 mg Intravenous Q18H    amiodarone  200 mg Oral Every Other Day    cefepime  2,000 mg Intravenous Q12H    hydroCHLOROthiazide  25 mg Oral Daily    ipratropium-albuterol  1 ampule Inhalation TID    lidocaine  1 patch Transdermal Daily    citalopram  10 mg Oral Daily    levothyroxine  50 mcg Oral QAM AC    rosuvastatin  10 mg Oral Nightly    pantoprazole  40 mg Oral QAM AC    potassium chloride  10 mEq Oral BID    sodium chloride flush  5-40 mL Intravenous 2 times per day     I   sodium chloride 25 mL (06/14/21 1623)     traMADol, magic (miracle) mouthwash with nystatin, methocarbamol, sodium chloride flush, sodium chloride, polyethylene glycol, acetaminophen **OR** acetaminophen, ipratropium-albuterol    Lab Data:  Recent Labs     06/13/21  0628 06/14/21  0511 06/15/21  0532   WBC 20.0* 17.8* 18.3*   HGB 12.5 11.5* 12.2   HCT 37.7 34.7* 36.6   MCV 88.9 89.3 88.6    212 258     Recent Labs     06/13/21  0628 06/14/21  0511 06/15/21  0532   * 130* 133*   K 3.5 3.2* 3.5   CL 95* 93* 94*   CO2 23 26 26   BUN 11 10 10   CREATININE <0.5* <0.5* <0.5*     No results for input(s): CKTOTAL, CKMB, CKMBINDEX, TROPONINI in the last 72 hours. Coagulation:   Lab Results   Component Value Date    INR 1.37 06/14/2021    APTT 48.2 12/10/2020     Cardiac markers:   Lab Results   Component Value Date    TROPONINI <0.01 06/09/2021         Lab Results   Component Value Date    ALT 23 06/14/2021    AST 23 06/14/2021    ALKPHOS 125 06/14/2021    BILITOT 0.9 06/14/2021       Lab Results   Component Value Date    INR 1.37 (H) 06/14/2021    INR 2.74 (H) 12/10/2020    INR 1.84 (H) 07/01/2020    PROTIME 16.0 (H) 06/14/2021    PROTIME 30.9 (H) 12/10/2020    PROTIME 21.0 (H) 07/01/2020         CULTURES  COVID: neg   Sputum: pending  Blood: pending     EKG:  I have reviewed the EKG with the following interpretation:   Atrial fibrillation with rapid ventricular response with premature ventricular or aberrantly conducted complexes, Left axis deviation, Anterolateral infarct (cited on or before 02-JAN-2006)     RADIOLOGY  CT CHEST PULMONARY EMBOLISM W CONTRAST   Final Result   1. Negative for pulmonary embolus. 2. Right lower lobe pneumonia with small parapneumonic effusion and reactive   right hilar/mediastinal adenopathy.              Left humerus 6/7/2021  No acute osseous abnormality.     EKG - rapid Afib    CXR 6/12    Progressive right perihilar and basilar opacification with volume loss. Increased loculated right pleural effusion. New left perihilar opacification concerning for pneumonia.    ASSESSMENT/PLAN:    Acute hypoxic respiratory failure  - due to pneumonia  - was requiring 2-3 L O2  - wean as tolerated.      Pneumonia, gram negative organism POA   - Treat with Rocephin, Zithromax D#4  - Duonebs   - oxygen support, sputum and blood cx remain neg   - SLP eval done  No aspiration symptoms   wean oxygen as able   Worsening leukocytosis today. Repeat chest x-ray -worsening opacification. Increased loculated right pleural effusion. New left perihilar opacification. Antibiotics changed to cefepime, vancomycin day 4  and azithromycin( finished 5 days)  Thoracentesis attempted. Pleural fluid was too little to tap. Pleuritic chest pain right   - likely with pna, neg for PE, prn Toradol, Percocet prn    Left scapular pain- ct chest  neg . Local lidocaine patch   likely cervical strain      Leukocytosis  - due to pneumonia , remains high , no fevers, cx remain neg    Persistent  leucocytosis     COPD  - stable. No AE  - on Fluticasone, Anoro ellipta.      Hyponatremia  - likely due to fluid volume depletion  - improved with IVF- IVF dced  Start 1500 ml FR      Hypokalemia  Hypomagnesemia   - replaced electrolytes. Improved  - monitor labs     Hypothyroidism  - home dose of synthroid 50 mcg      Paroxysmal A-fib  - rates controlled  - continue amiodarone and Xarelto     Hypertension  - BP stable. Held HCTZ due to hyponatremia- resume      GERD  - on PPI.      Hyperlipidemia  - on Crestor     Depression  - on Celexa     DVT Prophylaxis:  Xarelto   Diet: ADULT DIET;  Regular  Code Status: Full Code         Matt Davies MD, MD 6/15/2021 9:09 AM

## 2021-06-15 NOTE — PROGRESS NOTES
Physical Therapy    Patient was approached for PT session this PM. Patient had just completed OT session and was fatigued post session. Patient refused to participate in the session and wanted to rest in the bed. Patient will be followed up later as schedule permits. No charge.      Lashanda Duggan, MS PT, # [id number]

## 2021-06-15 NOTE — PROGRESS NOTES
Pulmonary Progress Note  CC: Shortness of breath    Subjective: overall clearly improving     IV line peripheral    EXAM:   /82   Pulse 62   Temp 97.6 °F (36.4 °C) (Oral)   Resp 16   Ht 5' 5\" (1.651 m)   Wt 158 lb (71.7 kg)   SpO2 95%   Breastfeeding No   BMI 26.29 kg/m²  on 2 L  Constitutional: Appears ill  HEENT: no scleral icterus  Neck: No tracheal deviation present. Cardiovascular: Normal heart sounds. Pulmonary/Chest: No wheezes. No rhonchi. No rales. + decreased breath sounds. No accessory muscle usage or stridor. Abdominal: Soft. Musculoskeletal: No cyanosis. No clubbing. Skin: Skin is warm and dry.      Scheduled Meds:   rivaroxaban  20 mg Oral Dinner    vancomycin  1,250 mg Intravenous Q18H    amiodarone  200 mg Oral Every Other Day    cefepime  2,000 mg Intravenous Q12H    hydroCHLOROthiazide  25 mg Oral Daily    ipratropium-albuterol  1 ampule Inhalation TID    lidocaine  1 patch Transdermal Daily    citalopram  10 mg Oral Daily    levothyroxine  50 mcg Oral QAM AC    rosuvastatin  10 mg Oral Nightly    pantoprazole  40 mg Oral QAM AC    potassium chloride  10 mEq Oral BID    sodium chloride flush  5-40 mL Intravenous 2 times per day     Continuous Infusions:   sodium chloride 25 mL (06/14/21 1623)     PRN Meds:  traMADol, magic (miracle) mouthwash with nystatin, methocarbamol, sodium chloride flush, sodium chloride, polyethylene glycol, acetaminophen **OR** acetaminophen, ipratropium-albuterol    Labs:  CBC:   Recent Labs     06/13/21 0628 06/14/21  0511 06/15/21  0532   WBC 20.0* 17.8* 18.3*   HGB 12.5 11.5* 12.2   HCT 37.7 34.7* 36.6   MCV 88.9 89.3 88.6    212 258     BMP:   Recent Labs     06/13/21 0628 06/14/21  0511 06/15/21  0532   * 130* 133*   K 3.5 3.2* 3.5   CL 95* 93* 94*   CO2 23 26 26   BUN 11 10 10   CREATININE <0.5* <0.5* <0.5*       Cultures:  6/11/2021 sputum NRF  6/9/2021 blood NGTD  6/9/2021 urine NG  6/9/2021 SARS-CoV-2 negative    Films:  CXR 6/12: The cardiac silhouette is stable.  Aortic vascular calcification. Progressive right perihilar and basilar opacification with increased  loculated right pleural effusion and associated volume loss.  New left  perihilar opacification.  Small left pleural effusion. 6/14/2021 ultrasound chest  Impression   Consolidated right lower lobe with only a trace amount of pleural fluid which   is very loculated.  Due to the small volume of fluid and degree loculation,   thoracentesis was not performed.        ASSESSMENT:  · Acute respiratory failure with hypoxemia  · HCAP  · Loculated R pleural effusion-too small for thoracentesis on 6/14/21    PLAN:  · Supplemental oxygen to maintain SaO2 >92%; wean as tolerated  · Cefepime and vancomycin day #4/8, completed 5 days azithromycin   · Pleural effusion is too small to tap  · On Xarelto

## 2021-06-15 NOTE — PROGRESS NOTES
in chair prior to therapist arrival.     Pain   Yes  Rating: moderate  Location:LUE and R flank  Pain Medicine Status: Received pain med prior to tx and RN notified      Bed Mobility:   Supine to Sit:  Min A   Sit to Supine:  SBA  Rolling:           Not Tested  Scooting:        Not Tested and SBA    Transfer Training:   Sit to stand:   CGA  Stand to sit:  CGA  BSC to Chair:  CGA and with RW and gait belt  Chair to [facility]:   Not Tested   Standard toilet:   Not Tested     Completed functional mobility across room x2 with RW and CGA. Activity Tolerance   Pt completed therapy session with SOB noted with position changes. SpO2: 88-94% on 2L  HR: 50s    ADL Training:   Upper body dressing: Not Tested  Upper body bathing:  Not Tested  Lower body dressing:  Not Tested  Lower body bathing:  Not Tested  Toileting:   Not Tested   Grooming/Hygiene:  Min A  use mouthwash  Eating: Setup assist to cut food, encouragement to try eating    Therapeutic Exercise: NA  Shoulder flex/ext:  x10  Shoulder abd/add:  x10  Shoulder horizontal abd/add:  x10  Scapular retraction:  x10    Patient Education:   Role of OT  Recommendations for DC  Safe RW use/hand placement    Positioning Needs:   Pt in bed, alarm set, positioned in proper neutral alignment and pressure relief provided. Call light provided and all needs within reach    Family Present:  No    Assessment: Making progress toward goals, limited by pain and SOB. Continue as tolerated. GOALS  To be met in 3 Visits:  1). Bed to toilet/BSC: SBA     To be met in 5 Visits:  1). Supine to/from Sit:             Supervision  2). Upper Body Bathing:         Supervision  3). Lower Body Bathing:         CGA  4). Upper Body Dressing:       Supervision  5). Lower Body Dressing:       CGA  6).  Pt to demonstrate UE exs x 15 reps with minimal cues    Plan: cont with [facility], OTR/L #[id number]        If patient discharges from this facility prior to next visit, this note will serve as the Discharge Summary

## 2021-06-16 ENCOUNTER — APPOINTMENT (OUTPATIENT)
Dept: INTERVENTIONAL RADIOLOGY/VASCULAR | Age: 81
DRG: 177 | End: 2021-06-16
Payer: MEDICARE

## 2021-06-16 VITALS
HEIGHT: 65 IN | BODY MASS INDEX: 26.33 KG/M2 | HEART RATE: 62 BPM | RESPIRATION RATE: 20 BRPM | TEMPERATURE: 96.6 F | WEIGHT: 158 LBS | DIASTOLIC BLOOD PRESSURE: 65 MMHG | SYSTOLIC BLOOD PRESSURE: 128 MMHG | OXYGEN SATURATION: 97 %

## 2021-06-16 LAB
ANION GAP SERPL CALCULATED.3IONS-SCNC: 10 MMOL/L (ref 3–16)
BASOPHILS ABSOLUTE: 0 K/UL (ref 0–0.2)
BASOPHILS RELATIVE PERCENT: 0 %
BUN BLDV-MCNC: 9 MG/DL (ref 7–20)
CALCIUM SERPL-MCNC: 9.1 MG/DL (ref 8.3–10.6)
CHLORIDE BLD-SCNC: 97 MMOL/L (ref 99–110)
CO2: 30 MMOL/L (ref 21–32)
CREAT SERPL-MCNC: <0.5 MG/DL (ref 0.6–1.2)
EOSINOPHILS ABSOLUTE: 0 K/UL (ref 0–0.6)
EOSINOPHILS RELATIVE PERCENT: 0 %
GFR AFRICAN AMERICAN: >60
GFR NON-AFRICAN AMERICAN: >60
GLUCOSE BLD-MCNC: 106 MG/DL (ref 70–99)
HCT VFR BLD CALC: 35.1 % (ref 36–48)
HEMOGLOBIN: 11.8 G/DL (ref 12–16)
LYMPHOCYTES ABSOLUTE: 1.5 K/UL (ref 1–5.1)
LYMPHOCYTES RELATIVE PERCENT: 10 %
MAGNESIUM: 2 MG/DL (ref 1.8–2.4)
MCH RBC QN AUTO: 29.9 PG (ref 26–34)
MCHC RBC AUTO-ENTMCNC: 33.6 G/DL (ref 31–36)
MCV RBC AUTO: 88.9 FL (ref 80–100)
MONOCYTES ABSOLUTE: 0.3 K/UL (ref 0–1.3)
MONOCYTES RELATIVE PERCENT: 2 %
NEUTROPHILS ABSOLUTE: 13.1 K/UL (ref 1.7–7.7)
NEUTROPHILS RELATIVE PERCENT: 88 %
PDW BLD-RTO: 16.6 % (ref 12.4–15.4)
PLATELET # BLD: 265 K/UL (ref 135–450)
PLATELET SLIDE REVIEW: ADEQUATE
PMV BLD AUTO: 8.6 FL (ref 5–10.5)
POTASSIUM REFLEX MAGNESIUM: 3.2 MMOL/L (ref 3.5–5.1)
RBC # BLD: 3.95 M/UL (ref 4–5.2)
SLIDE REVIEW: ABNORMAL
SODIUM BLD-SCNC: 137 MMOL/L (ref 136–145)
WBC # BLD: 14.9 K/UL (ref 4–11)

## 2021-06-16 PROCEDURE — 36573 INSJ PICC RS&I 5 YR+: CPT

## 2021-06-16 PROCEDURE — 6370000000 HC RX 637 (ALT 250 FOR IP): Performed by: PHYSICIAN ASSISTANT

## 2021-06-16 PROCEDURE — 85025 COMPLETE CBC W/AUTO DIFF WBC: CPT

## 2021-06-16 PROCEDURE — 2580000003 HC RX 258: Performed by: INTERNAL MEDICINE

## 2021-06-16 PROCEDURE — C1751 CATH, INF, PER/CENT/MIDLINE: HCPCS

## 2021-06-16 PROCEDURE — 6370000000 HC RX 637 (ALT 250 FOR IP): Performed by: INTERNAL MEDICINE

## 2021-06-16 PROCEDURE — 99239 HOSP IP/OBS DSCHRG MGMT >30: CPT | Performed by: INTERNAL MEDICINE

## 2021-06-16 PROCEDURE — 36415 COLL VENOUS BLD VENIPUNCTURE: CPT

## 2021-06-16 PROCEDURE — 2580000003 HC RX 258: Performed by: PHYSICIAN ASSISTANT

## 2021-06-16 PROCEDURE — 02HV33Z INSERTION OF INFUSION DEVICE INTO SUPERIOR VENA CAVA, PERCUTANEOUS APPROACH: ICD-10-PCS | Performed by: RADIOLOGY

## 2021-06-16 PROCEDURE — 83735 ASSAY OF MAGNESIUM: CPT

## 2021-06-16 PROCEDURE — 2700000000 HC OXYGEN THERAPY PER DAY

## 2021-06-16 PROCEDURE — 94761 N-INVAS EAR/PLS OXIMETRY MLT: CPT

## 2021-06-16 PROCEDURE — 6360000002 HC RX W HCPCS: Performed by: INTERNAL MEDICINE

## 2021-06-16 PROCEDURE — 80048 BASIC METABOLIC PNL TOTAL CA: CPT

## 2021-06-16 PROCEDURE — 94640 AIRWAY INHALATION TREATMENT: CPT

## 2021-06-16 RX ORDER — SODIUM CHLORIDE 9 MG/ML
25 INJECTION, SOLUTION INTRAVENOUS PRN
Status: DISCONTINUED | OUTPATIENT
Start: 2021-06-16 | End: 2021-06-16 | Stop reason: SDUPTHER

## 2021-06-16 RX ORDER — SODIUM CHLORIDE 0.9 % (FLUSH) 0.9 %
5-40 SYRINGE (ML) INJECTION EVERY 12 HOURS SCHEDULED
Status: DISCONTINUED | OUTPATIENT
Start: 2021-06-16 | End: 2021-06-16 | Stop reason: SDUPTHER

## 2021-06-16 RX ORDER — POTASSIUM CHLORIDE 750 MG/1
40 TABLET, EXTENDED RELEASE ORAL ONCE
Status: COMPLETED | OUTPATIENT
Start: 2021-06-16 | End: 2021-06-16

## 2021-06-16 RX ORDER — LIDOCAINE HYDROCHLORIDE 10 MG/ML
5 INJECTION, SOLUTION INFILTRATION; PERINEURAL ONCE
Status: DISCONTINUED | OUTPATIENT
Start: 2021-06-16 | End: 2021-06-16 | Stop reason: HOSPADM

## 2021-06-16 RX ORDER — TRAMADOL HYDROCHLORIDE 50 MG/1
50 TABLET ORAL EVERY 6 HOURS PRN
Qty: 8 TABLET | Refills: 0 | Status: SHIPPED | OUTPATIENT
Start: 2021-06-16 | End: 2021-06-18

## 2021-06-16 RX ORDER — SODIUM CHLORIDE 0.9 % (FLUSH) 0.9 %
5-40 SYRINGE (ML) INJECTION PRN
Status: DISCONTINUED | OUTPATIENT
Start: 2021-06-16 | End: 2021-06-16 | Stop reason: SDUPTHER

## 2021-06-16 RX ADMIN — CEFEPIME 2000 MG: 2 INJECTION, POWDER, FOR SOLUTION INTRAVENOUS at 18:32

## 2021-06-16 RX ADMIN — ACETAMINOPHEN 650 MG: 325 TABLET ORAL at 08:12

## 2021-06-16 RX ADMIN — RIVAROXABAN 20 MG: 20 TABLET, FILM COATED ORAL at 16:51

## 2021-06-16 RX ADMIN — POTASSIUM CHLORIDE 10 MEQ: 750 TABLET, EXTENDED RELEASE ORAL at 08:12

## 2021-06-16 RX ADMIN — VANCOMYCIN HYDROCHLORIDE 1250 MG: 10 INJECTION, POWDER, LYOPHILIZED, FOR SOLUTION INTRAVENOUS at 04:47

## 2021-06-16 RX ADMIN — TRAMADOL HYDROCHLORIDE 25 MG: 50 TABLET ORAL at 13:13

## 2021-06-16 RX ADMIN — IPRATROPIUM BROMIDE AND ALBUTEROL SULFATE 1 AMPULE: .5; 3 SOLUTION RESPIRATORY (INHALATION) at 13:32

## 2021-06-16 RX ADMIN — LEVOTHYROXINE SODIUM 50 MCG: 50 TABLET ORAL at 08:12

## 2021-06-16 RX ADMIN — CEFEPIME 2000 MG: 2 INJECTION, POWDER, FOR SOLUTION INTRAVENOUS at 08:44

## 2021-06-16 RX ADMIN — HYDROCHLOROTHIAZIDE 25 MG: 25 TABLET ORAL at 08:12

## 2021-06-16 RX ADMIN — POTASSIUM CHLORIDE 40 MEQ: 750 TABLET, EXTENDED RELEASE ORAL at 09:50

## 2021-06-16 RX ADMIN — Medication 10 ML: at 08:14

## 2021-06-16 RX ADMIN — CITALOPRAM HYDROBROMIDE 10 MG: 20 TABLET ORAL at 08:12

## 2021-06-16 RX ADMIN — METHOCARBAMOL TABLETS 500 MG: 500 TABLET, COATED ORAL at 09:50

## 2021-06-16 RX ADMIN — IPRATROPIUM BROMIDE AND ALBUTEROL SULFATE 1 AMPULE: .5; 3 SOLUTION RESPIRATORY (INHALATION) at 05:39

## 2021-06-16 RX ADMIN — PANTOPRAZOLE SODIUM 40 MG: 40 TABLET, DELAYED RELEASE ORAL at 08:13

## 2021-06-16 ASSESSMENT — PAIN DESCRIPTION - ORIENTATION: ORIENTATION: LEFT

## 2021-06-16 ASSESSMENT — PAIN DESCRIPTION - LOCATION: LOCATION: ARM

## 2021-06-16 ASSESSMENT — PAIN SCALES - GENERAL: PAINLEVEL_OUTOF10: 6

## 2021-06-16 ASSESSMENT — PAIN DESCRIPTION - PAIN TYPE: TYPE: ACUTE PAIN

## 2021-06-16 ASSESSMENT — PAIN DESCRIPTION - DESCRIPTORS: DESCRIPTORS: ACHING

## 2021-06-16 NOTE — DISCHARGE SUMMARY
cefepime, vancomycin and azithromycin( finished 5 days)  - Thoracentesis attempted. Pleural fluid was too little to tap. - clinically improved, discharged with PICC on Vanc and Cefepime     Pleuritic chest pain right   - likely with pna, neg for PE, prn Toradol, Percocet prn  - discharged with Tramadol     Left scapular pain  - likely cervical strain  - ct chest neg  - Local lidocaine patch     Leukocytosis  - due to pneumonia, remained high , no fevers, cx remain neg    Persistent  Leucocytosis - improved, down to 14.9 at d/c      COPD  - stable. No AE  - on Fluticasone, Anoro ellipta.      Hyponatremia - Resolved  - likely due to fluid volume depletion  - improved with IVF- IVF dced  - Started 1500 ml FR - changed to 1800 ml   - Na 137 at d/c     Hypokalemia  Hypomagnesemia - Resolved  - replaced electrolytes.  Improved  - monitored labs - repleted K prior to d/c     Hypothyroidism  - home dose of synthroid 50 mcg      Paroxysmal A-fib  - rates controlled  - continued amiodarone and Xarelto     Hypertension  - BP stable.  Held HCTZ due to hyponatremia - resumed      GERD  - on PPI.      Hyperlipidemia  - on Crestor     Depression  - on Celexa    Procedures (Please Review Full Report for Details)  Modified Barium Swallow    Consults    SLP  Pulmonology    Physical Exam at Discharge:    /65   Pulse 62   Temp 96.6 °F (35.9 °C) (Oral)   Resp 20   Ht 5' 5\" (1.651 m)   Wt 158 lb (71.7 kg)   SpO2 97%   Breastfeeding No   BMI 26.29 kg/m²   General: elderly female up in bed, sick appearing   Awake, alert and oriented.  Appears to be not in any distress  Mucous Membranes:  Pink , anicteric  Neck: No JVD, no carotid bruit, no thyromegaly  Chest:  Clear to auscultation bilaterally, diminished in right Lower lobe with crackles   Cardiovascular:  Irregular  S1S2 heard, no murmurs or gallops  Abdomen:  Soft, undistended, non tender, no organomegaly, BS present  Extremities: left scapular pain and left upper arm pain with movements noted  No edema or cyanosis. Distal pulses well felt  Neurological : grossly normal    CBC:   Recent Labs     06/14/21  0511 06/15/21  0532 06/16/21 0518   WBC 17.8* 18.3* 14.9*   HGB 11.5* 12.2 11.8*   HCT 34.7* 36.6 35.1*   MCV 89.3 88.6 88.9    258 265     BMP:   Recent Labs     06/14/21  0511 06/15/21  0532 06/16/21 0518   * 133* 137   K 3.2* 3.5 3.2*   CL 93* 94* 97*   CO2 26 26 30   BUN 10 10 9   CREATININE <0.5* <0.5* <0.5*     LIVER PROFILE:   Recent Labs     06/14/21 0511   AST 23   ALT 23   BILIDIR 0.3   BILITOT 0.9   ALKPHOS 125     PT/INR:   Recent Labs     06/14/21 0511   PROTIME 16.0*   INR 1.37*     CULTURES    Blood cx x2: NGTD    resp cx: NGTD    Urine cx: NGTD    RADIOLOGY    US CHEST INCLUDING MEDIASTINUM 6/14/2021   Final Result   Consolidated right lower lobe with only a trace amount of pleural fluid which   is very loculated. Due to the small volume of fluid and degree loculation,   thoracentesis was not performed. XR CHEST PORTABLE 6/12/2021   Final Result   Progressive right perihilar and basilar opacification with volume loss. Increased loculated right pleural effusion. New left perihilar opacification concerning for pneumonia. FL MODIFIED BARIUM SWALLOW W VIDEO 6/10/2021   Final Result   Negative for aspiration      Please see separate speech pathology report for full discussion of findings   and recommendations. CT CHEST PULMONARY EMBOLISM W CONTRAST 6/9/2021   Final Result   1. Negative for pulmonary embolus. 2. Right lower lobe pneumonia with small parapneumonic effusion and reactive   right hilar/mediastinal adenopathy.          IR PICC WO SQ PORT/PUMP > 5 YEARS    (Results Pending)     Discharge Medications     Medication List      START taking these medications    cefepime  infusion  Commonly known as: MAXIPIME  Infuse 2,000 mg intravenously every 12 hours for 5 days Compound per protocol     traMADol 50 MG tablet  Commonly known as: ULTRAM  Take 1 tablet by mouth every 6 hours as needed for Pain for up to 2 days. vancomycin  infusion  Commonly known as: VANCOCIN  Infuse 1,250 mg intravenously Q18H for 4 days Compound per protocol. CONTINUE taking these medications    amiodarone 200 MG tablet  Commonly known as: CORDARONE     Anoro Ellipta 62.5-25 MCG/INH Aepb inhaler  Generic drug: umeclidinium-vilanterol     Arnuity Ellipta 100 MCG/ACT Aepb  Generic drug: fluticasone     citalopram 20 MG tablet  Commonly known as: CELEXA     hydroCHLOROthiazide 25 MG tablet  Commonly known as: HYDRODIURIL  Take 1 tablet by mouth daily     Levothyroxine Sodium 50 MCG Caps     melatonin 3 MG Tabs tablet     methocarbamol 500 MG tablet  Commonly known as: ROBAXIN     montelukast 10 MG tablet  Commonly known as: SINGULAIR     omeprazole 20 MG delayed release capsule  Commonly known as: PRILOSEC     potassium chloride 10 MEQ extended release tablet  Commonly known as: KLOR-CON M     rivaroxaban 10 MG Tabs tablet  Commonly known as: XARELTO     rosuvastatin 10 MG tablet  Commonly known as: CRESTOR        STOP taking these medications    HYDROcodone-acetaminophen 5-325 MG per tablet  Commonly known as: NORCO     predniSONE 10 MG tablet  Commonly known as: [facility]           Where to Get Your Medications      You can get these medications from any pharmacy    Bring a paper prescription for each of these medications  · traMADol 50 MG tablet     Information about where to get these medications is not yet available    Ask your nurse or doctor about these medications  · cefepime  infusion  · vancomycin  infusion           Discharged in stable condition to SNF. Follow Up: Follow up with physician at SNF. Total time spent on discharge is 35 minutes        SHARYN Joel.

## 2021-06-16 NOTE — PROGRESS NOTES
Rocephine complete. IV out to r a/c. PICC inplace for antibiotics at facility. Denies any pain. Up to the commode. Care plan and education complete. AVS printed, medications reviewed. AVS signed.

## 2021-06-16 NOTE — CARE COORDINATION
DISCHARGE ORDER  Date/Time 2021 1:10 PM  Completed by: Nancy Potter RN, Case Management    Patient Name: Vin Lewis    : 1940      Admit order Date and Status:INPT 21  Noted discharge order. (verify MD's last order for status of admission/Traditional Medicare 3 [place] Inpatient qualifying stay required for SNF)    Confirmed discharge plan with: pt and dtrBritney              Patient:  Yes              When pt confirms DC plan does any support person need to be contacted by CM Yes if yes who__dtr Nigel Dakins at bedside___                      Discharge to Facility: [address] phone number for staff giving report: [phone]   Pre-certification completed: [facility] Exemption Notification (HENS) completed: yes   Discharge orders and Continuity of Care faxed to facility:  facility to obtain from [employer]. Transportation:               Medical Transport explained with choice list offered to pt/family. Choice:(no preference)  [facility] up time: 2677-3608      Pt/family/Nursing/Facility aware of  time:   Yes Names: pt, dtr, nurse, facility,   Ambulance form completed:  yes:      Comments:    Pt is being d/c'd to [facility] today. Pt's O2 sats are 94% on 2lpm.    Discharge timeout done with Diamond Mcknight RN. All discharge needs and concerns addressed. Discharging nurse to complete KATERINA, reconcile AVS, and place final copy with patient's discharge packet. Discharging RN to ensure that written prescriptions for  Level II medications are sent with patient to the facility as per protocol.

## 2021-06-16 NOTE — PLAN OF CARE
Problem: Falls - Risk of:  Goal: Will remain free from falls  Description: Will remain free from falls  Outcome: Ongoing  Goal: Absence of physical injury  Description: Absence of physical injury  Outcome: Ongoing     Problem: Pain:  Goal: Pain level will decrease  Description: Pain level will decrease  Outcome: Ongoing  Goal: Control of acute pain  Description: Control of acute pain  Outcome: Ongoing  Goal: Control of chronic pain  Description: Control of chronic pain  Outcome: Ongoing  Goal: Patient's pain/discomfort is manageable  Description: Patient's pain/discomfort is manageable  Outcome: Ongoing     Problem: Infection:  Goal: Will remain free from infection  Description: Will remain free from infection  Outcome: Ongoing     Problem: Daily Care:  Goal: Daily care needs are met  Description: Daily care needs are met  Outcome: Ongoing     Problem: Skin Integrity:  Goal: Skin integrity will stabilize  Description: Skin integrity will stabilize  Outcome: Ongoing     Problem: Discharge Planning:  Goal: Patients continuum of care needs are met  Description: Patients continuum of care needs are met  Outcome: Ongoing     Problem: Discharge Planning:  Goal: Discharged to appropriate level of care  Description: Discharged to appropriate level of care  Outcome: Ongoing  Goal: Participates in care planning  Description: Participates in care planning  Outcome: Ongoing     Problem: Airway Clearance - Ineffective:  Goal: Clear lung sounds  Description: Clear lung sounds  Outcome: Ongoing  Goal: Ability to maintain a clear airway will improve  Description: Ability to maintain a clear airway will improve  Outcome: Ongoing     Problem: Fluid Volume - Deficit:  Goal: Achieves intake and output within specified parameters  Description: Achieves intake and output within specified parameters  Outcome: Ongoing     Problem: Gas Exchange - Impaired:  Goal: Levels of oxygenation will improve  Description: Levels of oxygenation will improve  Outcome: Ongoing     Problem: Hyperthermia:  Goal: Ability to maintain a body temperature in the normal range will improve  Description: Ability to maintain a body temperature in the normal range will improve  Outcome: Ongoing     Problem: Tobacco Use:  Goal: Will participate in inpatient tobacco-use cessation counseling  Description: Will participate in inpatient tobacco-use cessation counseling  Outcome: Ongoing

## 2021-06-16 NOTE — PROGRESS NOTES
Vancomycin Day: 5    Patient's labs, cultures, vitals, and vancomycin regimen reviewed. No changes today.   Trough due on 6/17 at [address] 6/16/20213:42 PM  .

## 2021-06-16 NOTE — PROGRESS NOTES
Handoff report and transfer of care given at bedside to [facility]. Patient in stable condition, denies needs/concerns at this time. Call light within reach.

## 2021-06-16 NOTE — PROGRESS NOTES
Shift assessment complete; see flow sheet. Scheduled medications administered; See MAR. IV infusing without difficulty. O2 2 LPM nc in place. Pt denies any needs at this time. Call light within reach, bed in low locked position, bed alarm on.

## 2021-06-16 NOTE — PROGRESS NOTES
RESPIRATORY THERAPY ASSESSMENT     Name:  Jannette Kaplan  Medical Record Number:  [id number]  Age: [de-identified] y.o. Gender: female  : 1940  Today's Date:  6/10/2021  Room:  [facility]0214-01     Assessment      Is the patient being admitted for a COPD or Asthma exacerbation?  No   (If yes the patient will be seen every 4 hours for the first 24 hours and then reassessed)     Patient Admission Diagnosis        Allergies  No Known Allergies     Minimum Predicted Vital Capacity:                Actual Vital Capacity:                       Pulmonary History:COPD  Home Oxygen Therapy:  room air  Home Respiratory Therapy:Fluticasone Furoate  and Umeclidinium Bromide/Vilanterol   Current Respiratory Therapy:  Duoneb 4x daily  Treatment Type: HHN  Medications: Albuterol/Ipratropium     Respiratory Severity Index(RSI)   Patients with orders for inhalation medications, oxygen, or any therapeutic treatment modality will be placed on Respiratory Protocol. They will be assessed with the first treatment and at least every 72 hours thereafter. The following severity scale will be used to determine frequency of treatment intervention.     Smoking History: Pulmonary Disease or Smoking History, Greater than 15 pack year = 2    Social History  Social History            Tobacco Use    Smoking status: Former Smoker       Types: Cigarettes       Quit date: 2000       Years since quittin.5    Smokeless tobacco: Never Used   Substance Use Topics    Alcohol use: No    Drug use:  No         Recent Surgical History: None = 0  Past Surgical History  Past Surgical History         Past Surgical History:   Procedure Laterality Date    APPENDECTOMY        CYST REMOVAL        HYSTERECTOMY                Level of Consciousness: Alert, Oriented, and Cooperative = 0     Level of Activity: Walking with assistance = 1     Respiratory Pattern: Regular Pattern; RR 8-20 = 0     Breath Sounds: Diminshed bilaterally and/or crackles = 2     Sputum   ,  , Sputum How Obtained: Spontaneous cough  Cough: Strong, spontaneous, non-productive = 0     Vital Signs   /85   Pulse 87   Temp 97.2 °F (36.2 °C) (Oral)   Resp 18   Ht 5' 5\" (1.651 m)   Wt 158 lb (71.7 kg)   SpO2 92%   Breastfeeding No   BMI 26.29 kg/m²   SPO2 (COPD values may differ): 88-89% on room air or greater than 92% on FiO2 28- 35% = 2     Peak Flow (asthma only): not applicable = 0     RSI: 7-8 = BID and Q4HPRN (every four hours as needed) for dyspnea           Plan         Goals: medication delivery     Patient/caregiver was educated on the proper method of use for Respiratory Care Devices: Yes       Level of patient/caregiver understanding able to:   ? Verbalize understanding   ? Demonstrate understanding       ? Teach back        ? Needs reinforcement       ? No available caregiver               ? Other:     Response to education:  Excellent      Is patient being placed on Home Treatment Regimen? No      Does the patient have everything they need prior to discharge? Yes      Comments: patient assessed. Chart reviewed     Plan of Care: Duoneb TID and Q4PRN     Electronically signed by Shea De Anda RCP on 6/10/2021 at 11:23 AM     Respiratory Protocol Guidelines      1. Assessment and treatment by Respiratory Therapy will be initiated for medication and therapeutic interventions upon initiation of aerosolized medication. 2. Physician will be contacted for respiratory rate (RR) greater than 35 breaths per minute. Therapy will be held for heart rate (HR) greater than 140 beats per minute, pending direction from physician. 3. Bronchodilators will be administered via Metered Dose Inhaler (MDI) with spacer when the following criteria are met:  a. Alert and cooperative     b. HR < 140 bpm  c. RR < 30 bpm                d. Can demonstrate a 2-3 second inspiratory hold  4.  Bronchodilators will be administered via Hand Held Nebulizer [facility]) to patients when ANY of the following criteria are met  a. Incognizant or uncooperative          b. Patients treated with HHN at Home        c. Unable to demonstrate proper use of MDI with spacer     d. RR > 30 bpm   5. Bronchodilators will be delivered via Metered Dose Inhaler (MDI), HHN, Aerogen to intubated patients on mechanical ventilation. 6. Inhalation medication orders will be delivered and/or substituted as outlined below.     Aerosolized Medications Ordering and Administration Guidelines:     1. All Medications will be ordered by a physician, and their frequency and/or modality will be adjusted as defined by the patients Respiratory Severity Index (RSI) score. 2. If the patient does not have documented COPD, consider discontinuing anticholinergics when RSI is less than 9.  3. If the bronchospasm worsens (increased RSI), then the bronchodilator frequency can be increased to a maximum of every 4 hours. If greater than every 4 hours is required, the physician will be contacted. 4. If the bronchospasm improves, the frequency of the bronchodilator can be decreased, based on the patient's RSI, but not less than home treatment regimen frequency. 5. Bronchodilator(s) will be discontinued if patient has a RSI less than 9 and has received no scheduled or as needed treatment for 72  Hrs.     Patients Ordered on a Mucolytic Agent:     1. Must always be administered with a bronchodilator.     2. Discontinue if patient experiences worsened bronchospasm, or secretions have lessened to the point that the patient is able to clear them with a cough.     Anti-inflammatory and Combination Medications:     1.  If the patient lacks prior history of lung disease, is not using inhaled anti-inflammatory medication at home, and lacks wheezing by examination or by history for at least 24 hours, contact physician for possible discontinuation.

## 2021-06-16 NOTE — PROGRESS NOTES
IM Progress Note    Admit Date:  6/9/2021  7    Interval history:  Right LL pna and hypoxia   Currently on 2 L of oxygen. Subjective:  Ms. Tash Dominguez says she feels about the same  Pleural fluid was too small to tap. Objective:   BP (!) 146/73   Pulse 69   Temp 97.2 °F (36.2 °C) (Oral)   Resp 18   Ht 5' 5\" (1.651 m)   Wt 158 lb (71.7 kg)   SpO2 94%   Breastfeeding No   BMI 26.29 kg/m²       Intake/Output Summary (Last 24 hours) at 6/16/2021 0902  Last data filed at 6/15/2021 2300  Gross per 24 hour   Intake 120 ml   Output --   Net 120 ml       Physical Exam:        General: elderly female up in bed, sick appearing   Awake, alert and oriented. Appears to be not in any distress  Mucous Membranes:  Pink , anicteric  Neck: No JVD, no carotid bruit, no thyromegaly  Chest:  Clear to auscultation bilaterally, diminished in right Lower lobe with crackles   Cardiovascular:  Irregular  S1S2 heard, no murmurs or gallops  Abdomen:  Soft, undistended, non tender, no organomegaly, BS present  Extremities: left scapular pain and left upper arm pain with movements noted  No edema or cyanosis.  Distal pulses well felt  Neurological : grossly normal        Medications:   Scheduled Medications:    potassium chloride  40 mEq Oral Once    rivaroxaban  20 mg Oral Dinner    vancomycin  1,250 mg Intravenous Q18H    amiodarone  200 mg Oral Every Other Day    cefepime  2,000 mg Intravenous Q12H    hydroCHLOROthiazide  25 mg Oral Daily    ipratropium-albuterol  1 ampule Inhalation TID    lidocaine  1 patch Transdermal Daily    citalopram  10 mg Oral Daily    levothyroxine  50 mcg Oral QAM AC    rosuvastatin  10 mg Oral Nightly    pantoprazole  40 mg Oral QAM AC    sodium chloride flush  5-40 mL Intravenous 2 times per day     I   sodium chloride 25 mL (06/14/21 1623)     benzonatate, traMADol, magic (miracle) mouthwash with nystatin, methocarbamol, sodium chloride flush, sodium chloride, polyethylene glycol,

## 2021-06-17 ENCOUNTER — TELEPHONE (OUTPATIENT)
Dept: PULMONOLOGY | Age: 81
End: 2021-06-17

## 2021-06-17 DIAGNOSIS — R59.0 MEDIASTINAL LYMPHADENOPATHY: Primary | ICD-10-CM

## 2021-06-17 NOTE — TELEPHONE ENCOUNTER
Inpatient Notes  Received: MD Nadine Garay MA  CT chest no IV dye in 3 months, dx Mediastinal lymphadenopathy, can be with me if Johnny Cheema not available.

## 2021-06-21 NOTE — TELEPHONE ENCOUNTER
Spoke with pt to inform, pt states that she is not sure how long she will be in rehab, asking for office to call back in 1 month to see if she has been discharged so she can have daughter make transportation arrangements for her.

## 2021-09-15 ENCOUNTER — HOSPITAL ENCOUNTER (OUTPATIENT)
Dept: PHYSICAL THERAPY | Age: 81
Setting detail: THERAPIES SERIES
Discharge: HOME | End: 2021-09-15
Payer: MEDICARE

## 2021-09-15 PROCEDURE — 95992 CANALITH REPOSITIONING PROC: CPT

## 2021-09-15 PROCEDURE — 97112 NEUROMUSCULAR REEDUCATION: CPT

## 2021-09-15 PROCEDURE — 97161 PT EVAL LOW COMPLEX 20 MIN: CPT

## 2021-09-15 NOTE — FLOWSHEET NOTE
Exercise/Home Exercise Program:   0 minutes    Therapeutic Activity:  0 minutes     Gait: 0 minutes    Neuromuscular Re-Education:  10 minutes  Pt inst in role of PT, prognosis, plan of care, activity modification, and benefits of therapy. Discussed dizziness vs unsteadiness   Pt to monitor symptoms in next week and decide between the two  Falls risk education      Canalith Repositioning Procedure:  15 minutes  Pt + for R post canal canalithiasis  Treated with CRP for above x 2  Pt educated on BPPV and given handout       Manual Therapy:  0 minutes    Modalities: 0 minutes      ASSESSMENT:  See eval     GOALS:  Patient stated goal: \"to not be dizzy, to not fall\"  []? Progressing: []? Met: []? Not Met: []? Adjusted     Therapist goals for Patient:   Short Term Goals: To be achieved in: 3 weeks  1. Independent in HEP and progression per patient tolerance, in order to prevent re-injury. []? Progressing: []? Met: []? Not Met: []? Adjusted  2. Patient will have a decrease in vestibular symptoms by 50% to facilitate improvement in movement, function, balance and ADLs as indicated by Functional Deficits. []? Progressing: []? Met: []? Not Met: []? Adjusted     Long Term Goals: To be achieved in: 6 weeks  1. Disability index score of 6 or less for the [facility] to assist with reaching prior level of function. []? Progressing: []? Met: []? Not Met: []? Adjusted  2.   [][][][]  NOT APPLICABLE  3. Patient will demonstrate negative positional testing for BPPV as indicated by patients Functional Deficits. []? Progressing: []? Met: []? Not Met: []? Adjusted  4. Patient will return to functional activities including bending, rolling, reaching overhead without increased symptoms or restriction. []? Progressing: []? Met: []? Not Met: []? Adjusted  5. Improve Tinetti score to 24/28 or better     []? Progressing: []? Met: []? Not Met: []?  Adjusted              Overall Progression Towards Functional goals/ Treatment Progress Update:  [] Patient is progressing as expected towards functional goals listed. [] Progression is slowed due to complexities/Impairments listed. [] Progression has been slowed due to co-morbidities. [x] Plan just implemented, too soon to assess goals progression <30days   [] Goals require adjustment due to lack of progress  [] Patient is not progressing as expected and requires additional follow up with physician  [] Other    Prognosis for POC: [x] Good [] Fair  [] Poor    Patient requires continued skilled intervention: [x] Yes  [] No    Treatment/Activity Tolerance:  [x] Patient able to complete treatment  [] Patient limited by fatigue  [] Patient limited by pain    [] Patient limited by other medical complications  [] Other:         PLAN: See eval  [] Continue per plan of care [] Alter current plan (see comments above)  [x] Plan of care initiated [] Hold pending MD visit [] Discharge        Therapeutic Exercise and NMR EXR  [] (60220) Provided verbal/tactile cueing for activities related to strengthening, flexibility, endurance, ROM  for improvements in proximal strength and core control with self care, mobility, lifting and ambulation. [x] (03958) Provided verbal/tactile cueing for activities related to improving balance, coordination, kinesthetic sense, posture, motor skill, proprioception  to assist with core control in self care, mobility, lifting, and ambulation.      Therapeutic Activities and Gait:    [] (20435 or 09960) Provided verbal/tactile cueing for activities related to improving balance, coordination, kinesthetic sense, posture, motor skill, proprioception and motor activation to allow for proper function  with self care and ADLs  [] (50246) Provided training and instruction to the patient for proper core and proximal hip recruitment and positioning with ambulation re-education     Home Exercise Program:    [] (27669) Reviewed/Progressed HEP activities related to strengthening, flexibility, endurance, ROM of core, proximal hip and LE for functional self-care, mobility, lifting and ambulation   [] (22703) Reviewed/Progressed HEP activities related to improving balance, coordination, kinesthetic sense, posture, motor skill, proprioception of core, proximal hip and LE for self care, mobility, lifting, and ambulation      Manual Treatments:  PROM / STM / Oscillations-Mobs:  G-I, II, III, IV (PA's, Inf., Post.)  [] (53419) Provided manual therapy to mobilize proximal hip and LS spine soft tissue/joints for the purpose of modulating pain, promoting relaxation,  increasing ROM, reducing/eliminating soft tissue swelling/inflammation/restriction, improving soft tissue extensibility and allowing for proper ROM for normal function with self care, mobility, lifting and ambulation. CRP:  [x] (86879) Canalith Repositioning procedure for the assessment, treatment and education of BPPV    Modalities:       Charges:  Timed Code Treatment Minutes: 10   Total Treatment Minutes: 50     Medicare Cap total YTD:        [x]N/A  Workers Comp Time Stamp  (Per CPT and Total Treatment) [x]N/A   Time In:   Time Out:       [x] EVAL    [] Dry Needling  [] KP(61233)   x     [] EStim Unattended 73037  [x] NMR (98095)  x     [] Estim Attended  81594  [] Manual (25524)  x      [] Mechanical Txn 74144  [] TA    x     [] Ultrasound  [] Gait   x  [] Vaso  [x] CRP    [] Ionto           [] Other:        Electronically signed by: Thalia Gomez PT , OMT-C, [id number]        Note: If patient does not return for scheduled/ recommended follow up visits, this note will serve as a discharge from care along with most recent update on progress.

## 2021-09-15 NOTE — PROGRESS NOTES
[facility]  [address]     [place], [facility]  Phone: [phone]       Fax:   [phone]          Dear Referring Practitioner: Dr. Maci Ruggiero,    We had the pleasure of evaluating the following patient for physical therapy services at [address] [facility]. A summary of our findings can be found in the initial assessment below. This includes our plan of care. If you have any questions or concerns regarding these findings, please do not hesitate to contact me at the office phone number above. Thank you for this referral.       Physician/Provider Signature:_______________________________Date:__________________  By signing above (or electronic signature), therapist's plan is approved by physician           PHYSICAL THERAPY VESTIBULAR EVALUATION    Patient: Merry Langford     : 1940     MRN: [id number]    Referring Physician: Referring Practitioner: Dr. Maci Ruggiero        Evaluation Date: 9/15/2021        Medical Diagnosis Information:  Diagnosis: BPPV unspecified H81.10   Treatment Diagnosis: BPPV R ear H81.11, imbalance, abnormality of gait                                           Insurance information: PT Insurance Information: Medicare / medicaid     Precautions/ Contra-indications: HTN, afib, falls  Latex Allergy:  [x]NO      []YES      Preferred Language for Healthcare:   [x]English       []other:    C-SSRS Triggered by Intake questionnaire (Past 2 wk assessment):   [x] No, Questionnaire did not trigger screening.   [] Yes, Patient intake triggered further evaluation      [] C-SSRS Screening completed  [] PCP notified via Plan of Care  [] Emergency services notified      SUBJECTIVE FINDINGS        History of Present Illness:   . Patient reports symptoms began: Pt states dizziness on and off for months, pt states she was ignoring it.   States she has had some falls, most recent being 21, pt was bending over and just kept going. Pt saw MD next day, referred to PT. Pt not scheduled to return to PCP at this time      Pt experiences symptoms of vertigo? YES  Describe:     room spinning, loss of balance and dizziness    How long do these symptoms last?      seconds    How often do spells occur?      daily    Vertigo is:     induced by motion and induced by position changes     Pt experiences disequilibrium? YES    Symptoms worse with:    bending over, reaching overhead, rolling in bed    Associated hearing/ ear symptoms:     NO      Any recent illness or infections? Yes  Describe: broke hand, had pneumonia, cat scratch     Any recent accidents or head trauma? Yes  Describe: 8/23/21    Any history of migraines or headaches? No      History of Prior Therapy/Testing (e.g. MRI):  CT scan of head and ED visit    Review of Medications:    Pt h/o or currently taking any vestibular suppressants? No    Pt aware of any medications that may cause dizziness? No    History of Falls or near Falls:    Any falls to the ground? YES  How many in last 6 months? 4    Does pt complain of stumble, stagger, or side step while walking? NO  Does pt complain of drift to one side while walking?        no    Limitations (hearing/vision loss/other):    Does pt wear glasses/contacts? yes:  for reading    Does pt have chronic hearing loss? yes: BILATERAL    Does pt wear hearing aids? yes: LEFT    Current Functional Limitations:   YES  Functional complaints:  Frequent falls, avoids dizzy positions       Pain:     NO  Location: NA             Relevant Medical History: HTN, afib.  Fall risk    Functional Scale/Score:     Measure Used: Dizziness Handicap Inventory   Score: 20/100  Date Assessed:  9/15/21     Occupation/School:  Pt is retired     Sport/recreational activities:  NA    Patient goal for therapy:  \"to not be dizzy, to not fall\"        OBJECTIVE FINDINGS      Blood Pressure:  not tested  Supine: Seated:  Standing:     Cervical AROM:    decreased by 50%  Description: stiff    Vertebral Artery test in sitting position:     Negative    Positional Testing:   tested  Right [place] Hallpike:    vertigo noted, description: pt reports brief vertigo lasting approx 6 seconds, + latency period  Left [place] Hallpike:      negative   Right Roll test:      negative  Left Roll test:      negative  Head Center Test:     negative       Balance Testing:     tested  Tinetti Total Score:    Tinetti Total Score: 18 Risk of Falls:   []Low (> 24)   []Mod (19-23)   []High (< 18)  Balance:  Balance Score: 9     Gait:  Gait Score: 9   Disability Index:  Tinetti Disability Index: 20-39%    Gait Testing:   tested  Level of Assistance needed:  Modified Independent  Gait Deviations (firm surface/linoleum):    decreased sai, forward flexed posture and deviated path  Assistive Device Used:  Single point cane on right    Stairs:   Level of Assistance needed:      Not Tested  Pt able to negotiate up/down 4 stairs:  N/A  Assistive Device Used:      N/A      Bandages/Dressings/Incisions: [x]None        [x] Patient history, allergies, meds reviewed. Medical chart reviewed. See intake form. Review of Systems (ROS):  [x]Performed Review of systems (Integumentary, Cardiopulmonary, Neurological) by intake and observation. Intake form has been scanned into medical record. Patient has been instructed to contact their primary care physician regarding ROS issues if not already being addressed at this time.       Co-morbidities/Complexities (which will affect course of rehabilitation):   [x]None           Arthritic conditions   []Rheumatoid arthritis (M05.9)  []Osteoarthritis (M19.91)   Cardiovascular conditions   []Hypertension (I10)  []Hyperlipidemia (E78.5)  []Angina pectoris (I20)  []Atherosclerosis (I70)   Musculoskeletal conditions   []Disc pathology   []Congenital spine pathologies   []Prior surgical intervention  []Osteoporosis (M81.8)  []Osteopenia (M85.8)   Endocrine conditions   []Hypothyroid (E03.9)  []Hyperthyroid Gastrointestinal conditions   []Constipation (X12.95)   Metabolic conditions   []Morbid obesity (E66.01)  []Diabetes type 1(E10.65) or 2 (E11.65)   []Neuropathy (G60.9)     Pulmonary conditions   []Asthma (J45)  []Coughing   []COPD (J44.9)   Psychological Disorders  []Anxiety (F41.9)  []Depression (F32.9)   []Other:   []Other:           Barriers to/and or personal factors that will affect rehab potential:              []Age  []Sex    []Smoker              []Motivation/Lack of Motivation                        []Co-Morbidities              []Cognitive Function, education/learning barriers              []Environmental, home barriers              []profession/work barriers  []past PT/medical experience  []other:  Justification:     Falls Risk Assessment (30 days):   [x] Falls Risk assessed; no intervention required. [x] Falls Risk assessed; Patient requires intervention due to being higher risk   [] TUG score (>12s at risk):            [x] Tinetti score (<24 at risk)   [x] Falls education provided, including continued use of [employer] for all gait      ASSESSMENT:   Patient is a  80 Y. O. female presenting with diagnosis of BPPV, unspecified. Assessment reveals pt + BPPV R ear, posterior canal canalithiasis. Pt also exhibits decreased balance and gait deviations. Patient will benefit from PT to address vestibular complaints and to promote return to highest level of functional independence.       Functional Impairments:  Problem List/Functional Limitations:   [] BPPV    [x] Right [x] Posterior Canal [x] Canalithiasis    [] Left  [] Horizontal Canal [] Cupulolithiasis                                                 [] Anterior Canal   [] Decreased Gaze Stabilization    [] Increased Motion Sensitivity   [] Unilateral Vestibular Hypofunction [] Bilateral Vestibular Hypofunction   [x] Gait Instability    [x] Decreased tolerance for ADLs    [] Decreased functional strength   [x] Reduced Balance/Proprioceptive control   [] Reduced ability to hear/focus   [] Noted cervical/thoracic/GHJ joint hypomobility   [] Noted cervical/thoracic/GHJ joint hypermobility   [] Decreased cervical/UE functional ROM   [] Noted Headache pain aggravated by neck movements with/without dizziness   [] Abnormal reflexes/sensation/myotomal/dermatomal deficits   [] Decreased DCF control or ability to hold head up   [] Decreased RC/scapular/core strength and neuromuscular control     Functional Activity Limitations (from functional questionnaire and intake)   []Reduced ability to tolerate prolonged functional positions   [x]Reduced ability or difficulty with changes of positions or transfers between positions  [x]Reduced ability to transfer in/out of bed or rolling in bed   []Reduced ability or tolerance with driving, reading and/or computer work   []Reduced ability to perform lifting, reaching, carrying tasks   [x]Reduced ability to forward bend   [x]Reduced ability to ambulate prolonged functional periods/distances/surfaces   []Reduced ability to ascend/descend stairs  []Reduced ability to concentrate/focus  []Reduced ability to turn/pitch head rapidly  []Reduced ability to self-correct for losses of balance   []other:       Participation Restrictions   [x]Reduced participation in self-care activities   [x]Reduced participation in home management activities   []Reduced participation in work activities   [x]Reduced participation in social activities   []Reduced participation in sport/recreational activities    Classification:   [x]Signs/symptoms consistent with BPPV (benign paroxysmal positional vertigo)      []Signs/symptoms consistent with unilateral peripheral vestibulopathy (i.e., vestibular neuritis, labyrinthitis, acoustic neuroma)   []Signs/symptoms consistent with central vestibulopathy  []Signs/symptoms consistent with migraine-related vestibulopathy  []Signs/symptoms consistent with Meniere's disease / post-traumatic hydrops  []Signs/symptoms consistent with perilymphatic fistula   []Signs/symptoms consistent with cervicogenic dizziness  [x]Signs/symptoms consistent with gait instability   []Signs/symptoms consistent with motion sensitivity    []signs/symptoms consistent with neck pain with mobility deficits     []signs/symptoms consistent with neck pain with movement coordinated impairments    []signs/symptoms consistent with neck pain with radiating pain    []signs/symptoms consistent with neck pain with headaches (cervicogenic)    []Signs/symptoms consistent with nerve root involvement including myotome & dermatome dysfunction   []sign/symptoms consistent with facet dysfunction of cervical and thoracic spine    []signs/symptoms consistent suggesting central cord compression/UMN syndromes   []signs/symptoms consistent with discogenic cervical pain   []signs/symptoms consistent with rib dysfunction   []signs/symptoms consistent with postural dysfunction   []signs/symptoms consistent with shoulder pathology    []signs/symptoms consistent with post-surgical status including decreased ROM, strength and function.    []signs/symptoms consistent with pathology which may benefit from Dry Needling  []signs/symptoms which may limit the use of advanced manual therapy techniques: (Elevated CV risk profile, recent trauma, intolerance to end range positions, prior TIA, visual issues, UE neurological compromise)   []other:      Prognosis/Rehab Potential:      []Excellent   [x]Good    []Fair   []Poor    Tolerance of evaluation/treatment:    []Excellent   [x]Good    []Fair   []Poor     Physical Therapy Evaluation Complexity Justification  [x] A history of present problem with:  [x] no personal factors and/or comorbidities that impact the plan of care;  []1-2 personal factors and/or comorbidities that impact the plan of care  []3 personal factors and/or comorbidities that impact the plan of care  [x] An examination of body systems using standardized tests and measures addressing any of the following: body structures and functions (impairments), activity limitations, and/or participation restrictions;:  [] a total of 1-2 or more elements   [x] a total of 3 or more elements   [] a total of 4 or more elements   [x] A clinical presentation with:  [x] stable and/or uncomplicated characteristics   [] evolving clinical presentation with changing characteristics  [] unstable and unpredictable characteristics;   [x] Clinical decision making of [x] low, [] moderate, [] high complexity using standardized patient assessment instrument and/or measurable assessment of functional outcome. [x] EVAL (LOW) 86536 (typically 15 minutes face-to-face)  [] EVAL (MOD) 72159 (typically 30 minutes face-to-face)  [] EVAL (HIGH) 82391 (typically 45 minutes face-to-face)  [] RE-EVAL       PLAN: Begin PT with an emphasis on   Reassess for BPPV and treat as needed   Assess pt's complaint of dizziness vs unsteadiness     Assess LE strength/flexibility/stairs    Gait and balance exercises     Frequency/Duration:  2 days per week for 6 Weeks:  Interventions:  [x]  Therapeutic exercise including: strength training, ROM, for LEs, cervical spine & UEs  [x]  NMR activation and proprioception for BLEs, vestibular training/habituation, balance, coordination  [x]  Manual therapy as indicated via: canalith repositioning maneuvers, Dry Needling/IASTM, STM, PROM, Gr I-IV mobilizations, manipulation. [x]  Modalities as needed that may include: thermal agents, E-stim, Biofeedback, US, iontophoresis as indicated  [x]  Gait training  [x]  Patient education on BPPV/vestibular function, balance, postural re-education, activity modification, progression of HEP.   [x]  CRP for assessment, treatment and education of BPPV    HEP instruction: Written HEP instructions and/or education materials provided and

## 2021-09-20 NOTE — PROGRESS NOTES
Physical Therapy  Cancel today per patient.   Transportation will not bring patient that late in afternoon

## 2021-09-22 ENCOUNTER — HOSPITAL ENCOUNTER (OUTPATIENT)
Dept: CT IMAGING | Age: 81
Discharge: HOME | End: 2021-09-22
Payer: MEDICARE

## 2021-09-22 ENCOUNTER — OFFICE VISIT (OUTPATIENT)
Dept: PULMONOLOGY | Age: 81
End: 2021-09-22
Payer: MEDICARE

## 2021-09-22 VITALS
SYSTOLIC BLOOD PRESSURE: 152 MMHG | TEMPERATURE: 96.7 F | DIASTOLIC BLOOD PRESSURE: 102 MMHG | RESPIRATION RATE: 20 BRPM | WEIGHT: 146 LBS | BODY MASS INDEX: 24.92 KG/M2 | HEART RATE: 97 BPM | OXYGEN SATURATION: 95 % | HEIGHT: 64 IN

## 2021-09-22 DIAGNOSIS — R59.0 MEDIASTINAL LYMPHADENOPATHY: Primary | ICD-10-CM

## 2021-09-22 DIAGNOSIS — R59.0 MEDIASTINAL LYMPHADENOPATHY: ICD-10-CM

## 2021-09-22 DIAGNOSIS — J90 PLEURAL EFFUSION, RIGHT: ICD-10-CM

## 2021-09-22 PROCEDURE — 1123F ACP DISCUSS/DSCN MKR DOCD: CPT | Performed by: INTERNAL MEDICINE

## 2021-09-22 PROCEDURE — 99213 OFFICE O/P EST LOW 20 MIN: CPT | Performed by: INTERNAL MEDICINE

## 2021-09-22 PROCEDURE — 71250 CT THORAX DX C-: CPT

## 2021-09-22 PROCEDURE — 1036F TOBACCO NON-USER: CPT | Performed by: INTERNAL MEDICINE

## 2021-09-22 PROCEDURE — G8400 PT W/DXA NO RESULTS DOC: HCPCS | Performed by: INTERNAL MEDICINE

## 2021-09-22 PROCEDURE — G8427 DOCREV CUR MEDS BY ELIG CLIN: HCPCS | Performed by: INTERNAL MEDICINE

## 2021-09-22 PROCEDURE — 1090F PRES/ABSN URINE INCON ASSESS: CPT | Performed by: INTERNAL MEDICINE

## 2021-09-22 PROCEDURE — G8417 CALC BMI ABV UP PARAM F/U: HCPCS | Performed by: INTERNAL MEDICINE

## 2021-09-22 PROCEDURE — 4040F PNEUMOC VAC/ADMIN/RCVD: CPT | Performed by: INTERNAL MEDICINE

## 2021-09-22 NOTE — PROGRESS NOTES
Pulmonary Follow-up Note  Chief Complaint: Shortness of breath; 3 month CT hospital follow up on 6/12/21  Referring Provider: hospital f/u    Interval history: Feeling better overall    Presenting history:   6/12/21  81 yo female with a PMHx of a. Fib, htn, & former tobacco abuse, who presented to [facility] on 6/9 with a few days of sob. She was found to have pneumonia and hypoxemic respiratory failure associated with pleuritic pain. Pulmonology was consulted for evaluation of pleural effusion and HCAP. She was treated with cefepime, vancomycin, and azithromycin. Effusion was too small to tap. Discharged on room air and with PICC. reports that she quit smoking about 20 years ago. Her smoking use included cigarettes. She has never used smokeless tobacco.     Review of Systems:      Physical Exam:  Blood pressure (!) 152/102, pulse 97, temperature 96.7 °F (35.9 °C), resp. rate 20, height 5' 4\" (1.626 m), weight 146 lb (66.2 kg), SpO2 95 %, not currently breastfeeding. Constitutional:  No acute distress. HENT:  Oropharynx is clear and moist.   Eyes: Pupils equal, round, and reactive to light. No scleral icterus. Neck: No tracheal deviation present. Cardiovascular: Normal heart sounds. No lower extremity edema. Pulmonary/Chest: No wheezes. No rhonchi. No rales. No decreased breath sounds. No accessory muscle usage or stridor. Musculoskeletal: No cyanosis. No clubbing. Skin: Skin is warm and dry. Psychiatric: Normal mood and affect. Data:   PFT 1/7/2021 FEV1 2.2 L 107% FEV1/FVC 0.73 104% FVC 2.88 103%  No significant improvement w/ bronchodilator    CT Chest 9/22/2021  Impression   Stable or improving mediastinal/right hilar adenopathy.  Significant interval improvement consolidative/pneumonic changes RLL with mild persistent atelectatic/postinflammatory/fibrotic changes, as above.  No pleural effusion. Additional stable findings, as above.      Assessment:  · Mediastinal lymphadenopathy has resolved  · Loculated R pleural effusion from 6/12/21 has resolved  · H/o Atrial Fibrillation  · Anticoagulated on Xarelto  · Former tobacco abuse, quit date 20 years ago    Plan:   · No follow up imaging required.   Routine f/u should be with Dr. Jigna Lucas

## 2021-09-23 ENCOUNTER — HOSPITAL ENCOUNTER (OUTPATIENT)
Dept: PHYSICAL THERAPY | Age: 81
Setting detail: THERAPIES SERIES
Discharge: HOME | End: 2021-09-23
Payer: MEDICARE

## 2021-09-29 ENCOUNTER — HOSPITAL ENCOUNTER (OUTPATIENT)
Dept: PHYSICAL THERAPY | Age: 81
Setting detail: THERAPIES SERIES
Discharge: HOME | End: 2021-09-29
Payer: MEDICARE

## 2021-09-29 PROCEDURE — 97110 THERAPEUTIC EXERCISES: CPT

## 2021-09-29 PROCEDURE — 95992 CANALITH REPOSITIONING PROC: CPT

## 2021-09-29 NOTE — FLOWSHEET NOTE
Physical Therapy Daily Treatment Note    [x]Daily Treatment Note    []Progress Note    [x] Discharge Summary         Date:  2021    Patient Name:  Maggie Méndez    :  1940  MRN: [id number]      Medical/Treatment Diagnosis Information:  · Diagnosis: BPPV unspecified H81.10  · Treatment Diagnosis: BPPV R ear H81.11, imbalance, abnormality of gait    Insurance/Certification information:  PT Insurance Information: Medicare / medicaid    Physician Information:   Dr. Miguel Lambert of care signed :  []  Yes  [x] No  []  Cosign [x]  Fax    Date of Patient follow up with Physician:  NA    Is this a Progress Report:     [x]  Yes  []  No      If Yes:  Date Range for reporting period:  Beginning 9/15/2021  Ending 21    Progress report will be due (10 Rx or 30 days whichever is less): NA      Recertification will be due (POC Duration  / 90 days whichever is less): NA      Visit # Insurance Allowable Auth Required     BMN []  Yes [x]  No        Functional Scale: [facility]     Date 9/15/21   Score 20/100    Functional Scale: [facility]     Date 21   Score  0/100      Latex Allergy:  [x]NO      []YES  Preferred Language for Healthcare:   [x]English       []other:    RESTRICTIONS/PRECAUTIONS:  HTN, afib, fall risk    SUBJECTIVE:    Pt states she no longer has dizziness  Pt uses cane when out of house, and only at night and first thing in morning when in the home. Pt states this is her normal   Pt wishes to be discharged from therapy at this time since she feels she is back to her baseline. Pt agreeable to some LE strength exs to do at home. Pain level:  No complaints of pain. At eval:  0/10      Plan Moving Forward/ For next visit:   · NA            OBJECTIVE:     Exercises/Interventions:     Exercises in bold performed in department today. Items not bolded are carried forward from prior visits for continuity of the record.   Exercise/Equipment Resistance/Repetitions HEP Other comments       [] bridges 1x10 [x]      SLR 1x10 B [x]      Standing hip abduction B 1x10 B [x]      Standing heel raises B  1x10  [x]      Standing gastroc stretch B 30 sec x 3 B [x]        []        []        []          []         []        []        []        []        []        []        []        []      Therapeutic Exercise/Home Exercise Program:   30 minutes  Assessed LE strength:   Hip flexion 4+/5 B  Hip abd 3-/5 B   Knee flexion 4+/5 B  Knee extension 4+/5 B  Ankle DF 5/5 B  Ankle PF      Flexibility:  B gastrocs tight  B HS tight     PT notes bruise on pt's L knee, states she was trying to get the dog in, pulled on the leash and was looking at the dog and then she tripped on the step. HEP established, see above, pt given handouts  Pt inst to work up to 3x10 if able, 1x/day      Therapeutic Activity:  0 minutes     Gait: 0 minutes    Neuromuscular Re-Education:  5 minutes  Reassessed Tinetti  Balance Score: 16  Gait Score: 10  Tinetti Total Score: 26        Canalith Repositioning Procedure:  10 minutes  Reassessed for BPPV  All positional testing negative for vertigo and nystagmus. Pt educated on BPPV and what to do if she feels it has returned. Manual Therapy:  0 minutes    Modalities: 0 minutes      ASSESSMENT:   Pt has made progress with PT. DHI score improved to 0/100 from 20/100. Tinetti score improved to 26/28 from 18/28. No longer complains of dizziness. Pt feels she is back to her baseline and feels ready for discharge. GOALS:  Patient stated goal: \"to not be dizzy, to not fall\"  []? Progressing: [x]? Met: []? Not Met: []? Adjusted     Therapist goals for Patient:   Short Term Goals: To be achieved in: 3 weeks  1. Independent in HEP and progression per patient tolerance, in order to prevent re-injury. []? Progressing: [x]? Met: []? Not Met: []? Adjusted  2.  Patient will have a decrease in vestibular symptoms by 50% to facilitate improvement in movement, function, balance and ADLs as indicated by Functional Deficits. []? Progressing: [x]? Met: []? Not Met: []? Adjusted     Long Term Goals: To be achieved in: 6 weeks  1. Disability index score of 6 or less for the [facility] to assist with reaching prior level of function. []? Progressing: [x]? Met: []? Not Met: []? Adjusted  2.   [][][][]  NOT APPLICABLE  3. Patient will demonstrate negative positional testing for BPPV as indicated by patients Functional Deficits. []? Progressing: [x]? Met: []? Not Met: []? Adjusted  4. Patient will return to functional activities including bending, rolling, reaching overhead without increased symptoms or restriction. []? Progressing: [x]? Met: []? Not Met: []? Adjusted  5. Improve Tinetti score to 24/28 or better     []? Progressing: [x]? Met: []? Not Met: []? Adjusted              Overall Progression Towards Functional goals/ Treatment Progress Update:  [] Patient is progressing as expected towards functional goals listed. [] Progression is slowed due to complexities/Impairments listed. [] Progression has been slowed due to co-morbidities.   [] Plan just implemented, too soon to assess goals progression <30days   [] Goals require adjustment due to lack of progress  [] Patient is not progressing as expected and requires additional follow up with physician  [x] PT goals have been met as marked above    Prognosis for POC: [x] Good [] Fair  [] Poor    Patient requires continued skilled intervention: [] Yes  [x] No    Treatment/Activity Tolerance:  [x] Patient able to complete treatment  [] Patient limited by fatigue  [] Patient limited by pain    [] Patient limited by other medical complications  [] Other:         PLAN: See eval  [] Continue per plan of care [] Alter current plan (see comments above)  [] Plan of care initiated [] Hold pending MD visit [x] Discharge to [facility]        Therapeutic Exercise and NMR EXR  [x] (36980) Provided verbal/tactile cueing for activities related to strengthening, flexibility, endurance, ROM  for improvements in proximal strength and core control with self care, mobility, lifting and ambulation. [x] (80158) Provided verbal/tactile cueing for activities related to improving balance, coordination, kinesthetic sense, posture, motor skill, proprioception  to assist with core control in self care, mobility, lifting, and ambulation. Therapeutic Activities and Gait:    [] (37467 or 38106) Provided verbal/tactile cueing for activities related to improving balance, coordination, kinesthetic sense, posture, motor skill, proprioception and motor activation to allow for proper function  with self care and ADLs  [] (71617) Provided training and instruction to the patient for proper core and proximal hip recruitment and positioning with ambulation re-education     Home Exercise Program:    [x] (23172) Reviewed/Progressed HEP activities related to strengthening, flexibility, endurance, ROM of core, proximal hip and LE for functional self-care, mobility, lifting and ambulation   [] (81875) Reviewed/Progressed HEP activities related to improving balance, coordination, kinesthetic sense, posture, motor skill, proprioception of core, proximal hip and LE for self care, mobility, lifting, and ambulation      Manual Treatments:  PROM / STM / Oscillations-Mobs:  G-I, II, III, IV (PA's, Inf., Post.)  [] (77795) Provided manual therapy to mobilize proximal hip and LS spine soft tissue/joints for the purpose of modulating pain, promoting relaxation,  increasing ROM, reducing/eliminating soft tissue swelling/inflammation/restriction, improving soft tissue extensibility and allowing for proper ROM for normal function with self care, mobility, lifting and ambulation.      CRP:  [x] (71354) Canalith Repositioning procedure for the assessment, treatment and education of BPPV    Modalities:       Charges:  Timed Code Treatment Minutes: 35   Total Treatment Minutes: 45     Medicare Cap total YTD: [x]N/A  Workers Comp Time Stamp  (Per CPT and Total Treatment) [x]N/A   Time In:   Time Out:       [] EVAL    [] Dry Needling  [x] UL(86508)   x  2   [] EStim Unattended 99831  [] NMR (12526)  x     [] Estim Attended  39021  [] Manual (65695)  x      [] Mechanical Txn 01184  [] TA    x     [] Ultrasound  [] Gait   x  [] Vaso  [x] CRP    [] Ionto           [] Other:        Electronically signed by: Fidelina Arana, PT , OMT-C, [id number]        Note: If patient does not return for scheduled/ recommended follow up visits, this note will serve as a discharge from care along with most recent update on progress.

## 2021-12-02 ENCOUNTER — HOSPITAL ENCOUNTER (OUTPATIENT)
Dept: GENERAL RADIOLOGY | Age: 81
Discharge: HOME | End: 2021-12-02
Payer: MEDICARE

## 2021-12-02 ENCOUNTER — HOSPITAL ENCOUNTER (OUTPATIENT)
Age: 81
Discharge: HOME | End: 2021-12-02
Payer: MEDICARE

## 2021-12-02 DIAGNOSIS — S69.92XA INJURY OF LEFT HAND, INITIAL ENCOUNTER: ICD-10-CM

## 2021-12-02 PROCEDURE — 73110 X-RAY EXAM OF WRIST: CPT

## 2021-12-02 PROCEDURE — 73130 X-RAY EXAM OF HAND: CPT

## 2022-04-06 ENCOUNTER — HOSPITAL ENCOUNTER (OUTPATIENT)
Dept: MAMMOGRAPHY | Age: 82
Discharge: HOME | End: 2022-04-06
Payer: MEDICARE

## 2022-04-06 DIAGNOSIS — Z12.39 SCREENING BREAST EXAMINATION: ICD-10-CM

## 2022-04-06 PROCEDURE — 77063 BREAST TOMOSYNTHESIS BI: CPT

## 2022-04-07 ENCOUNTER — TELEPHONE (OUTPATIENT)
Dept: MAMMOGRAPHY | Age: 82
End: 2022-04-07

## 2022-06-06 ENCOUNTER — HOSPITAL ENCOUNTER (OUTPATIENT)
Age: 82
Discharge: HOME | End: 2022-06-06
Payer: MEDICARE

## 2022-06-06 ENCOUNTER — HOSPITAL ENCOUNTER (OUTPATIENT)
Dept: GENERAL RADIOLOGY | Age: 82
Discharge: HOME | End: 2022-06-06
Payer: MEDICARE

## 2022-06-06 DIAGNOSIS — M25.572 ACUTE LEFT ANKLE PAIN: ICD-10-CM

## 2022-06-06 PROCEDURE — 73610 X-RAY EXAM OF ANKLE: CPT

## 2023-09-19 NOTE — PROGRESS NOTES
Pt awake at this time. AM meds given. Fresh ice pack placed under left arm. Pt repositioned at this time. No pain meds due pt aware.  Call light within reach and bed alarm on Advancement Flap (Double) Text: The defect edges were debeveled with a #15 scalpel blade. Given the location of the defect and the proximity to free margins a double advancement flap was deemed most appropriate. Using a sterile surgical marker, the appropriate advancement flaps were drawn incorporating the defect and placing the expected incisions within the relaxed skin tension lines where possible. The area thus outlined was incised deep to adipose tissue with a #15 scalpel blade. The skin margins were undermined to an appropriate distance in all directions utilizing iris scissors. Following this, the designed flaps were advanced and carried over into the primary defect and sutured into place.

## 2024-03-04 ENCOUNTER — HOSPITAL ENCOUNTER (OUTPATIENT)
Age: 84
Discharge: HOME | End: 2024-03-04
Payer: MEDICARE

## 2024-03-04 ENCOUNTER — HOSPITAL ENCOUNTER (OUTPATIENT)
Dept: GENERAL RADIOLOGY | Age: 84
Discharge: HOME | End: 2024-03-04
Payer: MEDICARE

## 2024-03-04 DIAGNOSIS — J44.1 OBSTRUCTIVE CHRONIC BRONCHITIS WITH EXACERBATION (HCC): ICD-10-CM

## 2024-03-04 PROCEDURE — 71046 X-RAY EXAM CHEST 2 VIEWS: CPT

## 2024-05-02 ENCOUNTER — HOSPITAL ENCOUNTER (OUTPATIENT)
Dept: MAMMOGRAPHY | Age: 84
Discharge: HOME | End: 2024-05-02
Payer: MEDICARE

## 2024-05-02 VITALS — WEIGHT: 153 LBS | HEIGHT: 65 IN | BODY MASS INDEX: 25.49 KG/M2

## 2024-05-02 DIAGNOSIS — Z12.31 SCREENING MAMMOGRAM, ENCOUNTER FOR: ICD-10-CM

## 2024-05-02 PROCEDURE — 77063 BREAST TOMOSYNTHESIS BI: CPT

## 2024-05-23 ENCOUNTER — HOSPITAL ENCOUNTER (OUTPATIENT)
Age: 84
Discharge: HOME | End: 2024-05-23
Payer: MEDICARE

## 2024-05-23 ENCOUNTER — HOSPITAL ENCOUNTER (OUTPATIENT)
Dept: GENERAL RADIOLOGY | Age: 84
Discharge: HOME | End: 2024-05-23
Payer: MEDICARE

## 2024-05-23 DIAGNOSIS — S90.32XA HEMATOMA OF LEFT FOOT: ICD-10-CM

## 2024-05-23 PROCEDURE — 73630 X-RAY EXAM OF FOOT: CPT

## 2024-09-12 ENCOUNTER — APPOINTMENT (OUTPATIENT)
Dept: CT IMAGING | Age: 84
End: 2024-09-12
Payer: MEDICARE

## 2024-09-12 ENCOUNTER — HOSPITAL ENCOUNTER (EMERGENCY)
Age: 84
Discharge: HOME | End: 2024-09-12
Attending: EMERGENCY MEDICINE
Payer: MEDICARE

## 2024-09-12 VITALS
HEIGHT: 65 IN | HEART RATE: 98 BPM | SYSTOLIC BLOOD PRESSURE: 142 MMHG | WEIGHT: 160 LBS | RESPIRATION RATE: 18 BRPM | BODY MASS INDEX: 26.66 KG/M2 | TEMPERATURE: 97.7 F | DIASTOLIC BLOOD PRESSURE: 97 MMHG | OXYGEN SATURATION: 96 %

## 2024-09-12 DIAGNOSIS — S09.90XA CLOSED HEAD INJURY, INITIAL ENCOUNTER: Primary | ICD-10-CM

## 2024-09-12 DIAGNOSIS — S01.81XA FACIAL LACERATION, INITIAL ENCOUNTER: ICD-10-CM

## 2024-09-12 PROCEDURE — 12011 RPR F/E/E/N/L/M 2.5 CM/<: CPT

## 2024-09-12 PROCEDURE — 72125 CT NECK SPINE W/O DYE: CPT

## 2024-09-12 PROCEDURE — 99284 EMERGENCY DEPT VISIT MOD MDM: CPT

## 2024-09-12 PROCEDURE — 70450 CT HEAD/BRAIN W/O DYE: CPT

## 2024-09-12 ASSESSMENT — ENCOUNTER SYMPTOMS
VOMITING: 0
BLOOD IN STOOL: 0
DIARRHEA: 0
COUGH: 0
NAUSEA: 0

## 2024-09-12 ASSESSMENT — PAIN SCALES - GENERAL: PAINLEVEL_OUTOF10: 2

## 2024-09-12 ASSESSMENT — PAIN - FUNCTIONAL ASSESSMENT: PAIN_FUNCTIONAL_ASSESSMENT: 0-10

## 2024-10-05 NOTE — PROGRESS NOTES
Shift assessment complete; see flow sheet. Scheduled medications administered; See MAR. Call light within reach, bed in low locked position, bed alarm on. 138

## 2025-03-09 ENCOUNTER — HOSPITAL ENCOUNTER (EMERGENCY)
Age: 85
Discharge: HOME | End: 2025-03-09
Attending: EMERGENCY MEDICINE
Payer: MEDICARE

## 2025-03-09 VITALS
WEIGHT: 153.8 LBS | TEMPERATURE: 96.5 F | BODY MASS INDEX: 25.59 KG/M2 | DIASTOLIC BLOOD PRESSURE: 94 MMHG | RESPIRATION RATE: 18 BRPM | HEART RATE: 86 BPM | SYSTOLIC BLOOD PRESSURE: 138 MMHG | OXYGEN SATURATION: 95 %

## 2025-03-09 DIAGNOSIS — S01.312A COMPLEX LACERATION OF EAR, LEFT, INITIAL ENCOUNTER: Primary | ICD-10-CM

## 2025-03-09 PROCEDURE — 99282 EMERGENCY DEPT VISIT SF MDM: CPT

## 2025-03-09 PROCEDURE — 12013 RPR F/E/E/N/L/M 2.6-5.0 CM: CPT

## 2025-03-09 ASSESSMENT — PAIN - FUNCTIONAL ASSESSMENT: PAIN_FUNCTIONAL_ASSESSMENT: NONE - DENIES PAIN

## 2025-03-10 NOTE — ED PROVIDER NOTES
EMERGENCY MEDICINE ATTENDING NOTE  Star Vargas Jr., DO, FACEP, FAAEM        CHIEF COMPLAINT  Chief Complaint   Patient presents with    Fall    Ear Laceration        HISTORY OF PRESENT ILLNESS  Shahana Kwok is a 84 y.o. female who presents to the ED for evaluation of laceration to left ear.  The patient was sitting in bed when she slid down and hit her head against a chair causing a laceration.  Unable to get a stop bleeding as she is on a blood thinner.  Denies actually hitting the head hard or any loss of consciousness.  Is acting her normal self.  Has no other concerns or complaints.    Nursing/triage notes reviewed.  No other complaints, modifying factors or associated symptoms.     REVIEW OF SYSTEMS:  All systems are reviewed and are negative unless noted in the HPI.    PAST MEDICAL HISTORY  Past Medical History:   Diagnosis Date    Atrial fibrillation (HCC)     Depression (disease)     Hypertension     Restless legs syndrome (RLS)     Thyroid disease     Urinary bladder disorder        SURGICAL HISTORY  Past Surgical History:   Procedure Laterality Date    APPENDECTOMY      CYST REMOVAL      HYSTERECTOMY (CERVIX STATUS UNKNOWN)      OVARY REMOVAL         FAMILY HISTORY  Family History   Problem Relation Age of Onset    Breast Cancer Mother 65       SOCIAL HISTORY  Social History     Socioeconomic History    Marital status:      Spouse name: Not on file    Number of children: Not on file    Years of education: Not on file    Highest education level: Not on file   Occupational History    Not on file   Tobacco Use    Smoking status: Former     Current packs/day: 0.00     Types: Cigarettes     Quit date: 2000     Years since quittin.2    Smokeless tobacco: Never   Substance and Sexual Activity    Alcohol use: No    Drug use: No    Sexual activity: Not Currently   Other Topics Concern    Not on file   Social History Narrative    Not on file     Social Drivers of Health

## 2025-03-10 NOTE — DISCHARGE INSTRUCTIONS
Please return for any concern   Alert-The patient is alert, awake and responds to voice. The patient is oriented to time, place, and person. The triage nurse is able to obtain subjective information.

## 2025-04-27 ENCOUNTER — APPOINTMENT (OUTPATIENT)
Age: 85
End: 2025-04-27
Payer: MEDICARE

## 2025-04-27 ENCOUNTER — HOSPITAL ENCOUNTER (OUTPATIENT)
Age: 85
Setting detail: OBSERVATION
Discharge: HOME HEALTH | End: 2025-04-28
Attending: EMERGENCY MEDICINE | Admitting: STUDENT IN AN ORGANIZED HEALTH CARE EDUCATION/TRAINING PROGRAM
Payer: MEDICARE

## 2025-04-27 DIAGNOSIS — T67.1XXS HEAT SYNCOPE, SEQUELA: Primary | ICD-10-CM

## 2025-04-27 DIAGNOSIS — R55 SYNCOPE AND COLLAPSE: ICD-10-CM

## 2025-04-27 LAB
ALBUMIN SERPL-MCNC: 4.3 G/DL (ref 3.4–5)
ALBUMIN/GLOB SERPL: 2 {RATIO}
ALP SERPL-CCNC: 75 U/L (ref 40–129)
ALT SERPL-CCNC: 9 U/L (ref 10–40)
ANION GAP SERPL CALCULATED.3IONS-SCNC: 11 MMOL/L (ref 3–16)
AST SERPL-CCNC: 18 U/L (ref 15–37)
BACTERIA URNS QL MICRO: ABNORMAL
BASOPHILS # BLD: 0.06 K/UL (ref 0–0.2)
BASOPHILS NFR BLD: 1 %
BILIRUB SERPL-MCNC: 1 MG/DL (ref 0–1)
BILIRUB UR QL STRIP: NEGATIVE
BUN SERPL-MCNC: 14 MG/DL (ref 7–20)
CALCIUM SERPL-MCNC: 9.1 MG/DL (ref 8.3–10.6)
CASTS #/AREA URNS LPF: ABNORMAL /LPF
CHARACTER UR: ABNORMAL
CHLORIDE SERPL-SCNC: 104 MMOL/L (ref 99–110)
CLARITY UR: CLEAR
CO2 SERPL-SCNC: 25 MMOL/L (ref 21–32)
COLOR UR: ABNORMAL
CREAT SERPL-MCNC: 1.1 MG/DL (ref 0.6–1.2)
EOSINOPHIL # BLD: 0.16 K/UL (ref 0–0.6)
EOSINOPHILS RELATIVE PERCENT: 2 %
EPI CELLS #/AREA URNS HPF: ABNORMAL /HPF
ERYTHROCYTE [DISTWIDTH] IN BLOOD BY AUTOMATED COUNT: 14.3 % (ref 12.4–15.4)
GFR, ESTIMATED: 52 ML/MIN/1.73M2
GLUCOSE SERPL-MCNC: 102 MG/DL (ref 70–99)
GLUCOSE UR STRIP-MCNC: NEGATIVE MG/DL
HCT VFR BLD AUTO: 44.1 % (ref 36–48)
HGB BLD-MCNC: 14.4 G/DL (ref 12–16)
HGB UR QL STRIP.AUTO: NEGATIVE
IMM GRANULOCYTES # BLD AUTO: 0.03 K/UL (ref 0–0.5)
IMM GRANULOCYTES NFR BLD: 0 %
INR PPP: 1.5 (ref 0.9–1.2)
KETONES UR STRIP-MCNC: NEGATIVE MG/DL
LACTATE BLDV-SCNC: 1.1 MMOL/L (ref 0.4–1.9)
LEUKOCYTE ESTERASE UR QL STRIP: ABNORMAL
LYMPHOCYTES NFR BLD: 2.39 K/UL (ref 1–5.1)
LYMPHOCYTES RELATIVE PERCENT: 28 %
MAGNESIUM SERPL-MCNC: 1.9 MG/DL (ref 1.8–2.4)
MCH RBC QN AUTO: 28.5 PG (ref 26–34)
MCHC RBC AUTO-ENTMCNC: 32.7 G/DL (ref 31–36)
MCV RBC AUTO: 87.2 FL (ref 80–100)
MONOCYTES NFR BLD: 0.72 K/UL (ref 0–1.3)
MONOCYTES NFR BLD: 8 %
MUCOUS THREADS URNS QL MICRO: PRESENT
NEUTROPHILS NFR BLD: 61 %
NEUTS SEG NFR BLD: 5.28 K/UL (ref 1.7–7.7)
NITRITE UR QL STRIP: NEGATIVE
PARTIAL THROMBOPLASTIN TIME: 31.4 SEC (ref 22.1–36.4)
PH UR STRIP: 6 [PH] (ref 5–8)
PLATELET # BLD AUTO: 158 K/UL (ref 135–450)
PMV BLD AUTO: 11.4 FL (ref 9.4–12.4)
POTASSIUM SERPL-SCNC: 3.9 MMOL/L (ref 3.5–5.1)
PROCALCITONIN SERPL-MCNC: 0.04 NG/ML (ref 0–0.15)
PROT SERPL-MCNC: 6.4 G/DL (ref 6.4–8.2)
PROT UR STRIP-MCNC: ABNORMAL MG/DL
PROTHROMBIN TIME: 18.6 SEC (ref 11.9–14.9)
RBC # BLD AUTO: 5.06 M/UL (ref 4–5.2)
RBC #/AREA URNS HPF: ABNORMAL /HPF
SODIUM SERPL-SCNC: 141 MMOL/L (ref 136–145)
SP GR UR STRIP: >1.03 (ref 1–1.03)
TROPONIN I SERPL HS-MCNC: 8 NG/L (ref 0–14)
TROPONIN I SERPL HS-MCNC: 9 NG/L (ref 0–14)
UROBILINOGEN UR STRIP-ACNC: 4 EU/DL (ref 0–1)
WBC #/AREA URNS HPF: ABNORMAL /HPF
WBC OTHER # BLD: 8.6 K/UL (ref 4–11)

## 2025-04-27 PROCEDURE — 84484 ASSAY OF TROPONIN QUANT: CPT

## 2025-04-27 PROCEDURE — 83605 ASSAY OF LACTIC ACID: CPT

## 2025-04-27 PROCEDURE — 93005 ELECTROCARDIOGRAM TRACING: CPT | Performed by: EMERGENCY MEDICINE

## 2025-04-27 PROCEDURE — 85025 COMPLETE CBC W/AUTO DIFF WBC: CPT

## 2025-04-27 PROCEDURE — 85730 THROMBOPLASTIN TIME PARTIAL: CPT

## 2025-04-27 PROCEDURE — 70450 CT HEAD/BRAIN W/O DYE: CPT

## 2025-04-27 PROCEDURE — 99285 EMERGENCY DEPT VISIT HI MDM: CPT

## 2025-04-27 PROCEDURE — 80053 COMPREHEN METABOLIC PANEL: CPT

## 2025-04-27 PROCEDURE — 83735 ASSAY OF MAGNESIUM: CPT

## 2025-04-27 PROCEDURE — 85610 PROTHROMBIN TIME: CPT

## 2025-04-27 PROCEDURE — 81001 URINALYSIS AUTO W/SCOPE: CPT

## 2025-04-27 PROCEDURE — 84145 PROCALCITONIN (PCT): CPT

## 2025-04-27 PROCEDURE — 71045 X-RAY EXAM CHEST 1 VIEW: CPT

## 2025-04-27 ASSESSMENT — LIFESTYLE VARIABLES
HOW MANY STANDARD DRINKS CONTAINING ALCOHOL DO YOU HAVE ON A TYPICAL DAY: PATIENT DOES NOT DRINK
HOW OFTEN DO YOU HAVE A DRINK CONTAINING ALCOHOL: NEVER

## 2025-04-28 ENCOUNTER — APPOINTMENT (OUTPATIENT)
Age: 85
End: 2025-04-28
Attending: STUDENT IN AN ORGANIZED HEALTH CARE EDUCATION/TRAINING PROGRAM
Payer: MEDICARE

## 2025-04-28 VITALS
HEART RATE: 78 BPM | OXYGEN SATURATION: 99 % | BODY MASS INDEX: 26.29 KG/M2 | HEIGHT: 64 IN | DIASTOLIC BLOOD PRESSURE: 92 MMHG | TEMPERATURE: 98.5 F | RESPIRATION RATE: 16 BRPM | WEIGHT: 154 LBS | SYSTOLIC BLOOD PRESSURE: 146 MMHG

## 2025-04-28 PROBLEM — R55 SYNCOPE AND COLLAPSE: Status: ACTIVE | Noted: 2025-04-28

## 2025-04-28 LAB
ALBUMIN SERPL-MCNC: 4.1 G/DL (ref 3.4–5)
ALBUMIN/GLOB SERPL: 2 {RATIO}
ALP SERPL-CCNC: 74 U/L (ref 40–129)
ALT SERPL-CCNC: 7 U/L (ref 10–40)
ANION GAP SERPL CALCULATED.3IONS-SCNC: 11 MMOL/L (ref 3–16)
AST SERPL-CCNC: 18 U/L (ref 15–37)
BASOPHILS # BLD: 0.04 K/UL (ref 0–0.2)
BASOPHILS NFR BLD: 1 %
BILIRUB SERPL-MCNC: 1 MG/DL (ref 0–1)
BUN SERPL-MCNC: 14 MG/DL (ref 7–20)
CALCIUM SERPL-MCNC: 9 MG/DL (ref 8.3–10.6)
CHLORIDE SERPL-SCNC: 103 MMOL/L (ref 99–110)
CO2 SERPL-SCNC: 25 MMOL/L (ref 21–32)
CREAT SERPL-MCNC: 0.9 MG/DL (ref 0.6–1.2)
ECHO AV AREA PEAK VELOCITY: 2.6 CM2
ECHO AV AREA VTI: 2.4 CM2
ECHO AV AREA/BSA PEAK VELOCITY: 1.5 CM2/M2
ECHO AV AREA/BSA VTI: 1.4 CM2/M2
ECHO AV MEAN GRADIENT: 3 MMHG
ECHO AV MEAN VELOCITY: 0.8 M/S
ECHO AV PEAK GRADIENT: 5 MMHG
ECHO AV PEAK VELOCITY: 1.2 M/S
ECHO AV VELOCITY RATIO: 1
ECHO AV VTI: 24.1 CM
ECHO BSA: 1.78 M2
ECHO EST RA PRESSURE: 3 MMHG
ECHO LA AREA 2C: 21.6 CM2
ECHO LA AREA 4C: 17 CM2
ECHO LA MAJOR AXIS: 5.5 CM
ECHO LA MINOR AXIS: 6.2 CM
ECHO LA VOL BP: 54 ML (ref 22–52)
ECHO LA VOL MOD A2C: 60 ML (ref 22–52)
ECHO LA VOL MOD A4C: 43 ML (ref 22–52)
ECHO LA VOL/BSA BIPLANE: 31 ML/M2 (ref 16–34)
ECHO LA VOLUME INDEX MOD A2C: 34 ML/M2 (ref 16–34)
ECHO LA VOLUME INDEX MOD A4C: 25 ML/M2 (ref 16–34)
ECHO LV E' LATERAL VELOCITY: 18.6 CM/S
ECHO LV E' SEPTAL VELOCITY: 22.8 CM/S
ECHO LV EDV A2C: 43 ML
ECHO LV EDV A4C: 43 ML
ECHO LV EDV INDEX A4C: 25 ML/M2
ECHO LV EDV NDEX A2C: 25 ML/M2
ECHO LV EJECTION FRACTION 3D: 60 %
ECHO LV EJECTION FRACTION A2C: 60 %
ECHO LV EJECTION FRACTION A4C: 62 %
ECHO LV EJECTION FRACTION BIPLANE: 64 % (ref 55–100)
ECHO LV ESV A2C: 17 ML
ECHO LV ESV A4C: 16 ML
ECHO LV ESV INDEX A2C: 10 ML/M2
ECHO LV ESV INDEX A4C: 9 ML/M2
ECHO LV FRACTIONAL SHORTENING: 21 % (ref 28–44)
ECHO LV INTERNAL DIMENSION DIASTOLE INDEX: 2.17 CM/M2
ECHO LV INTERNAL DIMENSION DIASTOLIC: 3.8 CM (ref 3.9–5.3)
ECHO LV INTERNAL DIMENSION SYSTOLIC INDEX: 1.71 CM/M2
ECHO LV INTERNAL DIMENSION SYSTOLIC: 3 CM
ECHO LV IVSD: 1.1 CM (ref 0.6–0.9)
ECHO LV MASS 2D: 117.3 G (ref 67–162)
ECHO LV MASS INDEX 2D: 67 G/M2 (ref 43–95)
ECHO LV POSTERIOR WALL DIASTOLIC: 0.9 CM (ref 0.6–0.9)
ECHO LV RELATIVE WALL THICKNESS RATIO: 0.47
ECHO LVOT AREA: 2.5 CM2
ECHO LVOT AV VTI INDEX: 0.93
ECHO LVOT DIAM: 1.8 CM
ECHO LVOT MEAN GRADIENT: 3 MMHG
ECHO LVOT PEAK GRADIENT: 6 MMHG
ECHO LVOT PEAK VELOCITY: 1.2 M/S
ECHO LVOT STROKE VOLUME INDEX: 32.6 ML/M2
ECHO LVOT SV: 57 ML
ECHO LVOT VTI: 22.4 CM
ECHO MV AREA VTI: 2.9 CM2
ECHO MV E VELOCITY: 1.11 M/S
ECHO MV E/E' LATERAL: 5.97
ECHO MV E/E' RATIO (AVERAGED): 5.42
ECHO MV E/E' SEPTAL: 4.87
ECHO MV LVOT VTI INDEX: 0.87
ECHO MV MAX VELOCITY: 1.2 M/S
ECHO MV MEAN GRADIENT: 2 MMHG
ECHO MV MEAN VELOCITY: 0.7 M/S
ECHO MV PEAK GRADIENT: 6 MMHG
ECHO MV VTI: 19.5 CM
ECHO PV MAX VELOCITY: 0.9 M/S
ECHO PV PEAK GRADIENT: 3 MMHG
ECHO RA AREA 4C: 22.8 CM2
ECHO RA END SYSTOLIC VOLUME APICAL 4 CHAMBER INDEX BSA: 41 ML/M2
ECHO RA VOLUME: 71 ML
ECHO RIGHT VENTRICULAR SYSTOLIC PRESSURE (RVSP): 29 MMHG
ECHO RV BASAL DIMENSION: 3.7 CM
ECHO RV FREE WALL PEAK S': 12.8 CM/S
ECHO RV LONGITUDINAL DIMENSION: 4.9 CM
ECHO RV MID DIMENSION: 2.5 CM
ECHO RV TAPSE: 1.6 CM (ref 1.7–?)
ECHO TV REGURGITANT MAX VELOCITY: 2.53 M/S
ECHO TV REGURGITANT PEAK GRADIENT: 26 MMHG
EOSINOPHIL # BLD: 0.09 K/UL (ref 0–0.6)
EOSINOPHILS RELATIVE PERCENT: 1 %
ERYTHROCYTE [DISTWIDTH] IN BLOOD BY AUTOMATED COUNT: 14.4 % (ref 12.4–15.4)
GFR, ESTIMATED: 62 ML/MIN/1.73M2
GLUCOSE SERPL-MCNC: 129 MG/DL (ref 70–99)
HCT VFR BLD AUTO: 42.3 % (ref 36–48)
HGB BLD-MCNC: 13.8 G/DL (ref 12–16)
IMM GRANULOCYTES # BLD AUTO: 0.01 K/UL (ref 0–0.5)
IMM GRANULOCYTES NFR BLD: 0 %
LYMPHOCYTES NFR BLD: 1.45 K/UL (ref 1–5.1)
LYMPHOCYTES RELATIVE PERCENT: 17 %
MCH RBC QN AUTO: 28.7 PG (ref 26–34)
MCHC RBC AUTO-ENTMCNC: 32.6 G/DL (ref 31–36)
MCV RBC AUTO: 87.9 FL (ref 80–100)
MONOCYTES NFR BLD: 0.64 K/UL (ref 0–1.3)
MONOCYTES NFR BLD: 8 %
NEUTROPHILS NFR BLD: 74 %
NEUTS SEG NFR BLD: 6.24 K/UL (ref 1.7–7.7)
PLATELET # BLD AUTO: 159 K/UL (ref 135–450)
PMV BLD AUTO: 11.4 FL (ref 9.4–12.4)
POTASSIUM SERPL-SCNC: 3.6 MMOL/L (ref 3.5–5.1)
PROT SERPL-MCNC: 6.1 G/DL (ref 6.4–8.2)
RBC # BLD AUTO: 4.81 M/UL (ref 4–5.2)
SODIUM SERPL-SCNC: 139 MMOL/L (ref 136–145)
WBC OTHER # BLD: 8.5 K/UL (ref 4–11)

## 2025-04-28 PROCEDURE — 93306 TTE W/DOPPLER COMPLETE: CPT | Performed by: INTERNAL MEDICINE

## 2025-04-28 PROCEDURE — 6370000000 HC RX 637 (ALT 250 FOR IP): Performed by: STUDENT IN AN ORGANIZED HEALTH CARE EDUCATION/TRAINING PROGRAM

## 2025-04-28 PROCEDURE — 85025 COMPLETE CBC W/AUTO DIFF WBC: CPT

## 2025-04-28 PROCEDURE — 6360000002 HC RX W HCPCS: Performed by: STUDENT IN AN ORGANIZED HEALTH CARE EDUCATION/TRAINING PROGRAM

## 2025-04-28 PROCEDURE — 97530 THERAPEUTIC ACTIVITIES: CPT

## 2025-04-28 PROCEDURE — 94640 AIRWAY INHALATION TREATMENT: CPT

## 2025-04-28 PROCEDURE — 36415 COLL VENOUS BLD VENIPUNCTURE: CPT

## 2025-04-28 PROCEDURE — 97535 SELF CARE MNGMENT TRAINING: CPT

## 2025-04-28 PROCEDURE — 97161 PT EVAL LOW COMPLEX 20 MIN: CPT

## 2025-04-28 PROCEDURE — 97165 OT EVAL LOW COMPLEX 30 MIN: CPT

## 2025-04-28 PROCEDURE — 93306 TTE W/DOPPLER COMPLETE: CPT

## 2025-04-28 PROCEDURE — 2500000003 HC RX 250 WO HCPCS: Performed by: STUDENT IN AN ORGANIZED HEALTH CARE EDUCATION/TRAINING PROGRAM

## 2025-04-28 PROCEDURE — 80053 COMPREHEN METABOLIC PANEL: CPT

## 2025-04-28 PROCEDURE — G0378 HOSPITAL OBSERVATION PER HR: HCPCS

## 2025-04-28 RX ORDER — BUDESONIDE 0.25 MG/2ML
0.25 INHALANT ORAL 2 TIMES DAILY
Status: DISCONTINUED | OUTPATIENT
Start: 2025-04-28 | End: 2025-04-28 | Stop reason: HOSPADM

## 2025-04-28 RX ORDER — IPRATROPIUM BROMIDE 42 UG/1
2 SPRAY, METERED NASAL 4 TIMES DAILY
COMMUNITY
Start: 2025-01-23

## 2025-04-28 RX ORDER — SODIUM CHLORIDE 0.9 % (FLUSH) 0.9 %
5-40 SYRINGE (ML) INJECTION PRN
Status: DISCONTINUED | OUTPATIENT
Start: 2025-04-28 | End: 2025-04-28 | Stop reason: HOSPADM

## 2025-04-28 RX ORDER — POLYETHYLENE GLYCOL 3350 17 G/17G
17 POWDER, FOR SOLUTION ORAL DAILY PRN
Status: DISCONTINUED | OUTPATIENT
Start: 2025-04-28 | End: 2025-04-28 | Stop reason: HOSPADM

## 2025-04-28 RX ORDER — SOLIFENACIN SUCCINATE 5 MG/1
5 TABLET, FILM COATED ORAL DAILY
COMMUNITY

## 2025-04-28 RX ORDER — ACETAMINOPHEN 325 MG/1
650 TABLET ORAL EVERY 6 HOURS PRN
Status: DISCONTINUED | OUTPATIENT
Start: 2025-04-28 | End: 2025-04-28 | Stop reason: HOSPADM

## 2025-04-28 RX ORDER — MONTELUKAST SODIUM 10 MG/1
10 TABLET ORAL NIGHTLY
Status: DISCONTINUED | OUTPATIENT
Start: 2025-04-28 | End: 2025-04-28 | Stop reason: HOSPADM

## 2025-04-28 RX ORDER — SODIUM CHLORIDE 0.9 % (FLUSH) 0.9 %
5-40 SYRINGE (ML) INJECTION EVERY 12 HOURS SCHEDULED
Status: DISCONTINUED | OUTPATIENT
Start: 2025-04-28 | End: 2025-04-28 | Stop reason: HOSPADM

## 2025-04-28 RX ORDER — LEVOTHYROXINE SODIUM 75 UG/1
75 TABLET ORAL
Status: DISCONTINUED | OUTPATIENT
Start: 2025-04-28 | End: 2025-04-28 | Stop reason: HOSPADM

## 2025-04-28 RX ORDER — POTASSIUM CHLORIDE 1500 MG/1
40 TABLET, EXTENDED RELEASE ORAL PRN
Status: DISCONTINUED | OUTPATIENT
Start: 2025-04-28 | End: 2025-04-28 | Stop reason: HOSPADM

## 2025-04-28 RX ORDER — SODIUM CHLORIDE 9 MG/ML
INJECTION, SOLUTION INTRAVENOUS PRN
Status: DISCONTINUED | OUTPATIENT
Start: 2025-04-28 | End: 2025-04-28 | Stop reason: HOSPADM

## 2025-04-28 RX ORDER — POTASSIUM CHLORIDE 7.45 MG/ML
10 INJECTION INTRAVENOUS PRN
Status: DISCONTINUED | OUTPATIENT
Start: 2025-04-28 | End: 2025-04-28 | Stop reason: HOSPADM

## 2025-04-28 RX ORDER — METOPROLOL SUCCINATE 25 MG/1
25 TABLET, EXTENDED RELEASE ORAL DAILY
COMMUNITY

## 2025-04-28 RX ORDER — ACETAMINOPHEN 650 MG/1
650 SUPPOSITORY RECTAL EVERY 6 HOURS PRN
Status: DISCONTINUED | OUTPATIENT
Start: 2025-04-28 | End: 2025-04-28 | Stop reason: HOSPADM

## 2025-04-28 RX ORDER — ONDANSETRON 4 MG/1
4 TABLET, ORALLY DISINTEGRATING ORAL EVERY 8 HOURS PRN
Status: DISCONTINUED | OUTPATIENT
Start: 2025-04-28 | End: 2025-04-28 | Stop reason: HOSPADM

## 2025-04-28 RX ORDER — PANTOPRAZOLE SODIUM 40 MG/1
40 TABLET, DELAYED RELEASE ORAL
Status: DISCONTINUED | OUTPATIENT
Start: 2025-04-28 | End: 2025-04-28 | Stop reason: HOSPADM

## 2025-04-28 RX ORDER — CITALOPRAM HYDROBROMIDE 20 MG/1
40 TABLET ORAL DAILY
Status: DISCONTINUED | OUTPATIENT
Start: 2025-04-28 | End: 2025-04-28 | Stop reason: HOSPADM

## 2025-04-28 RX ORDER — HYDROCHLOROTHIAZIDE 25 MG/1
12.5 TABLET ORAL DAILY
Status: DISCONTINUED | OUTPATIENT
Start: 2025-04-28 | End: 2025-04-28 | Stop reason: HOSPADM

## 2025-04-28 RX ORDER — HYDROCHLOROTHIAZIDE 12.5 MG/1
12.5 CAPSULE ORAL DAILY
COMMUNITY

## 2025-04-28 RX ORDER — MAGNESIUM SULFATE IN WATER 40 MG/ML
2000 INJECTION, SOLUTION INTRAVENOUS PRN
Status: DISCONTINUED | OUTPATIENT
Start: 2025-04-28 | End: 2025-04-28 | Stop reason: HOSPADM

## 2025-04-28 RX ORDER — ONDANSETRON 2 MG/ML
4 INJECTION INTRAMUSCULAR; INTRAVENOUS EVERY 6 HOURS PRN
Status: DISCONTINUED | OUTPATIENT
Start: 2025-04-28 | End: 2025-04-28 | Stop reason: HOSPADM

## 2025-04-28 RX ORDER — ROSUVASTATIN CALCIUM 10 MG/1
10 TABLET, COATED ORAL NIGHTLY
Status: DISCONTINUED | OUTPATIENT
Start: 2025-04-28 | End: 2025-04-28 | Stop reason: HOSPADM

## 2025-04-28 RX ADMIN — HYDROCHLOROTHIAZIDE 12.5 MG: 25 TABLET ORAL at 12:40

## 2025-04-28 RX ADMIN — TIOTROPIUM BROMIDE AND OLODATEROL 2 PUFF: 3.124; 2.736 SPRAY, METERED RESPIRATORY (INHALATION) at 07:44

## 2025-04-28 RX ADMIN — BUDESONIDE 250 MCG: 0.25 SUSPENSION RESPIRATORY (INHALATION) at 07:44

## 2025-04-28 RX ADMIN — SODIUM CHLORIDE, PRESERVATIVE FREE 10 ML: 5 INJECTION INTRAVENOUS at 08:51

## 2025-04-28 RX ADMIN — DICLOFENAC SODIUM 2 G: 10 GEL TOPICAL at 06:40

## 2025-04-28 RX ADMIN — LEVOTHYROXINE SODIUM 75 MCG: 75 TABLET ORAL at 08:51

## 2025-04-28 RX ADMIN — PANTOPRAZOLE SODIUM 40 MG: 40 TABLET, DELAYED RELEASE ORAL at 08:51

## 2025-04-28 ASSESSMENT — PAIN DESCRIPTION - ORIENTATION: ORIENTATION: RIGHT

## 2025-04-28 ASSESSMENT — PAIN SCALES - GENERAL
PAINLEVEL_OUTOF10: 2
PAINLEVEL_OUTOF10: 6

## 2025-04-28 ASSESSMENT — PAIN DESCRIPTION - DESCRIPTORS: DESCRIPTORS: ACHING

## 2025-04-28 ASSESSMENT — PAIN DESCRIPTION - LOCATION: LOCATION: LEG

## 2025-04-28 NOTE — ED PROVIDER NOTES
[facility] EMERGENCY DEPARTMENT     EMERGENCY DEPARTMENT ENCOUNTER            Pt Name: Shahana Kwok   MRN: [id number]   Birthdate 1940   Date of evaluation: 4/27/2025   Provider: Carolyn Springer DO   PCP: Luther Dos Santos MD   Note Started: 9:17 PM EDT 4/27/25          CHIEF COMPLAINT     Chief Complaint   Patient presents with    Dizziness    Fatigue             HISTORY OF PRESENT ILLNESS:   History from : Patient and daughter  Limitations to history : None     Shahana Kwok is a 84 y.o. female who presents to emergency department with syncopal episode at home prior to admission.  Daughter is at the bedside, gives additional history.  She states she and patient had been \"out and about all day and patient had been feeling well.  Just prior to admission patient suddenly complained of feeling lightheaded nauseated with generalized weakness.  Daughter was able to help the patient to the chair following which patient experienced syncopal episode with loss of consciousness approximately 1 minute.  Following the incident, she states patient was extremely listless and slow to answer questions but able to answer questions appropriately.  Until EMS arrived.  By the time patient arrived in the emergency department patient was awake alert and oriented x 3 answering all questions appropriately.    Patient denies pain of any kind.  She denies headache vision changes  neck pain ear pain sore throat chest pain difficulty breathing abdominal pain back pain or pain to the extremities.  Denies focal weakness numbness paresthesias bowel or bladder incontinence.  She did not bite her tongue.  She denies actual vertigo.    Nursing Notes were all reviewed and agreed with, or any disagreements were addressed in the HPI.     REVIEW OF SYSTEMS :    Positives and Pertinent negatives as per HPI.      MEDICAL HISTORY   has a past medical history of Atrial fibrillation (HCC), Depression (disease), Hypertension, Restless

## 2025-04-28 NOTE — DISCHARGE SUMMARY
Hospital Medicine Discharge Summary    Patient ID: Shahana Kwok      Patient's PCP: Luther Dos Santos MD    Admit Date: 4/27/2025     Discharge Date:   4/28/2025    Admitting Provider: Tom Turpin DO     Discharge Provider: Carmelina Ash MD     Discharge Diagnoses:       Active Hospital Problems    Diagnosis     Syncope and collapse [R55]        The patient was seen and examined on day of discharge and this discharge summary is in conjunction with any daily progress note from day of discharge.    Hospital Course:   Ms. Shahaan Kwok is an 84 year old female with PMH of HLD, hypothyroidism, COPD, A-fib on Xarelto presenting with syncope and collapse      Patient comes from home.  Daughter is at the bedside.  Patient presented due to a syncopal event.  Daughter believes that patient has passed out for about 3-5 minutes.  No injuries.  No seizure-like activities, no incontinence, no postictal symptoms or period.  From her history of presentation patient most likely has had a basal vagal episode.  Patient was extensively worked up without any acute finding.  Underwent a cardiac echo showing EF of 55-60%.  Patient has been feeling well since her presentation here.  Patient and daughter would like to be discharged back to home.  PT OT consulted recommended home care.  Patient and daughter are on board.  Patient is to follow-up with PCP in 3 to 5 days or sooner if needed  Patient is to return to the ED if symptoms return          Physical Exam Performed:     BP (!) 146/92   Pulse 78   Temp 98.5 °F (36.9 °C) (Oral)   Resp 16   Ht 1.626 m (5' 4\")   Wt 69.9 kg (154 lb)   SpO2 99%   BMI 26.43 kg/m²       General appearance:  No apparent distress, cooperative.  HEENT:  Normal cephalic, atraumatic without obvious deformity.   Neck: Supple, with full range of motion.  Trachea midline.  Respiratory:  Normal respiratory effort. Clear to auscultation  Cardiovascular:  Regular rate and rhythm   Abdomen:

## 2025-04-28 NOTE — WOUND CARE
4 Eyes Skin Assessment     NAME:  Shahana Kwok  YOB: 1940  MEDICAL RECORD NUMBER:  [id number]    The patient is being assessed for  Admission    I agree that at least one RN has performed a thorough Head to Toe Skin Assessment on the patient. ALL assessment sites listed below have been assessed.      Areas assessed by both nurses:    Head, Face, Ears, Shoulders, Back, Chest, Arms, Elbows, Hands, Sacrum. Buttock, Coccyx, Ischium, Legs. Feet and Heels, Under Medical Devices , and Other N/A        Does the Patient have a Wound? No noted wound(s)       Kvng Prevention initiated by RN: No  Wound Care Orders initiated by RN: No    Pressure Injury (Stage 3,4, Unstageable, DTI, NWPT, and Complex wounds) if present, place Wound referral order by RN under : No    New Ostomies, if present place, Ostomy referral order under : No     Nurse 1 eSignature: Electronically signed by Rivka Chirinos RN on 4/28/25 at 2:11 AM EDT    **SHARE this note so that the co-signing nurse can place an eSignature**    Nurse 2 eSignature: Electronically signed by Jazmin Ford RN on 4/28/25 at 2:24 AM EDT

## 2025-04-28 NOTE — PROGRESS NOTES
[facility] - Rehabilitation Department      Physical Therapy  3215/3215-01  [facility] 3 PROGRESSIVE CARE    [x] Initial Evaluation            [x] Daily Treatment Note         [] Discharge Summary      Patient: Shahana Kwok   : 1940   MRN: [id number]   Date of Service:  2025   Discharge Recommendations: Home w/ PRN A and HHPT   AMPA Raw Score:   AM-PAC Inpatient Mobility Raw Score : 21            DME Required For Discharge:   patient has all required DME for discharge  Therapy discharge recommendations take into account each patient's current medical complexities and are subject to input/oversight from the patient's healthcare team.   Barriers to Home Discharge:   [] Steps to access home entry or bed/bath:   [] Unable to transfer, ambulate, or propel wheelchair household distances without assist   [] Limited available assist at home upon discharge    [] Patient or family requests d/c to post-acute facility    [] Poor cognition/safety awareness for d/c to home alone    []Unable to maintain ordered weight bearing status    [] Patient with salient signs of long-standing immobility   [x] Patient is at risk for falls due to: deconditioned    [] Other:  If pt is unable to be seen after this session, please let this note serve as discharge summary.  Please see case management note for discharge disposition.  Thank you.  Admitting Diagnosis: Syncope and collapse  Referring Physician: Tom Turpin DO   Current Admission Summary: Tom Turpin DO H&P :  \"Hospital Day: 2     Assessment and Plan:   Shahana Kwok is a 84 y.o. female with a pmh of COPD, hyperlipidemia, hypothyroidism, restless leg syndrome, atrial fibrillation on Xarelto, depression who presents with Syncope and collapse     Hospital Problems             Last Modified POA     * (Principal) Syncope and collapse 2025 Yes      Syncope -based on history it sounds vasovagal.  Patient denies any prior palpitations chest

## 2025-04-28 NOTE — DISCHARGE INSTR - COC
Continuity of Care Form    Patient Name: Shahana Kwok   :  1940  MRN:  [id number]    Admit date:  2025  Discharge date:  25    Code Status Order: Full Code   Advance Directives:     Admitting Physician:  Tom Turpin DO  PCP: Luther Dos Santos MD    Discharging Nurse: CAMPOS Reyna  Discharging Hospital Unit/Room#: 3215/3215-01  Discharging Unit Phone Number: [phone]    Emergency Contact:   Extended Emergency Contact Information  Primary Emergency Contact: [place] [address]  Home Phone: [phone]  Mobile Phone: [phone]  Relation: Child  Secondary Emergency Contact: Britney Skinner  Home Phone: [phone]  Mobile Phone: [phone]  Relation: Child    Past Surgical History:  Past Surgical History:   Procedure Laterality Date    APPENDECTOMY      CYST REMOVAL      HYSTERECTOMY (CERVIX STATUS UNKNOWN)      OVARY REMOVAL         Immunization History:   Immunization History   Administered Date(s) Administered    COVID-19, PFIZER Bivalent, DO NOT Dilute, (age 12y+), IM, 30 mcg/0.3 mL 2022    COVID-19, PFIZER PURPLE top, DILUTE for use, (age 12 y+), 30mcg/0.3mL 2021, 2021, 10/14/2021    COVID-19, PFIZER, , (age 12y+), IM, 30mcg/0.3mL 2023, 2024    Influenza Virus Vaccine 09/10/2010, 10/31/2011, 10/15/2015    Influenza, FLUZONE High Dose (age 65 y+), IM, Quadv, 0.7mL 2020, 2020    Influenza, FLUZONE High Dose, (age 65 y+), IM, Trivalent PF, 0.5mL 10/14/2016, 2017, 2018, 2019    Pneumococcal, PCV-13, PREVNAR 13, (age 6w+), IM, 0.5mL 2019    Pneumococcal, PPSV23, PNEUMOVAX 23, (age 2y+), SC/IM, 0.5mL 02/15/2013    TDaP, ADACEL (age 10y-64y), BOOSTRIX (age 10y+), IM, 0.5mL 2019, 2021    Td, unspecified formulation 2009    Zoster Live (Zostavax) 2015       Active Problems:  Patient Active Problem List   Diagnosis Code    Spinal stenosis of lumbar region M48.061    Bilateral

## 2025-04-28 NOTE — ED NOTES
Patient assisted to bedside commode, denies any dizziness, appeared steady on feet. Urine sample to sent lab at this time.

## 2025-04-28 NOTE — H&P
removal.  Allergies:   Allergies   Allergen Reactions    Bee Venom Anaphylaxis     Fam HX:  family history includes Breast Cancer (age of onset: 65) in her mother.  Soc HX:   Social History     Socioeconomic History    Marital status:      Spouse name: None    Number of children: None    Years of education: None    Highest education level: None   Tobacco Use    Smoking status: Former     Current packs/day: 0.00     Types: Cigarettes     Quit date: 2000     Years since quittin.4    Smokeless tobacco: Never   Substance and Sexual Activity    Alcohol use: No    Drug use: No    Sexual activity: Not Currently     Social Drivers of Health     Food Insecurity: No Food Insecurity (2025)    Hunger Vital Sign     Worried About Running Out of Food in the Last Year: Never true     Ran Out of Food in the Last Year: Never true   Transportation Needs: No Transportation Needs (2025)    PRAPARE - Transportation     Lack of Transportation (Medical): No     Lack of Transportation (Non-Medical): No    [facility] from [facility], [facility]    Interpersonal Safety   Housing Stability: Low Risk  (2025)    Housing Stability Vital Sign     Unable to Pay for Housing in the Last Year: No     Number of Times Moved in the Last Year: 0     Homeless in the Last Year: No       Medications:   Medications:    sodium chloride flush  5-40 mL IntraVENous 2 times per day    rivaroxaban  10 mg Oral Dinner    citalopram  10 mg Oral Daily    budesonide  0.25 mg Nebulization BID    hydroCHLOROthiazide  25 mg Oral Daily    levothyroxine  75 mcg Oral QAM AC    montelukast  10 mg Oral Nightly    pantoprazole  40 mg Oral QAM AC    rosuvastatin  10 mg Oral Nightly    tiotropium-olodaterol  2 puff Inhalation Daily      Infusions:    sodium chloride       PRN Meds: sodium chloride flush, 5-40 mL, PRN  sodium chloride, , PRN  potassium chloride, 40 mEq, PRN   Or  potassium

## 2025-04-28 NOTE — PLAN OF CARE
Problem: Occupational Therapy - Adult  Goal: By Discharge: Performs self-care activities at highest level of function for planned discharge setting.  See evaluation for individualized goals.  4/28/2025 1048 by Cece Nassar OT  Outcome: Progressing     Problem: Physical Therapy - Adult  Goal: By Discharge: Performs mobility at highest level of function for planned discharge setting.  See evaluation for individualized goals.  4/28/2025 1111 by Martha Tenorio, PT  Outcome: Progressing

## 2025-04-28 NOTE — PLAN OF CARE
Problem: Occupational Therapy - Adult  Goal: By Discharge: Performs self-care activities at highest level of function for planned discharge setting.  See evaluation for individualized goals.  Outcome: Progressing   Cece Nassar, OT

## 2025-04-28 NOTE — PROGRESS NOTES
[facility]    Pharmacy Renal Dose-Adjustment Communication     Rivaroxaban ordered for patient from home medication list.  This medication is renally eliminated.  Will change to 15 mg per renal dose adjustment policy.     Estimated Creatinine Clearance: 45 mL/min (based on SCr of 0.9 mg/dL).     Nonvalvular atrial fibrillation (to prevent stroke and systemic embolism): Oral: 20 mg once daily with the evening meal.   Clcr >50 mL/minute: No dosage adjustment necessary.  Clcr 15-50 mL/minute: 15 mg once daily with the evening meal  Clcr <15 mL/minute: Avoid use.  ESRD requiring hemodialysis: Avoid use.    Pharmacy will continue to monitor renal function and adjust dose as necessary.    Please call with any questions.    Thanks!   Leanna Lucero RPH  4/28/2025 11:30 AM

## 2025-04-28 NOTE — ED NOTES
ED to Inpatient Handoff SBAR    Patient Name: Shahana Kwok   :  1940  84 y.o.   MRN:  [id number]  Preferred Name    ED Room #:    Family/Caregiver Present yes     Chief Complaint Dizziness and Fatigue       Restraints no   Sitter no   Sepsis Risk Score    Isolation No active isolations   Fall Risk Assessment Presents to emergency department  because of falls (Syncope, seizure, or loss of consciousness): Yes, Age > 70: Yes, Altered Mental Status, Intoxication with alcohol or substance confusion (Disorientation, impaired judgment, poor safety awaremess, or inability to follow instructions): No, Impaired Mobility: Ambulates or transfers with assistive devices or assistance; Unable to ambulate or transer.: Yes, Nursing Judgement: Yes     Situation  Code Status: Prior No additional code details.    Allergies: Bee venom  Weight: Patient Vitals for the past 96 hrs (Last 3 readings):   Weight   25 70 kg (154 lb 6.4 oz)     Arrived from: home  Hospital Problem/Diagnosis:  Active Problems:    * No active hospital problems. *  Resolved Problems:    * No resolved hospital problems. *    Imaging:   XR CHEST PORTABLE   Final Result      1. No acute disease.             Electronically signed by Jermaine Lacey MD      CT HEAD WO CONTRAST   Final Result   Impression:       No acute intracranial abnormality.      Electronically signed by Jermaine Lacey MD        Abnormal labs:   Abnormal Labs Reviewed   URINALYSIS WITH REFLEX TO CULTURE - Abnormal; Notable for the following components:       Result Value    Color, UA CARLOS (*)     Specific Gravity, UA >1.030 (*)     Protein, UA TRACE (*)     Urobilinogen, Urine 4.0 (*)     Leukocyte Esterase, Urine TRACE (*)     All other components within normal limits   COMPREHENSIVE METABOLIC PANEL - Abnormal; Notable for the following components:    Glucose 102 (*)     Est, Glom Filt Rate 52 (*)     ALT 9 (*)     All other components within normal limits   PROTIME-INR -

## 2025-04-28 NOTE — PROGRESS NOTES
Patient discharged to home. Discharge instructions and education provided to patient and daughter. IV catheter removed, pressure and dressing applied. Patient transported off unit to lobby and vehicle.

## 2025-04-28 NOTE — PROGRESS NOTES
[facility]    Pharmacy Progress Note    Admit Date: 4/27/2025    List of of current medications patient is taking is complete. Home Medication list in EPIC updated to reflect changes noted below.    Source of information: Dispense report, patient interview     [facility]'s home pharmacy: [facility] ([facility] order, part of [employer] - Phone # [phone]) & [facility] in [place] (Phone # [phone])    Changes made to medication list:   Medications removed: (include reason, ex: therapy completed, patient no longer taking, etc.)  Melatonin   Montelukast  Umeclidinium-Vilanterol (Anoro Ellipta)    Medications added:   Ipatropium bromide 42 mcg (0.06%) nasal spray 2 sprays intranasally four times daily  Medication doses adjusted:   Citalopram changed to 40 mg daily  Hydrochlorothiazide changed to 12.5 mg daily   Levothyroxine changed to 75 mcg daily   Omeprazole changed to 40 mg daily   Rivaroxaban changed to 20 mg tablets 1 tablet daily   Solifenacin changed to 5 mg daily (1 tablet)     Current Outpatient Medications   Medication Instructions    citalopram (CELEXA) 40 mg, Oral, DAILY    fluticasone (ARNUITY ELLIPTA) 100 MCG/ACT AEPB Inhalation, DAILY    hydroCHLOROthiazide 12.5 mg, DAILY    ipratropium (ATROVENT) 0.06 % nasal spray 2 sprays, Nasal, 4 TIMES DAILY    levothyroxine (SYNTHROID) 75 mcg, Oral, DAILY    metoprolol succinate (TOPROL XL) 25 mg, Oral, DAILY    omeprazole (PRILOSEC) 40 mg, Oral, DAILY    potassium chloride (KLOR-CON M) 10 MEQ extended release tablet 10 mEq, Oral, DAILY    rivaroxaban (XARELTO) 20 mg, Oral    rosuvastatin (CRESTOR) 10 mg, Oral, NIGHTLY    solifenacin (VESICARE) 5 mg, Oral, DAILY       Please call with any questions.    Leanna Lucero RPH  4/28/2025 11:06 AM

## 2025-04-28 NOTE — CARE COORDINATION
CASE MANAGEMENT DISCHARGE SUMMARY      Discharge to: home w/ HHC (PT/OT)-[facility]    HUGH given: 4/28/25    Transportation:    Family/car: yes     Confirmed discharge plan with:     Patient: yes     Family:  yes, at bedside     RN, name: Maribell    Note: Discharging nurse to complete KATERINA, reconcile AVS, and place final copy with patient's discharge packet. RN to ensure that written prescriptions for  Level II medications are sent with patient to the facility as per protocol.        
Advance Care Planning     General Advance Care Planning (ACP) Conversation    Date of Conversation: 4/28/2025  Conducted with: Patient with Decision Making Capacity  Other persons present: None    Healthcare Decision Maker:   Primary Decision Maker: YoliBrenda alonzo - Child - [phone]    Primary Decision Maker: Britney Skinner - Child - [phone]    Primary Decision Maker: Gina Miramontes - Child - [phone]     Today we documented Decision Maker(s) consistent with Legal Next of Kin hierarchy.  Content/Action Overview:  Has NO ACP documents-Information provided  Reviewed DNR/DNI and patient elects Full Code (Attempt Resuscitation)        Length of Voluntary ACP Conversation in minutes:  <16 minutes (Non-Billable)    Sammi Steward RN           
none  Potential Assistance needed at discharge: Home Care            Potential DME:    Patient expects to discharge to: House  Plan for transportation at discharge: Family    Financial    Payor: [employer] MEDICARE / Plan: [employer] GOLD PLUS HMO / Product Type: *No Product type* /     Does insurance require precert for SNF: Yes    Potential assistance Purchasing Medications: No  Meds-to-Beds request:        [id number] [place] - [address] [phone] - F [phone]  [address] [address]  Phone: [phone] Fax: [phone]    [id number] [place], [place] - [address] - P [phone] - F [phone]  [address] [facility] [place] [address]  Phone: [phone] Fax: [phone]    [id number] [place], [place] - [address] - P [phone] - F [phone]  [address] [facility] [place] [address]  Phone: [phone] Fax: [phone]      Notes:    Factors facilitating achievement of predicted outcomes: Family support and Has needed Durable Medical Equipment at home    Barriers to discharge: none    Additional Case Management Notes: Spoke with patient at bedside. Patient lives w/ daughter in a 2-level home. Bedroom/Bathroom on 2nd floor. Patient has a cane, RW, showerchair. Daughter assists w/ bathing and meals. Patient agreeable to C, but not staying with daughter in [place] currently, she is staying w/ dtr in [place]-will need address.    The Plan for Transition of Care is related to the following treatment goals of Syncope and collapse [R55]  Heat syncope, sequela [T67.1XXS]    IF APPLICABLE: The Patient and/or patient representative Shahana and her family were provided with a choice of provider and agrees with the discharge plan. Freedom of choice list with basic dialogue that supports the patient's individualized plan of care/goals and shares the quality data associated with the providers was provided to:     Patient Representative Name:       The

## 2025-04-28 NOTE — PROGRESS NOTES
[facility] - Rehabilitation Department       Occupational Therapy  3215/3215-01  [facility] 3 PROGRESSIVE CARE    [x] Initial Evaluation            [x] Daily Treatment Note         [] Discharge Summary      Patient: Shahana Kwok   : 1940   MRN: [id number]   Date of Service:  2025  Discharge Recommendations: Home w/ A/PRN & HHOT     AM-PAC Inpatient Daily Activity Raw Score: 21    DME Required For Discharge: patient has all required DME for discharge    Therapy discharge recommendations take into account each patient's current medical complexities and are subject to input/oversight from the patient's healthcare team.     Barriers to Home Discharge:   [x] Steps to access home entry or bed/bath:   [x] Unable to transfer, ambulate, or propel wheelchair household distances without assist   [] Limited available assist at home upon discharge    [] Patient or family requests d/c to post-acute facility    [] Poor cognition/safety awareness for d/c to home alone    []Unable to maintain ordered weight bearing status    [] Patient with salient signs of long-standing immobility   [x] Patient is at risk for falls due to: decreased activity tolerance   [x] Other: decrease ADL & fx mobility    If pt is unable to be seen after this session, please let this note serve as discharge summary.  Please see case management note for discharge disposition.  Thank you.    Admitting Diagnosis:  Syncope and collapse  Referring Physician: Tom Turpin DO   Current Admission Summary: Tom Turpin DO H&P :  \"Hospital Day: 2     Assessment and Plan:   Shahana Kwok is a 84 y.o. female with a pmh of COPD, hyperlipidemia, hypothyroidism, restless leg syndrome, atrial fibrillation on Xarelto, depression who presents with Syncope and collapse     Hospital Problems             Last Modified POA     * (Principal) Syncope and collapse 2025 Yes      Syncope -based on history it sounds vasovagal.  Patient

## 2025-04-28 NOTE — ED TRIAGE NOTES
Patient arrives with c/o dizziness and weakness. Patient had to be lowered to ground by daughter. Patient denies losing consciousness or injuring self during episode. Patient denies any pain or recent illness. Patient arrives alert and oriented, respirations unlabored.

## 2025-04-29 LAB
EKG DIAGNOSIS: NORMAL
EKG Q-T INTERVAL: 434 MS
EKG QRS DURATION: 102 MS
EKG QTC CALCULATION (BAZETT): 465 MS
EKG R AXIS: -42 DEGREES
EKG T AXIS: 35 DEGREES
EKG VENTRICULAR RATE: 69 BPM

## 2025-04-29 PROCEDURE — 93010 ELECTROCARDIOGRAM REPORT: CPT | Performed by: INTERNAL MEDICINE
